# Patient Record
Sex: FEMALE | Race: BLACK OR AFRICAN AMERICAN | Employment: FULL TIME | URBAN - METROPOLITAN AREA
[De-identification: names, ages, dates, MRNs, and addresses within clinical notes are randomized per-mention and may not be internally consistent; named-entity substitution may affect disease eponyms.]

---

## 2017-07-06 ENCOUNTER — HOSPITAL ENCOUNTER (EMERGENCY)
Age: 22
Discharge: HOME | End: 2017-07-06
Attending: EMERGENCY MEDICINE | Admitting: EMERGENCY MEDICINE
Payer: COMMERCIAL

## 2017-07-06 ENCOUNTER — APPOINTMENT (OUTPATIENT)
Dept: CT IMAGING | Age: 22
End: 2017-07-06
Attending: EMERGENCY MEDICINE
Payer: COMMERCIAL

## 2017-07-06 VITALS
WEIGHT: 218 LBS | OXYGEN SATURATION: 98 % | RESPIRATION RATE: 18 BRPM | HEART RATE: 86 BPM | DIASTOLIC BLOOD PRESSURE: 87 MMHG | SYSTOLIC BLOOD PRESSURE: 119 MMHG | TEMPERATURE: 99.2 F | HEIGHT: 61 IN | BODY MASS INDEX: 41.16 KG/M2

## 2017-07-06 DIAGNOSIS — N39.0 URINARY TRACT INFECTION WITHOUT HEMATURIA, SITE UNSPECIFIED: Primary | ICD-10-CM

## 2017-07-06 LAB
ALBUMIN SERPL BCP-MCNC: 3.7 G/DL (ref 3.5–5)
ALBUMIN/GLOB SERPL: 1 {RATIO} (ref 1.1–2.2)
ALP SERPL-CCNC: 62 U/L (ref 45–117)
ALT SERPL-CCNC: 16 U/L (ref 12–78)
ANION GAP BLD CALC-SCNC: 8 MMOL/L (ref 5–15)
APPEARANCE UR: ABNORMAL
AST SERPL W P-5'-P-CCNC: 10 U/L (ref 15–37)
BACTERIA URNS QL MICRO: ABNORMAL /HPF
BASOPHILS # BLD AUTO: 0 K/UL (ref 0–0.1)
BASOPHILS # BLD: 0 % (ref 0–1)
BILIRUB SERPL-MCNC: 0.6 MG/DL (ref 0.2–1)
BILIRUB UR QL: NEGATIVE
BUN SERPL-MCNC: 8 MG/DL (ref 6–20)
BUN/CREAT SERPL: 10 (ref 12–20)
CALCIUM SERPL-MCNC: 8.6 MG/DL (ref 8.5–10.1)
CHLORIDE SERPL-SCNC: 103 MMOL/L (ref 97–108)
CO2 SERPL-SCNC: 27 MMOL/L (ref 21–32)
COLOR UR: ABNORMAL
CREAT SERPL-MCNC: 0.77 MG/DL (ref 0.55–1.02)
EOSINOPHIL # BLD: 0.1 K/UL (ref 0–0.4)
EOSINOPHIL NFR BLD: 2 % (ref 0–7)
EPITH CASTS URNS QL MICRO: ABNORMAL /LPF
ERYTHROCYTE [DISTWIDTH] IN BLOOD BY AUTOMATED COUNT: 15.2 % (ref 11.5–14.5)
GLOBULIN SER CALC-MCNC: 3.7 G/DL (ref 2–4)
GLUCOSE SERPL-MCNC: 79 MG/DL (ref 65–100)
GLUCOSE UR STRIP.AUTO-MCNC: NEGATIVE MG/DL
HCG UR QL: NEGATIVE
HCT VFR BLD AUTO: 37.6 % (ref 35–47)
HGB BLD-MCNC: 12.4 G/DL (ref 11.5–16)
HGB UR QL STRIP: ABNORMAL
HYALINE CASTS URNS QL MICRO: ABNORMAL /LPF (ref 0–5)
KETONES UR QL STRIP.AUTO: NEGATIVE MG/DL
LEUKOCYTE ESTERASE UR QL STRIP.AUTO: ABNORMAL
LYMPHOCYTES # BLD AUTO: 50 % (ref 12–49)
LYMPHOCYTES # BLD: 2 K/UL (ref 0.8–3.5)
MCH RBC QN AUTO: 26.6 PG (ref 26–34)
MCHC RBC AUTO-ENTMCNC: 33 G/DL (ref 30–36.5)
MCV RBC AUTO: 80.7 FL (ref 80–99)
MONOCYTES # BLD: 0.3 K/UL (ref 0–1)
MONOCYTES NFR BLD AUTO: 8 % (ref 5–13)
NEUTS SEG # BLD: 1.6 K/UL (ref 1.8–8)
NEUTS SEG NFR BLD AUTO: 40 % (ref 32–75)
NITRITE UR QL STRIP.AUTO: NEGATIVE
PH UR STRIP: 6 [PH] (ref 5–8)
PLATELET # BLD AUTO: 396 K/UL (ref 150–400)
POTASSIUM SERPL-SCNC: 3.6 MMOL/L (ref 3.5–5.1)
PROT SERPL-MCNC: 7.4 G/DL (ref 6.4–8.2)
PROT UR STRIP-MCNC: NEGATIVE MG/DL
RBC # BLD AUTO: 4.66 M/UL (ref 3.8–5.2)
RBC #/AREA URNS HPF: ABNORMAL /HPF (ref 0–5)
SODIUM SERPL-SCNC: 138 MMOL/L (ref 136–145)
SP GR UR REFRACTOMETRY: 1.02 (ref 1–1.03)
UA: UC IF INDICATED,UAUC: ABNORMAL
UROBILINOGEN UR QL STRIP.AUTO: 0.2 EU/DL (ref 0.2–1)
WBC # BLD AUTO: 4 K/UL (ref 3.6–11)
WBC URNS QL MICRO: ABNORMAL /HPF (ref 0–4)

## 2017-07-06 PROCEDURE — 80053 COMPREHEN METABOLIC PANEL: CPT | Performed by: EMERGENCY MEDICINE

## 2017-07-06 PROCEDURE — 96375 TX/PRO/DX INJ NEW DRUG ADDON: CPT

## 2017-07-06 PROCEDURE — 74176 CT ABD & PELVIS W/O CONTRAST: CPT

## 2017-07-06 PROCEDURE — 74011250636 HC RX REV CODE- 250/636: Performed by: EMERGENCY MEDICINE

## 2017-07-06 PROCEDURE — 99284 EMERGENCY DEPT VISIT MOD MDM: CPT

## 2017-07-06 PROCEDURE — 87086 URINE CULTURE/COLONY COUNT: CPT | Performed by: EMERGENCY MEDICINE

## 2017-07-06 PROCEDURE — 74011000258 HC RX REV CODE- 258: Performed by: EMERGENCY MEDICINE

## 2017-07-06 PROCEDURE — 36415 COLL VENOUS BLD VENIPUNCTURE: CPT | Performed by: EMERGENCY MEDICINE

## 2017-07-06 PROCEDURE — 85025 COMPLETE CBC W/AUTO DIFF WBC: CPT | Performed by: EMERGENCY MEDICINE

## 2017-07-06 PROCEDURE — 96376 TX/PRO/DX INJ SAME DRUG ADON: CPT

## 2017-07-06 PROCEDURE — 96365 THER/PROPH/DIAG IV INF INIT: CPT

## 2017-07-06 PROCEDURE — 81025 URINE PREGNANCY TEST: CPT

## 2017-07-06 PROCEDURE — 81001 URINALYSIS AUTO W/SCOPE: CPT | Performed by: EMERGENCY MEDICINE

## 2017-07-06 RX ORDER — FLUOXETINE HYDROCHLORIDE 20 MG/1
CAPSULE ORAL DAILY
COMMUNITY
End: 2021-06-13

## 2017-07-06 RX ORDER — MORPHINE SULFATE 4 MG/ML
4 INJECTION, SOLUTION INTRAMUSCULAR; INTRAVENOUS ONCE
Status: COMPLETED | OUTPATIENT
Start: 2017-07-06 | End: 2017-07-06

## 2017-07-06 RX ORDER — CLONAZEPAM 1 MG/1
1 TABLET ORAL 2 TIMES DAILY
COMMUNITY
End: 2021-06-13

## 2017-07-06 RX ORDER — MORPHINE SULFATE 4 MG/ML
2 INJECTION, SOLUTION INTRAMUSCULAR; INTRAVENOUS ONCE
Status: COMPLETED | OUTPATIENT
Start: 2017-07-06 | End: 2017-07-06

## 2017-07-06 RX ORDER — ALPRAZOLAM 0.5 MG/1
0.5 TABLET ORAL
COMMUNITY
End: 2021-06-13

## 2017-07-06 RX ORDER — CEPHALEXIN 500 MG/1
500 CAPSULE ORAL 4 TIMES DAILY
Qty: 28 CAP | Refills: 0 | Status: SHIPPED | OUTPATIENT
Start: 2017-07-06 | End: 2017-07-13

## 2017-07-06 RX ADMIN — Medication 2 MG: at 17:31

## 2017-07-06 RX ADMIN — Medication 4 MG: at 15:50

## 2017-07-06 RX ADMIN — CEFTRIAXONE SODIUM 1 G: 1 INJECTION, POWDER, FOR SOLUTION INTRAMUSCULAR; INTRAVENOUS at 17:09

## 2017-07-06 NOTE — ED NOTES
Patient discharged by provider. Patient has no new complaints at this time. Patient leaves ambulatory with steady gait with  who is driving her home.

## 2017-07-06 NOTE — DISCHARGE INSTRUCTIONS
Urinary Tract Infection in Women: Care Instructions  Your Care Instructions    A urinary tract infection, or UTI, is a general term for an infection anywhere between the kidneys and the urethra (where urine comes out). Most UTIs are bladder infections. They often cause pain or burning when you urinate. UTIs are caused by bacteria and can be cured with antibiotics. Be sure to complete your treatment so that the infection goes away. Follow-up care is a key part of your treatment and safety. Be sure to make and go to all appointments, and call your doctor if you are having problems. It's also a good idea to know your test results and keep a list of the medicines you take. How can you care for yourself at home? · Take your antibiotics as directed. Do not stop taking them just because you feel better. You need to take the full course of antibiotics. · Drink extra water and other fluids for the next day or two. This may help wash out the bacteria that are causing the infection. (If you have kidney, heart, or liver disease and have to limit fluids, talk with your doctor before you increase your fluid intake.)  · Avoid drinks that are carbonated or have caffeine. They can irritate the bladder. · Urinate often. Try to empty your bladder each time. · To relieve pain, take a hot bath or lay a heating pad set on low over your lower belly or genital area. Never go to sleep with a heating pad in place. To prevent UTIs  · Drink plenty of water each day. This helps you urinate often, which clears bacteria from your system. (If you have kidney, heart, or liver disease and have to limit fluids, talk with your doctor before you increase your fluid intake.)  · Urinate when you need to. · Urinate right after you have sex. · Change sanitary pads often. · Avoid douches, bubble baths, feminine hygiene sprays, and other feminine hygiene products that have deodorants.   · After going to the bathroom, wipe from front to back.  When should you call for help? Call your doctor now or seek immediate medical care if:  · Symptoms such as fever, chills, nausea, or vomiting get worse or appear for the first time. · You have new pain in your back just below your rib cage. This is called flank pain. · There is new blood or pus in your urine. · You have any problems with your antibiotic medicine. Watch closely for changes in your health, and be sure to contact your doctor if:  · You are not getting better after taking an antibiotic for 2 days. · Your symptoms go away but then come back. Where can you learn more? Go to http://victoria-cristiano.info/. Enter X316 in the search box to learn more about \"Urinary Tract Infection in Women: Care Instructions. \"  Current as of: November 28, 2016  Content Version: 11.3  © 6499-0170 [employer]. Care instructions adapted under license by [employer] (which disclaims liability or warranty for this information). If you have questions about a medical condition or this instruction, always ask your healthcare professional. [address] any warranty or liability for your use of this information. We hope that we have addressed all of your medical concerns. The examination and treatment you received in the Emergency Department were for an emergent problem and were not intended as complete care. It is important that you follow up with your healthcare provider(s) for ongoing care. If your symptoms worsen or do not improve as expected, and you are unable to reach your usual health care provider(s), you should return to the Emergency Department. Today's healthcare is undergoing tremendous change, and patient satisfaction surveys are one of the many tools to assess the quality of medical care. You may receive a survey from the [employer] organization regarding your experience in the Emergency Department.   I hope that your experience has been completely positive, particularly the medical care that I provided. As such, please participate in the survey; anything less than excellent does not meet my expectations or intentions. [address] [facility] and [address] [place] [address] participate in nationally recognized quality of care measures. If your blood pressure is greater than 120/80, as reported below, we urge that you seek medical care to address the potential of high blood pressure, commonly known as hypertension. Hypertension can be hereditary or can be caused by certain medical conditions, pain, stress, or \"white coat syndrome. \"       Please make an appointment with your health care provider(s) for follow up of your Emergency Department visit. VITALS:   Patient Vitals for the past 8 hrs:   Temp Pulse Resp BP SpO2   07/06/17 1630 - - - - 98 %   07/06/17 1503 99.2 °F (37.3 °C) 86 18 126/88 98 %          Thank you for allowing us to provide you with medical care today. We realize that you have many choices for your emergency care needs. Please choose us in the future for any continued health care needs. Jessica Dow MD    [facility].   Office: [phone]            Recent Results (from the past 24 hour(s))   URINALYSIS W/ REFLEX CULTURE    Collection Time: 07/06/17  3:47 PM   Result Value Ref Range    Color YELLOW/STRAW      Appearance CLOUDY (A) CLEAR      Specific gravity 1.022 1.003 - 1.030      pH (UA) 6.0 5.0 - 8.0      Protein NEGATIVE  NEG mg/dL    Glucose NEGATIVE  NEG mg/dL    Ketone NEGATIVE  NEG mg/dL    Bilirubin NEGATIVE  NEG      Blood TRACE (A) NEG      Urobilinogen 0.2 0.2 - 1.0 EU/dL    Nitrites NEGATIVE  NEG      Leukocyte Esterase TRACE (A) NEG      WBC 10-20 0 - 4 /hpf    RBC 5-10 0 - 5 /hpf    Epithelial cells MODERATE (A) FEW /lpf    Bacteria 1+ (A) NEG /hpf    UA:UC IF INDICATED URINE CULTURE ORDERED (A) CNI      Hyaline cast 0-2 0 - 5 /lpf   CBC WITH AUTOMATED DIFF    Collection Time: 07/06/17  3:47 PM   Result Value Ref Range    WBC 4.0 3.6 - 11.0 K/uL    RBC 4.66 3.80 - 5.20 M/uL    HGB 12.4 11.5 - 16.0 g/dL    HCT 37.6 35.0 - 47.0 %    MCV 80.7 80.0 - 99.0 FL    MCH 26.6 26.0 - 34.0 PG    MCHC 33.0 30.0 - 36.5 g/dL    RDW 15.2 (H) 11.5 - 14.5 %    PLATELET 550 564 - 479 K/uL    NEUTROPHILS 40 32 - 75 %    LYMPHOCYTES 50 (H) 12 - 49 %    MONOCYTES 8 5 - 13 %    EOSINOPHILS 2 0 - 7 %    BASOPHILS 0 0 - 1 %    ABS. NEUTROPHILS 1.6 (L) 1.8 - 8.0 K/UL    ABS. LYMPHOCYTES 2.0 0.8 - 3.5 K/UL    ABS. MONOCYTES 0.3 0.0 - 1.0 K/UL    ABS. EOSINOPHILS 0.1 0.0 - 0.4 K/UL    ABS. BASOPHILS 0.0 0.0 - 0.1 K/UL   HCG URINE, QL. - POC    Collection Time: 07/06/17  3:56 PM   Result Value Ref Range    Pregnancy test,urine (POC) NEGATIVE  NEG     METABOLIC PANEL, COMPREHENSIVE    Collection Time: 07/06/17  4:28 PM   Result Value Ref Range    Sodium 138 136 - 145 mmol/L    Potassium 3.6 3.5 - 5.1 mmol/L    Chloride 103 97 - 108 mmol/L    CO2 27 21 - 32 mmol/L    Anion gap 8 5 - 15 mmol/L    Glucose 79 65 - 100 mg/dL    BUN 8 6 - 20 MG/DL    Creatinine 0.77 0.55 - 1.02 MG/DL    BUN/Creatinine ratio 10 (L) 12 - 20      GFR est AA >60 >60 ml/min/1.73m2    GFR est non-AA >60 >60 ml/min/1.73m2    Calcium 8.6 8.5 - 10.1 MG/DL    Bilirubin, total 0.6 0.2 - 1.0 MG/DL    ALT (SGPT) 16 12 - 78 U/L    AST (SGOT) 10 (L) 15 - 37 U/L    Alk. phosphatase 62 45 - 117 U/L    Protein, total 7.4 6.4 - 8.2 g/dL    Albumin 3.7 3.5 - 5.0 g/dL    Globulin 3.7 2.0 - 4.0 g/dL    A-G Ratio 1.0 (L) 1.1 - 2.2         Ct Abd Pelv Wo Cont    Result Date: 7/6/2017  INDICATION: kid stone COMPARISON: February 6, 2015 TECHNIQUE: Noncontrast thin axial images were obtained through the abdomen and pelvis. Coronal and sagittal reconstructions were generated.  CT dose reduction was achieved through use of a standardized protocol tailored for this examination and automatic exposure control for dose modulation. FINDINGS: LUNG BASES: No abnormality. LIVER: No mass or biliary dilatation. GALLBLADDER: Unremarkable. SPLEEN: No enlargement or lesion. PANCREAS: No mass or ductal dilatation. ADRENALS: No mass. KIDNEYS: Several punctate calculi in both kidneys. No hydronephrosis. No ureteral calculus evident. GI TRACT:  No bowel obstruction. Difficult to assess bowel wall thickening given lack of oral contrast material. PERITONEUM: No free air or free fluid. APPENDIX: Unremarkable. Frutoso Golder RETROPERITONEUM: No lymphadenopathy or aortic aneurysm. ADDITIONAL COMMENTS: N/A. URINARY BLADDER: Unremarkable. REPRODUCTIVE ORGANS: Unremarkable. LYMPH NODES:  None enlarged. FREE FLUID:  None. BONES: No destructive bone lesion. ADDITIONAL COMMENTS: N/A. IMPRESSION: Punctate bilateral nephrolithiasis, with no ureteral calculus/hydronephrosis. No acute abnormality. Urinary Tract Infection in Women: Care Instructions  Your Care Instructions    A urinary tract infection, or UTI, is a general term for an infection anywhere between the kidneys and the urethra (where urine comes out). Most UTIs are bladder infections. They often cause pain or burning when you urinate. UTIs are caused by bacteria and can be cured with antibiotics. Be sure to complete your treatment so that the infection goes away. Follow-up care is a key part of your treatment and safety. Be sure to make and go to all appointments, and call your doctor if you are having problems. It's also a good idea to know your test results and keep a list of the medicines you take. How can you care for yourself at home? · Take your antibiotics as directed. Do not stop taking them just because you feel better. You need to take the full course of antibiotics. · Drink extra water and other fluids for the next day or two. This may help wash out the bacteria that are causing the infection.  (If you have kidney, heart, or liver disease and have to limit fluids, talk with your doctor before you increase your fluid intake.)  · Avoid drinks that are carbonated or have caffeine. They can irritate the bladder. · Urinate often. Try to empty your bladder each time. · To relieve pain, take a hot bath or lay a heating pad set on low over your lower belly or genital area. Never go to sleep with a heating pad in place. To prevent UTIs  · Drink plenty of water each day. This helps you urinate often, which clears bacteria from your system. (If you have kidney, heart, or liver disease and have to limit fluids, talk with your doctor before you increase your fluid intake.)  · Urinate when you need to. · Urinate right after you have sex. · Change sanitary pads often. · Avoid douches, bubble baths, feminine hygiene sprays, and other feminine hygiene products that have deodorants. · After going to the bathroom, wipe from front to back. When should you call for help? Call your doctor now or seek immediate medical care if:  · Symptoms such as fever, chills, nausea, or vomiting get worse or appear for the first time. · You have new pain in your back just below your rib cage. This is called flank pain. · There is new blood or pus in your urine. · You have any problems with your antibiotic medicine. Watch closely for changes in your health, and be sure to contact your doctor if:  · You are not getting better after taking an antibiotic for 2 days. · Your symptoms go away but then come back. Where can you learn more? Go to http://victoria-cristiano.info/. Enter Y290 in the search box to learn more about \"Urinary Tract Infection in Women: Care Instructions. \"  Current as of: November 28, 2016  Content Version: 11.3  © 5514-2705 [employer]. Care instructions adapted under license by [employer] (which disclaims liability or warranty for this information).  If you have questions about a medical condition or this instruction, always ask your healthcare professional. [address] any warranty or liability for your use of this information. We hope that we have addressed all of your medical concerns. The examination and treatment you received in the Emergency Department were for an emergent problem and were not intended as complete care. It is important that you follow up with your healthcare provider(s) for ongoing care. If your symptoms worsen or do not improve as expected, and you are unable to reach your usual health care provider(s), you should return to the Emergency Department. Today's healthcare is undergoing tremendous change, and patient satisfaction surveys are one of the many tools to assess the quality of medical care. You may receive a survey from the [employer] regarding your experience in the Emergency Department. I hope that your experience has been completely positive, particularly the medical care that I provided. As such, please participate in the survey; anything less than excellent does not meet my expectations or intentions. [facility] and [address] [place] [address] participate in nationally recognized quality of care measures. If your blood pressure is greater than 120/80, as reported below, we urge that you seek medical care to address the potential of high blood pressure, commonly known as hypertension. Hypertension can be hereditary or can be caused by certain medical conditions, pain, stress, or \"white coat syndrome. \"       Please make an appointment with your health care provider(s) for follow up of your Emergency Department visit. VITALS:   Patient Vitals for the past 8 hrs:   Temp Pulse Resp BP SpO2   07/06/17 1630 - - - - 98 %   07/06/17 1503 99.2 °F (37.3 °C) 86 18 126/88 98 %          Thank you for allowing us to provide you with medical care today. We realize that you have many choices for your emergency care needs.   Please choose us in the future for any continued health care needs. Glendell Coffin [employer], [facility]   Office: [phone]            Recent Results (from the past 24 hour(s))   URINALYSIS W/ REFLEX CULTURE    Collection Time: 07/06/17  3:47 PM   Result Value Ref Range    Color YELLOW/STRAW      Appearance CLOUDY (A) CLEAR      Specific gravity 1.022 1.003 - 1.030      pH (UA) 6.0 5.0 - 8.0      Protein NEGATIVE  NEG mg/dL    Glucose NEGATIVE  NEG mg/dL    Ketone NEGATIVE  NEG mg/dL    Bilirubin NEGATIVE  NEG      Blood TRACE (A) NEG      Urobilinogen 0.2 0.2 - 1.0 EU/dL    Nitrites NEGATIVE  NEG      Leukocyte Esterase TRACE (A) NEG      WBC 10-20 0 - 4 /hpf    RBC 5-10 0 - 5 /hpf    Epithelial cells MODERATE (A) FEW /lpf    Bacteria 1+ (A) NEG /hpf    UA:UC IF INDICATED URINE CULTURE ORDERED (A) CNI      Hyaline cast 0-2 0 - 5 /lpf   CBC WITH AUTOMATED DIFF    Collection Time: 07/06/17  3:47 PM   Result Value Ref Range    WBC 4.0 3.6 - 11.0 K/uL    RBC 4.66 3.80 - 5.20 M/uL    HGB 12.4 11.5 - 16.0 g/dL    HCT 37.6 35.0 - 47.0 %    MCV 80.7 80.0 - 99.0 FL    MCH 26.6 26.0 - 34.0 PG    MCHC 33.0 30.0 - 36.5 g/dL    RDW 15.2 (H) 11.5 - 14.5 %    PLATELET 049 462 - 346 K/uL    NEUTROPHILS 40 32 - 75 %    LYMPHOCYTES 50 (H) 12 - 49 %    MONOCYTES 8 5 - 13 %    EOSINOPHILS 2 0 - 7 %    BASOPHILS 0 0 - 1 %    ABS. NEUTROPHILS 1.6 (L) 1.8 - 8.0 K/UL    ABS. LYMPHOCYTES 2.0 0.8 - 3.5 K/UL    ABS. MONOCYTES 0.3 0.0 - 1.0 K/UL    ABS. EOSINOPHILS 0.1 0.0 - 0.4 K/UL    ABS.  BASOPHILS 0.0 0.0 - 0.1 K/UL   HCG URINE, QL. - POC    Collection Time: 07/06/17  3:56 PM   Result Value Ref Range    Pregnancy test,urine (POC) NEGATIVE  NEG     METABOLIC PANEL, COMPREHENSIVE    Collection Time: 07/06/17  4:28 PM   Result Value Ref Range    Sodium 138 136 - 145 mmol/L    Potassium 3.6 3.5 - 5.1 mmol/L    Chloride 103 97 - 108 mmol/L    CO2 27 21 - 32 mmol/L    Anion gap 8 5 - 15 mmol/L    Glucose 79 65 - 100 mg/dL    BUN 8 6 - 20 MG/DL    Creatinine 0.77 0.55 - 1.02 MG/DL    BUN/Creatinine ratio 10 (L) 12 - 20      GFR est AA >60 >60 ml/min/1.73m2    GFR est non-AA >60 >60 ml/min/1.73m2    Calcium 8.6 8.5 - 10.1 MG/DL    Bilirubin, total 0.6 0.2 - 1.0 MG/DL    ALT (SGPT) 16 12 - 78 U/L    AST (SGOT) 10 (L) 15 - 37 U/L    Alk. phosphatase 62 45 - 117 U/L    Protein, total 7.4 6.4 - 8.2 g/dL    Albumin 3.7 3.5 - 5.0 g/dL    Globulin 3.7 2.0 - 4.0 g/dL    A-G Ratio 1.0 (L) 1.1 - 2.2         Ct Abd Pelv Wo Cont    Result Date: 7/6/2017  INDICATION: kid stone COMPARISON: February 6, 2015 TECHNIQUE: Noncontrast thin axial images were obtained through the abdomen and pelvis. Coronal and sagittal reconstructions were generated. CT dose reduction was achieved through use of a standardized protocol tailored for this examination and automatic exposure control for dose modulation. FINDINGS: LUNG BASES: No abnormality. LIVER: No mass or biliary dilatation. GALLBLADDER: Unremarkable. SPLEEN: No enlargement or lesion. PANCREAS: No mass or ductal dilatation. ADRENALS: No mass. KIDNEYS: Several punctate calculi in both kidneys. No hydronephrosis. No ureteral calculus evident. GI TRACT:  No bowel obstruction. Difficult to assess bowel wall thickening given lack of oral contrast material. PERITONEUM: No free air or free fluid. APPENDIX: Unremarkable. Danelle Belch RETROPERITONEUM: No lymphadenopathy or aortic aneurysm. ADDITIONAL COMMENTS: N/A. URINARY BLADDER: Unremarkable. REPRODUCTIVE ORGANS: Unremarkable. LYMPH NODES:  None enlarged. FREE FLUID:  None. BONES: No destructive bone lesion. ADDITIONAL COMMENTS: N/A. IMPRESSION: Punctate bilateral nephrolithiasis, with no ureteral calculus/hydronephrosis. No acute abnormality.

## 2017-07-06 NOTE — ED PROVIDER NOTES
HPI Comments: 19 y.o. female with past medical history significant for s/p lithotripsy x 5, nephrolithiasis, CKD and kidney stones who presents from home with chief complaint of flank pain. Pt states that since 2230 last night she has been experiencing cramping left sided flank pain with associated intermittent sharp pain. Pt also claims to be experiencing nausea, decreased appetite and a subjective low grade fever as well. Pt says that her sxs feel similar to prior kidney stones she has had in the past. Pt denies having had any recent lithotripsy procedures but claims to have had about 5 lithotripsy procedures in the past. There are no other acute medical concerns at this time. Social hx: (+) tobacco use. [address] [facility]. Note written by Rica Johnson. Otilia Pickard, as dictated by Roseanne Herrera MD 3:25 PM      The history is provided by the patient. No  was used. Past Medical History:   Diagnosis Date    ADHD (attention deficit hyperactivity disorder) 2008    Dr. Germania Kelsey Ankle fracture, left     4x    Asthma attack     Chronic kidney disease     kidney stones    Depression, major 8/2013    Dr. Petty Mood allergy 2010    beef (severe), tomoato (severe), others. Dr. Terrazas Began GERD (gastroesophageal reflux disease)     IUD (intrauterine device) in place 7/2014    Dr. Veronica Walls    Nephrolithiasis     last 8/2012 s/p lithotripsy. calcium    Panic anxiety syndrome 8/2013    Dr. Germania Kelsey Pap smear for cervical cancer screening     Dr Veronica Walls       Past Surgical History:   Procedure Laterality Date    HX TONSIL AND ADENOIDECTOMY      LITHOTRIPSY  3 total         No family history on file. Social History     Social History    Marital status: SINGLE     Spouse name: N/A    Number of children: N/A    Years of education: N/A     Occupational History    Not on file.      Social History Main Topics    Smoking status: Current Every Day Smoker     Types: Cigarettes    Smokeless tobacco: Never Used    Alcohol use No    Drug use: No    Sexual activity: Yes     Other Topics Concern    Not on file     Social History Narrative         ALLERGIES: Hydrocodone; Bactrim [sulfamethoprim ds]; and Griseofulvin    Review of Systems   Constitutional: Positive for appetite change. HENT: Negative. Respiratory: Negative. Cardiovascular: Negative. Gastrointestinal: Positive for nausea. Genitourinary: Positive for flank pain. All other systems reviewed and are negative. Vitals:    07/06/17 1503   BP: 126/88   Pulse: 86   Resp: 18   Temp: 99.2 °F (37.3 °C)   SpO2: 98%   Weight: 98.9 kg (218 lb)   Height: 5' 1\" (1.549 m)            Physical Exam   Constitutional: She is oriented to person, place, and time. She appears well-developed and well-nourished. No distress. HENT:   Head: Normocephalic and atraumatic. Eyes: Conjunctivae are normal. No scleral icterus. Neck: Neck supple. No tracheal deviation present. Cardiovascular: Normal rate, regular rhythm, normal heart sounds and intact distal pulses. Exam reveals no gallop and no friction rub. No murmur heard. Pulmonary/Chest: Effort normal and breath sounds normal. She has no wheezes. She has no rales. Abdominal: Soft. She exhibits no distension. There is no tenderness. There is CVA tenderness. There is no rebound and no guarding. Musculoskeletal: She exhibits no edema. Neurological: She is alert and oriented to person, place, and time. Skin: Skin is warm and dry. No rash noted. Psychiatric: She has a normal mood and affect. Nursing note and vitals reviewed. Note written by Julianna Curtis. Teofilo Hughes, as dictated by Indy Gomes MD 3:25 PM       [facility]  ED Course       Procedures    PROGRESS NOTE:  5:58 PM  Pt's CT shows only punctate calculi in the kidneys, no uretal calculi or blockages. Will treat for UTI and discharge home with close f/u.    5:59 PM  Patient's results have been reviewed with them. Patient and/or family have verbally conveyed their understanding and agreement of the patient's signs, symptoms, diagnosis, treatment and prognosis and additionally agree to follow up as recommended or return to the Emergency Room should their condition change prior to follow-up. Discharge instructions have also been provided to the patient with some educational information regarding their diagnosis as well a list of reasons why they would want to return to the ER prior to their follow-up appointment should their condition change.

## 2017-07-06 NOTE — ED TRIAGE NOTES
Pt c/o sharp left flank pain that started last night. +nausea. H/o kidney stones and states this feels the same.

## 2017-07-08 LAB
BACTERIA SPEC CULT: NORMAL
CC UR VC: NORMAL
SERVICE CMNT-IMP: NORMAL

## 2017-07-27 ENCOUNTER — APPOINTMENT (OUTPATIENT)
Dept: ULTRASOUND IMAGING | Age: 22
End: 2017-07-27
Attending: NURSE PRACTITIONER
Payer: COMMERCIAL

## 2017-07-27 ENCOUNTER — HOSPITAL ENCOUNTER (EMERGENCY)
Age: 22
Discharge: HOME | End: 2017-07-27
Attending: EMERGENCY MEDICINE
Payer: COMMERCIAL

## 2017-07-27 VITALS
HEIGHT: 60 IN | DIASTOLIC BLOOD PRESSURE: 90 MMHG | SYSTOLIC BLOOD PRESSURE: 124 MMHG | HEART RATE: 81 BPM | TEMPERATURE: 98.4 F | BODY MASS INDEX: 42.21 KG/M2 | RESPIRATION RATE: 16 BRPM | OXYGEN SATURATION: 100 % | WEIGHT: 215 LBS

## 2017-07-27 DIAGNOSIS — N23 RENAL COLIC: Primary | ICD-10-CM

## 2017-07-27 LAB
ANION GAP BLD CALC-SCNC: 7 MMOL/L (ref 5–15)
APPEARANCE UR: ABNORMAL
BACTERIA URNS QL MICRO: ABNORMAL /HPF
BASOPHILS # BLD AUTO: 0 K/UL (ref 0–0.1)
BASOPHILS # BLD: 0 % (ref 0–1)
BILIRUB UR QL: NEGATIVE
BUN SERPL-MCNC: 8 MG/DL (ref 6–20)
BUN/CREAT SERPL: 8 (ref 12–20)
CALCIUM SERPL-MCNC: 8.6 MG/DL (ref 8.5–10.1)
CHLORIDE SERPL-SCNC: 105 MMOL/L (ref 97–108)
CO2 SERPL-SCNC: 29 MMOL/L (ref 21–32)
COLOR UR: ABNORMAL
CREAT SERPL-MCNC: 0.97 MG/DL (ref 0.55–1.02)
EOSINOPHIL # BLD: 0.1 K/UL (ref 0–0.4)
EOSINOPHIL NFR BLD: 2 % (ref 0–7)
EPITH CASTS URNS QL MICRO: ABNORMAL /LPF
ERYTHROCYTE [DISTWIDTH] IN BLOOD BY AUTOMATED COUNT: 16.3 % (ref 11.5–14.5)
GLUCOSE SERPL-MCNC: 75 MG/DL (ref 65–100)
GLUCOSE UR STRIP.AUTO-MCNC: NEGATIVE MG/DL
HCG UR QL: NEGATIVE
HCT VFR BLD AUTO: 39.7 % (ref 35–47)
HGB BLD-MCNC: 12.6 G/DL (ref 11.5–16)
HGB UR QL STRIP: ABNORMAL
KETONES UR QL STRIP.AUTO: ABNORMAL MG/DL
LEUKOCYTE ESTERASE UR QL STRIP.AUTO: ABNORMAL
LYMPHOCYTES # BLD AUTO: 39 % (ref 12–49)
LYMPHOCYTES # BLD: 2.5 K/UL (ref 0.8–3.5)
MAGNESIUM SERPL-MCNC: 2 MG/DL (ref 1.6–2.4)
MCH RBC QN AUTO: 26.8 PG (ref 26–34)
MCHC RBC AUTO-ENTMCNC: 31.7 G/DL (ref 30–36.5)
MCV RBC AUTO: 84.5 FL (ref 80–99)
MONOCYTES # BLD: 0.6 K/UL (ref 0–1)
MONOCYTES NFR BLD AUTO: 9 % (ref 5–13)
MUCOUS THREADS URNS QL MICRO: ABNORMAL /LPF
NEUTS SEG # BLD: 3.3 K/UL (ref 1.8–8)
NEUTS SEG NFR BLD AUTO: 50 % (ref 32–75)
NITRITE UR QL STRIP.AUTO: NEGATIVE
PH UR STRIP: 6 [PH] (ref 5–8)
PLATELET # BLD AUTO: 390 K/UL (ref 150–400)
POTASSIUM SERPL-SCNC: 4.1 MMOL/L (ref 3.5–5.1)
PROT UR STRIP-MCNC: 30 MG/DL
RBC # BLD AUTO: 4.7 M/UL (ref 3.8–5.2)
RBC #/AREA URNS HPF: >100 /HPF (ref 0–5)
SODIUM SERPL-SCNC: 141 MMOL/L (ref 136–145)
SP GR UR REFRACTOMETRY: 1.02 (ref 1–1.03)
UROBILINOGEN UR QL STRIP.AUTO: 0.2 EU/DL (ref 0.2–1)
WBC # BLD AUTO: 6.5 K/UL (ref 3.6–11)
WBC URNS QL MICRO: ABNORMAL /HPF (ref 0–4)

## 2017-07-27 PROCEDURE — 74011250636 HC RX REV CODE- 250/636: Performed by: EMERGENCY MEDICINE

## 2017-07-27 PROCEDURE — 99283 EMERGENCY DEPT VISIT LOW MDM: CPT

## 2017-07-27 PROCEDURE — 96361 HYDRATE IV INFUSION ADD-ON: CPT

## 2017-07-27 PROCEDURE — 87086 URINE CULTURE/COLONY COUNT: CPT | Performed by: EMERGENCY MEDICINE

## 2017-07-27 PROCEDURE — 85025 COMPLETE CBC W/AUTO DIFF WBC: CPT | Performed by: NURSE PRACTITIONER

## 2017-07-27 PROCEDURE — 81001 URINALYSIS AUTO W/SCOPE: CPT | Performed by: NURSE PRACTITIONER

## 2017-07-27 PROCEDURE — 83735 ASSAY OF MAGNESIUM: CPT | Performed by: NURSE PRACTITIONER

## 2017-07-27 PROCEDURE — 76770 US EXAM ABDO BACK WALL COMP: CPT

## 2017-07-27 PROCEDURE — 96374 THER/PROPH/DIAG INJ IV PUSH: CPT

## 2017-07-27 PROCEDURE — 96375 TX/PRO/DX INJ NEW DRUG ADDON: CPT

## 2017-07-27 PROCEDURE — 80048 BASIC METABOLIC PNL TOTAL CA: CPT | Performed by: NURSE PRACTITIONER

## 2017-07-27 PROCEDURE — 81025 URINE PREGNANCY TEST: CPT

## 2017-07-27 PROCEDURE — 36415 COLL VENOUS BLD VENIPUNCTURE: CPT | Performed by: NURSE PRACTITIONER

## 2017-07-27 PROCEDURE — 74011250636 HC RX REV CODE- 250/636: Performed by: NURSE PRACTITIONER

## 2017-07-27 RX ORDER — MORPHINE SULFATE 2 MG/ML
4 INJECTION, SOLUTION INTRAMUSCULAR; INTRAVENOUS ONCE
Status: COMPLETED | OUTPATIENT
Start: 2017-07-27 | End: 2017-07-27

## 2017-07-27 RX ORDER — OXYCODONE AND ACETAMINOPHEN 5; 325 MG/1; MG/1
1 TABLET ORAL
Qty: 15 TAB | Refills: 0 | OUTPATIENT
Start: 2017-07-27 | End: 2020-08-16

## 2017-07-27 RX ORDER — ONDANSETRON 4 MG/1
4 TABLET, ORALLY DISINTEGRATING ORAL
Qty: 10 TAB | Refills: 0 | Status: SHIPPED | OUTPATIENT
Start: 2017-07-27 | End: 2018-08-12

## 2017-07-27 RX ORDER — BUTALBITAL, ACETAMINOPHEN AND CAFFEINE 50; 325; 40 MG/1; MG/1; MG/1
1 TABLET ORAL
Status: DISCONTINUED | OUTPATIENT
Start: 2017-07-27 | End: 2017-07-28 | Stop reason: HOSPADM

## 2017-07-27 RX ORDER — KETOROLAC TROMETHAMINE 30 MG/ML
15 INJECTION, SOLUTION INTRAMUSCULAR; INTRAVENOUS
Status: COMPLETED | OUTPATIENT
Start: 2017-07-27 | End: 2017-07-27

## 2017-07-27 RX ORDER — ONDANSETRON 2 MG/ML
4 INJECTION INTRAMUSCULAR; INTRAVENOUS
Status: COMPLETED | OUTPATIENT
Start: 2017-07-27 | End: 2017-07-27

## 2017-07-27 RX ADMIN — KETOROLAC TROMETHAMINE 15 MG: 30 INJECTION, SOLUTION INTRAMUSCULAR at 20:17

## 2017-07-27 RX ADMIN — Medication 4 MG: at 18:32

## 2017-07-27 RX ADMIN — SODIUM CHLORIDE 1000 ML: 900 INJECTION, SOLUTION INTRAVENOUS at 18:32

## 2017-07-27 RX ADMIN — ONDANSETRON 4 MG: 2 INJECTION INTRAMUSCULAR; INTRAVENOUS at 19:25

## 2017-07-27 NOTE — ED PROVIDER NOTES
HPI Comments: 19 y.o. female with past medical history significant for kidney stones, lithotripsy, GERD, CKD, asthma, and depression  who presents from home with chief complaint of left flank pain. The pt c/o left flank pain with nausea, difficulty urinating, urgency, and a feeling of urinary retention that started 2 days ago. The pt reports that she was able to urinate today but it was painful and reduced output. The pt has had multiple kidney stones and lithotripsy attributed to elevated natural calcium levels. The pt denies vomiting, fever and chills. States pain is 10/10 in the left flank with associated nausea. There are no other acute medical concerns at this time. Social hx: current smoker, no EtOH use  PCP: None  Past Medical History:  2008: ADHD (attention deficit hyperactivity disorder)      Comment: Dr. Tammie Patton  No date: Ankle fracture, left      Comment: 4x  No date: Asthma attack  No date: Chronic kidney disease      Comment: kidney stones  8/2013: Depression, major      Comment: Dr. Tammie Patton  2010: Food allergy      Comment: beef (severe), tomoato (severe), others. Dr. Macie Bravo  No date: GERD (gastroesophageal reflux disease)  7/2014: IUD (intrauterine device) in place      Comment: Dr. John Paul Pagan  No date: Nephrolithiasis      Comment: last 8/2012 s/p lithotripsy. calcium  8/2013: Panic anxiety syndrome      Comment: Dr. Tammie Patton  No date: Pap smear for cervical cancer screening      Comment: Dr John Paul Pagan  Past Surgical History:  No date: HX TONSIL AND ADENOIDECTOMY  3 total: LITHOTRIPSY    Note written by Giovanni Plascencia, as dictated by Tristen Taylor NP  6:12 PM        The history is provided by the patient and the spouse. No  was used.         Past Medical History:   Diagnosis Date    ADHD (attention deficit hyperactivity disorder) 2008    Dr. Law Rivera Ankle fracture, left     4x    Asthma attack     Chronic kidney disease     kidney stones    Depression, major 8/2013    Dr. [place] allergy 2010    beef (severe), tomoato (severe), others. Dr. Kamilla Melo GERD (gastroesophageal reflux disease)     IUD (intrauterine device) in place 7/2014    Dr. Lupe Mayer    Nephrolithiasis     last 8/2012 s/p lithotripsy. calcium    Panic anxiety syndrome 8/2013    Dr. Chyna Reyes Pap smear for cervical cancer screening     Dr Lupe Mayer       Past Surgical History:   Procedure Laterality Date    HX TONSIL AND ADENOIDECTOMY      LITHOTRIPSY  3 total         No family history on file. Social History     Social History    Marital status: SINGLE     Spouse name: N/A    Number of children: N/A    Years of education: N/A     Occupational History    Not on file. Social History Main Topics    Smoking status: Current Every Day Smoker     Types: Cigarettes    Smokeless tobacco: Never Used    Alcohol use No    Drug use: No    Sexual activity: Yes     Other Topics Concern    Not on file     Social History Narrative         ALLERGIES: Hydrocodone; Bactrim [sulfamethoprim ds]; and Griseofulvin    Review of Systems   Constitutional: Positive for appetite change (positive nausea noted). Negative for activity change, chills, fatigue and fever. HENT: Negative for congestion, ear discharge, ear pain, sinus pressure, sore throat, trouble swallowing and voice change. Eyes: Negative for photophobia, pain, redness and visual disturbance. Respiratory: Negative for chest tightness, shortness of breath and wheezing. Cardiovascular: Negative for chest pain and palpitations. Gastrointestinal: Positive for nausea. Negative for abdominal distention, abdominal pain and vomiting. Endocrine: Negative. Genitourinary: Positive for decreased urine volume, difficulty urinating, dysuria, frequency and urgency. Negative for menstrual problem and vaginal pain. Musculoskeletal: Negative for back pain, gait problem, joint swelling, neck pain and neck stiffness.    Skin: Negative for color change. Allergic/Immunologic: Negative. Neurological: Negative for dizziness, speech difficulty, weakness and headaches. Hematological: Does not bruise/bleed easily. Psychiatric/Behavioral: Negative for behavioral problems. The patient is not nervous/anxious. All other systems reviewed and are negative. Vitals:    07/27/17 1715   BP: 124/90   Pulse: 81   Resp: 16   Temp: 98.4 °F (36.9 °C)   SpO2: 100%   Weight: 97.5 kg (215 lb)   Height: 5' (1.524 m)            Physical Exam   Constitutional: She is oriented to person, place, and time. She appears well-developed and well-nourished. No distress. HENT:   Head: Normocephalic and atraumatic. Right Ear: External ear normal.   Left Ear: External ear normal.   Nose: Nose normal.   Mouth/Throat: Oropharynx is clear and moist.   Eyes: Conjunctivae and EOM are normal. Pupils are equal, round, and reactive to light. Right eye exhibits no discharge. Left eye exhibits no discharge. Neck: Normal range of motion. Neck supple. No JVD present. No tracheal deviation present. Cardiovascular: Normal rate, regular rhythm, normal heart sounds and intact distal pulses. Exam reveals no gallop. No murmur heard. Pulmonary/Chest: Effort normal and breath sounds normal. No respiratory distress. She has no wheezes. She has no rales. She exhibits no tenderness. Abdominal: Soft. Bowel sounds are normal. She exhibits no distension. There is no tenderness. There is no rebound and no guarding. Genitourinary:   Genitourinary Comments: Turbid urine noted, pain on palpation to left flank   Musculoskeletal: Normal range of motion. She exhibits no edema or tenderness. Neurological: She is alert and oriented to person, place, and time. Skin: Skin is warm and dry. No rash noted. No erythema. No pallor. Psychiatric: She has a normal mood and affect. Her behavior is normal. Judgment and thought content normal.   Nursing note and vitals reviewed.        MDM  Number of Diagnoses or Management Options    ED Course   1725: assessment of patient reveals left flank pain; orders placed. 1830: Morphine given for pain with positive effects, patient states pain decreased. 1913: patient at renal ultrasound currently. 1915: signout given to Dr. Ashley Cabrera who assumed care of this patient.   Procedures

## 2017-07-27 NOTE — Clinical Note
Thank you for allowing us to provide you with medical care today. We realize that you have many choices for your emergency care needs. We thank you for choosing [facility]. Please choose us in the future for any continued health care needs. We hope we addressed all of your medical concerns. We strive to provide excellent quality care in the Emergency Department. Anything less than excellent does not meet our expectations. The exam and treatment you received in the Emergency Department  were for an emergent problem and are not intended as complete care. It is important that you follow up with a doctor, nurse practitioner, or [address] [place] [address] assistant for ongoing care. If your symptoms worsen or you do not improve as expected and you are un able to reach your usual health care provider, you should return to the Emergency Department. We are available 24 hours a day. Take this sheet with you when you go to your follow-up visit. If you have any problem arranging the follow-up visit, co ntact the Emergency Department immediately. Make an appointment your family doctor for follow up of this visit. Return to the ER if you are unable to be seen in a timely manner.

## 2017-07-27 NOTE — ED TRIAGE NOTES
Pt reports left flank pain starting yesterday. Reports nausea. Denies vomiting and diarrhea. Reports trouble urinating.

## 2017-07-28 NOTE — DISCHARGE INSTRUCTIONS
Kidney Stone: Care Instructions  Your Care Instructions    Kidney stones are formed when salts, minerals, and other substances normally found in the urine clump together. They can be as small as grains of sand or, rarely, as large as golf balls. While the stone is traveling through the ureter, which is the tube that carries urine from the kidney to the bladder, you will probably feel pain. The pain may be mild or very severe. You may also have some blood in your urine. As soon as the stone reaches the bladder, any intense pain should go away. If a stone is too large to pass on its own, you may need a medical procedure to help you pass the stone. The doctor has checked you carefully, but problems can develop later. If you notice any problems or new symptoms, get medical treatment right away. Follow-up care is a key part of your treatment and safety. Be sure to make and go to all appointments, and call your doctor if you are having problems. It's also a good idea to know your test results and keep a list of the medicines you take. How can you care for yourself at home? · Drink plenty of fluids, enough so that your urine is light yellow or clear like water. If you have kidney, heart, or liver disease and have to limit fluids, talk with your doctor before you increase the amount of fluids you drink. · Take pain medicines exactly as directed. Call your doctor if you think you are having a problem with your medicine. ¨ If the doctor gave you a prescription medicine for pain, take it as prescribed. ¨ If you are not taking a prescription pain medicine, ask your doctor if you can take an over-the-counter medicine. Read and follow all instructions on the label. · Your doctor may ask you to strain your urine so that you can collect your kidney stone when it passes. You can use a kitchen strainer or a tea strainer to catch the stone. Store it in a plastic bag until you see your doctor again.   Preventing future kidney stones  Some changes in your diet may help prevent kidney stones. Depending on the cause of your stones, your doctor may recommend that you:  · Drink plenty of fluids, enough so that your urine is light yellow or clear like water. If you have kidney, heart, or liver disease and have to limit fluids, talk with your doctor before you increase the amount of fluids you drink. · Limit coffee, tea, and alcohol. Also avoid grapefruit juice. · Do not take more than the recommended daily dose of vitamins C and D.  · Avoid antacids such as Gaviscon, Maalox, Mylanta, or Tums. · Limit the amount of salt (sodium) in your diet. · Eat a balanced diet that is not too high in protein. · Limit foods that are high in a substance called oxalate, which can cause kidney stones. These foods include dark green vegetables, rhubarb, chocolate, wheat bran, nuts, cranberries, and beans. When should you call for help? Call your doctor now or seek immediate medical care if:  · You cannot keep down fluids. · Your pain gets worse. · You have a fever or chills. · You have new or worse pain in your back just below your rib cage (the flank area). · You have new or more blood in your urine. Watch closely for changes in your health, and be sure to contact your doctor if:  · You do not get better as expected. Where can you learn more? Go to http://victoria-cristiano.info/. Enter V009 in the search box to learn more about \"Kidney Stone: Care Instructions. \"  Current as of: April 3, 2017  Content Version: 11.3  © 3230-3839 [employer]. Care instructions adapted under license by [employer] (which disclaims liability or warranty for this information). If you have questions about a medical condition or this instruction, always ask your healthcare professional. [address] any warranty or liability for your use of this information.            We hope that we have addressed all of your medical concerns. The examination and treatment you received in the Emergency Department were for an emergent problem and were not intended as complete care. It is important that you follow up with your healthcare provider(s) for ongoing care. If your symptoms worsen or do not improve as expected, and you are unable to reach your usual health care provider(s), you should return to the Emergency Department. Today's healthcare is undergoing tremendous change, and patient satisfaction surveys are one of the many tools to assess the quality of medical care. You may receive a survey from the [employer] organization regarding your experience in the Emergency Department. I hope that your experience has been completely positive, particularly the medical care that I provided. As such, please participate in the survey; anything less than excellent does not meet my expectations or intentions. [facility] and [address] [place] [address] participate in nationally recognized quality of care measures. If your blood pressure is greater than 120/80, as reported below, we urge that you seek medical care to address the potential of high blood pressure, commonly known as hypertension. Hypertension can be hereditary or can be caused by certain medical conditions, pain, stress, or \"white coat syndrome. \"       Please make an appointment with your health care provider(s) for follow up of your Emergency Department visit. VITALS:   Patient Vitals for the past 8 hrs:   Temp Pulse Resp BP SpO2   07/27/17 1715 98.4 °F (36.9 °C) 81 16 124/90 100 %          Thank you for allowing us to provide you with medical care today. We realize that you have many choices for your emergency care needs. Please choose us in the future for any continued health care needs. Monserrat Herr, [address].   Office: [phone]            Recent Results (from the past 24 hour(s))   URINALYSIS W/MICROSCOPIC    Collection Time: 07/27/17  6:36 PM   Result Value Ref Range    Color MARKUS      Appearance TURBID (A) CLEAR      Specific gravity 1.023 1.003 - 1.030      pH (UA) 6.0 5.0 - 8.0      Protein 30 (A) NEG mg/dL    Glucose NEGATIVE  NEG mg/dL    Ketone TRACE (A) NEG mg/dL    Bilirubin NEGATIVE  NEG      Blood LARGE (A) NEG      Urobilinogen 0.2 0.2 - 1.0 EU/dL    Nitrites NEGATIVE  NEG      Leukocyte Esterase LARGE (A) NEG      WBC 5-10 0 - 4 /hpf    RBC >100 (H) 0 - 5 /hpf    Epithelial cells MANY (A) FEW /lpf    Bacteria 3+ (A) NEG /hpf    Mucus 2+ (A) NEG /lpf   CBC WITH AUTOMATED DIFF    Collection Time: 07/27/17  6:36 PM   Result Value Ref Range    WBC 6.5 3.6 - 11.0 K/uL    RBC 4.70 3.80 - 5.20 M/uL    HGB 12.6 11.5 - 16.0 g/dL    HCT 39.7 35.0 - 47.0 %    MCV 84.5 80.0 - 99.0 FL    MCH 26.8 26.0 - 34.0 PG    MCHC 31.7 30.0 - 36.5 g/dL    RDW 16.3 (H) 11.5 - 14.5 %    PLATELET 188 448 - 772 K/uL    NEUTROPHILS 50 32 - 75 %    LYMPHOCYTES 39 12 - 49 %    MONOCYTES 9 5 - 13 %    EOSINOPHILS 2 0 - 7 %    BASOPHILS 0 0 - 1 %    ABS. NEUTROPHILS 3.3 1.8 - 8.0 K/UL    ABS. LYMPHOCYTES 2.5 0.8 - 3.5 K/UL    ABS. MONOCYTES 0.6 0.0 - 1.0 K/UL    ABS. EOSINOPHILS 0.1 0.0 - 0.4 K/UL    ABS.  BASOPHILS 0.0 0.0 - 0.1 K/UL   METABOLIC PANEL, BASIC    Collection Time: 07/27/17  6:36 PM   Result Value Ref Range    Sodium 141 136 - 145 mmol/L    Potassium 4.1 3.5 - 5.1 mmol/L    Chloride 105 97 - 108 mmol/L    CO2 29 21 - 32 mmol/L    Anion gap 7 5 - 15 mmol/L    Glucose 75 65 - 100 mg/dL    BUN 8 6 - 20 MG/DL    Creatinine 0.97 0.55 - 1.02 MG/DL    BUN/Creatinine ratio 8 (L) 12 - 20      GFR est AA >60 >60 ml/min/1.73m2    GFR est non-AA >60 >60 ml/min/1.73m2    Calcium 8.6 8.5 - 10.1 MG/DL   MAGNESIUM    Collection Time: 07/27/17  6:36 PM   Result Value Ref Range    Magnesium 2.0 1.6 - 2.4 mg/dL   HCG URINE, QL. - POC    Collection Time: 07/27/17  6:39 PM   Result Value Ref Range Pregnancy test,urine (POC) NEGATIVE  NEG         Us Retroperitoneum Comp    Result Date: 7/27/2017  History: Possible stone, history of left flank pain for 2 days. Ultrasonography of the retroperitoneum demonstrates that the inferior vena cava appears normal. The aorta is normal in caliber. The bifurcation appears normal. The bladder is partially filled. The right kidney measures 10.4 cm. The left kidney measures 10.7 cm. The kidneys are normal in echotexture. There is no hydronephrosis or renal mass. There is bilateral nephrolithiasis. There is no hydronephrosis. IMPRESSION: Bilateral nephrolithiasis. No hydronephrosis.

## 2017-07-29 LAB
BACTERIA SPEC CULT: NORMAL
CC UR VC: NORMAL
SERVICE CMNT-IMP: NORMAL

## 2018-01-10 ENCOUNTER — HOSPITAL ENCOUNTER (EMERGENCY)
Age: 23
Discharge: HOME | End: 2018-01-10
Attending: EMERGENCY MEDICINE | Admitting: EMERGENCY MEDICINE
Payer: COMMERCIAL

## 2018-01-10 ENCOUNTER — APPOINTMENT (OUTPATIENT)
Dept: GENERAL RADIOLOGY | Age: 23
End: 2018-01-10
Attending: EMERGENCY MEDICINE
Payer: COMMERCIAL

## 2018-01-10 VITALS
HEIGHT: 60 IN | RESPIRATION RATE: 14 BRPM | WEIGHT: 214 LBS | TEMPERATURE: 97.9 F | HEART RATE: 102 BPM | SYSTOLIC BLOOD PRESSURE: 118 MMHG | DIASTOLIC BLOOD PRESSURE: 91 MMHG | BODY MASS INDEX: 42.01 KG/M2 | OXYGEN SATURATION: 97 %

## 2018-01-10 DIAGNOSIS — J45.21 MILD INTERMITTENT ASTHMA WITH ACUTE EXACERBATION: Primary | ICD-10-CM

## 2018-01-10 LAB
ALBUMIN SERPL-MCNC: 3.9 G/DL (ref 3.5–5)
ALBUMIN/GLOB SERPL: 1 {RATIO} (ref 1.1–2.2)
ALP SERPL-CCNC: 61 U/L (ref 45–117)
ALT SERPL-CCNC: 14 U/L (ref 12–78)
ANION GAP SERPL CALC-SCNC: 8 MMOL/L (ref 5–15)
AST SERPL-CCNC: 14 U/L (ref 15–37)
ATRIAL RATE: 96 BPM
BASOPHILS # BLD: 0 K/UL (ref 0–0.1)
BASOPHILS NFR BLD: 0 % (ref 0–1)
BILIRUB SERPL-MCNC: 0.3 MG/DL (ref 0.2–1)
BUN SERPL-MCNC: 9 MG/DL (ref 6–20)
BUN/CREAT SERPL: 10 (ref 12–20)
CALCIUM SERPL-MCNC: 9.2 MG/DL (ref 8.5–10.1)
CALCULATED P AXIS, ECG09: 51 DEGREES
CALCULATED R AXIS, ECG10: 27 DEGREES
CALCULATED T AXIS, ECG11: 15 DEGREES
CHLORIDE SERPL-SCNC: 102 MMOL/L (ref 97–108)
CO2 SERPL-SCNC: 28 MMOL/L (ref 21–32)
CREAT SERPL-MCNC: 0.91 MG/DL (ref 0.55–1.02)
D DIMER PPP FEU-MCNC: 0.23 MG/L FEU (ref 0–0.65)
DIAGNOSIS, 93000: NORMAL
EOSINOPHIL # BLD: 0.1 K/UL (ref 0–0.4)
EOSINOPHIL NFR BLD: 2 % (ref 0–7)
ERYTHROCYTE [DISTWIDTH] IN BLOOD BY AUTOMATED COUNT: 14.9 % (ref 11.5–14.5)
GLOBULIN SER CALC-MCNC: 4 G/DL (ref 2–4)
GLUCOSE SERPL-MCNC: 124 MG/DL (ref 65–100)
HCG UR QL: NEGATIVE
HCT VFR BLD AUTO: 37.1 % (ref 35–47)
HGB BLD-MCNC: 12.4 G/DL (ref 11.5–16)
LYMPHOCYTES # BLD: 2.8 K/UL (ref 0.8–3.5)
LYMPHOCYTES NFR BLD: 48 % (ref 12–49)
MCH RBC QN AUTO: 27.3 PG (ref 26–34)
MCHC RBC AUTO-ENTMCNC: 33.4 G/DL (ref 30–36.5)
MCV RBC AUTO: 81.7 FL (ref 80–99)
MONOCYTES # BLD: 0.4 K/UL (ref 0–1)
MONOCYTES NFR BLD: 7 % (ref 5–13)
NEUTS SEG # BLD: 2.5 K/UL (ref 1.8–8)
NEUTS SEG NFR BLD: 43 % (ref 32–75)
P-R INTERVAL, ECG05: 146 MS
PLATELET # BLD AUTO: 371 K/UL (ref 150–400)
POTASSIUM SERPL-SCNC: 3.8 MMOL/L (ref 3.5–5.1)
PROT SERPL-MCNC: 7.9 G/DL (ref 6.4–8.2)
Q-T INTERVAL, ECG07: 346 MS
QRS DURATION, ECG06: 76 MS
QTC CALCULATION (BEZET), ECG08: 437 MS
RBC # BLD AUTO: 4.54 M/UL (ref 3.8–5.2)
SODIUM SERPL-SCNC: 138 MMOL/L (ref 136–145)
TROPONIN I SERPL-MCNC: <0.04 NG/ML
VENTRICULAR RATE, ECG03: 96 BPM
WBC # BLD AUTO: 5.8 K/UL (ref 3.6–11)

## 2018-01-10 PROCEDURE — 74011250636 HC RX REV CODE- 250/636: Performed by: EMERGENCY MEDICINE

## 2018-01-10 PROCEDURE — 77030029684 HC NEB SM VOL KT MONA -A

## 2018-01-10 PROCEDURE — 74011250637 HC RX REV CODE- 250/637: Performed by: EMERGENCY MEDICINE

## 2018-01-10 PROCEDURE — 36415 COLL VENOUS BLD VENIPUNCTURE: CPT | Performed by: EMERGENCY MEDICINE

## 2018-01-10 PROCEDURE — 85025 COMPLETE CBC W/AUTO DIFF WBC: CPT | Performed by: EMERGENCY MEDICINE

## 2018-01-10 PROCEDURE — 93005 ELECTROCARDIOGRAM TRACING: CPT

## 2018-01-10 PROCEDURE — 84484 ASSAY OF TROPONIN QUANT: CPT | Performed by: EMERGENCY MEDICINE

## 2018-01-10 PROCEDURE — 81025 URINE PREGNANCY TEST: CPT

## 2018-01-10 PROCEDURE — 74011000250 HC RX REV CODE- 250: Performed by: EMERGENCY MEDICINE

## 2018-01-10 PROCEDURE — 85379 FIBRIN DEGRADATION QUANT: CPT | Performed by: EMERGENCY MEDICINE

## 2018-01-10 PROCEDURE — 80053 COMPREHEN METABOLIC PANEL: CPT | Performed by: EMERGENCY MEDICINE

## 2018-01-10 PROCEDURE — C9113 INJ PANTOPRAZOLE SODIUM, VIA: HCPCS | Performed by: EMERGENCY MEDICINE

## 2018-01-10 PROCEDURE — 94640 AIRWAY INHALATION TREATMENT: CPT

## 2018-01-10 PROCEDURE — 74011636637 HC RX REV CODE- 636/637: Performed by: EMERGENCY MEDICINE

## 2018-01-10 PROCEDURE — 71046 X-RAY EXAM CHEST 2 VIEWS: CPT

## 2018-01-10 PROCEDURE — 99284 EMERGENCY DEPT VISIT MOD MDM: CPT

## 2018-01-10 RX ORDER — PANTOPRAZOLE SODIUM 40 MG/10ML
40 INJECTION, POWDER, LYOPHILIZED, FOR SOLUTION INTRAVENOUS
Status: COMPLETED | OUTPATIENT
Start: 2018-01-10 | End: 2018-01-10

## 2018-01-10 RX ORDER — PREDNISONE 20 MG/1
60 TABLET ORAL ONCE
Status: COMPLETED | OUTPATIENT
Start: 2018-01-10 | End: 2018-01-10

## 2018-01-10 RX ORDER — ALBUTEROL SULFATE 90 UG/1
2 AEROSOL, METERED RESPIRATORY (INHALATION)
Qty: 1 INHALER | Refills: 0 | Status: SHIPPED | OUTPATIENT
Start: 2018-01-10

## 2018-01-10 RX ORDER — PREDNISONE 20 MG/1
60 TABLET ORAL DAILY
Qty: 12 TAB | Refills: 0 | Status: SHIPPED | OUTPATIENT
Start: 2018-01-10 | End: 2018-01-14

## 2018-01-10 RX ADMIN — PANTOPRAZOLE SODIUM 40 MG: 40 INJECTION, POWDER, FOR SOLUTION INTRAVENOUS at 03:35

## 2018-01-10 RX ADMIN — PREDNISONE 60 MG: 20 TABLET ORAL at 04:24

## 2018-01-10 RX ADMIN — LIDOCAINE HYDROCHLORIDE 40 ML: 20 SOLUTION ORAL; TOPICAL at 03:35

## 2018-01-10 RX ADMIN — ALBUTEROL SULFATE 1 DOSE: 2.5 SOLUTION RESPIRATORY (INHALATION) at 02:11

## 2018-01-10 NOTE — DISCHARGE INSTRUCTIONS
Learning About Asthma  What is asthma? Asthma is a long-term condition that affects your breathing. It causes the airways that lead to the lungs to swell. People with asthma may have asthma attacks. During an asthma attack, the airways tighten and become narrower. This makes it hard to breathe, and you may wheeze or cough. If you have a bad asthma attack, you may need emergency care. Asthma affects people in different ways. Some people only have asthma attacks during allergy season, or when they breathe in cold air, or when they exercise. Others have many bad attacks that send them to the doctor often. What are the symptoms? Symptoms of asthma can be mild or severe. You may have mild attacks now and then, you may have severe symptoms every day, or you may have something in between. How often you have symptoms can also change. When you have asthma, you may:  · Wheeze, making a loud or soft whistling noise when you breathe in and out. · Cough a lot. · Feel tightness in your chest.  · Feel short of breath. · Have trouble sleeping because of coughing or having a hard time breathing. · Get tired quickly during exercise. Your symptoms may be worse at night. How can you prevent asthma attacks? Certain things can make asthma symptoms worse. These are called triggers. When you are around a trigger, an asthma attack is more likely. Common triggers include:  · Cigarette smoke or air pollution. · Things you are allergic to, such as:  ¨ Pollen, mold, or dust mites. ¨ Pet hair, skin, or saliva. · Illnesses, like colds, flu, or pneumonia. · Exercise. · Dry, cold air. Here are some ways to avoid a few common triggers:  · Do not smoke or allow others to smoke around you. If you need help quitting, talk to your doctor about stop-smoking programs and medicines. These can increase your chances of quitting for good.   · If there is a lot of pollution, pollen, or dust outside, stay at home and keep your windows closed. Use an air conditioner or air filter in your home. Check your local weather report or newspaper for air quality and pollen reports. · Get the flu vaccine every year. Talk to your doctor about getting a pneumococcal shot. Wash your hands often to prevent infections. · Avoid exercising outdoors in cold weather. If you are outdoors in cold weather, wear a scarf around your face and breathe through your nose. How is asthma treated? There are two parts to treating asthma, which are outlined in your asthma action plan. The goals are to:  · Control asthma over the long term. The asthma action plan tells you which medicine you may need to take every day. This is called a controller medicine. It helps to reduce the swelling of the airways and prevent asthma attacks. · Treat asthma attacks when they occur. The asthma action plan tells you what to do when you have an asthma attack. It helps you identify triggers that can cause your attacks. You use quick-relief medicine during an attack. The asthma plan also helps you track your symptoms and know how well the treatment is working. Follow-up care is a key part of your treatment and safety. Be sure to make and go to all appointments, and call your doctor if you are having problems. It's also a good idea to know your test results and keep a list of the medicines you take. Where can you learn more? Go to http://victoria-cristiano.info/. Enter [id number] in the search box to learn more about \"Learning About Asthma. \"  Current as of: May 12, 2017  Content Version: 11.4  © 1538-4739 [employer]. Care instructions adapted under license by [employer] (which disclaims liability or warranty for this information). If you have questions about a medical condition or this instruction, always ask your healthcare professional. [address] any warranty or liability for your use of this information.          We hope that we have addressed all of your medical concerns. The examination and treatment you received in the Emergency Department were for an emergent problem and were not intended as complete care. It is important that you follow up with your healthcare provider(s) for ongoing care. If your symptoms worsen or do not improve as expected, and you are unable to reach your usual health care provider(s), you should return to the Emergency Department. Today's healthcare is undergoing tremendous change, and patient satisfaction surveys are one of the many tools to assess the quality of medical care. You may receive a survey from the [employer] regarding your experience in the Emergency Department. I hope that your experience has been completely positive, particularly the medical care that I provided. As such, please participate in the survey; anything less than excellent does not meet my expectations or intentions. [facility] and [facility] participate in nationally recognized quality of care measures. If your blood pressure is greater than 120/80, as reported below, we urge that you seek medical care to address the potential of high blood pressure, commonly known as hypertension. Hypertension can be hereditary or can be caused by certain medical conditions, pain, stress, or \"white coat syndrome. \"       Please make an appointment with your health care provider(s) for follow up of your Emergency Department visit. VITALS:   Patient Vitals for the past 8 hrs:   Temp Pulse Resp BP SpO2   01/10/18 0402 - - - - 98 %   01/10/18 0400 - - - 122/87 98 %   01/10/18 0336 - (!) 102 14 (!) 133/93 100 %   01/10/18 0050 97.9 °F (36.6 °C) 97 16 (!) 139/100 100 %          Thank you for allowing us to provide you with medical care today. We realize that you have many choices for your emergency care needs. Please choose us in the future for any continued health care needs. Ellis Myers MD    [address] [facility].   Office: [phone]            Recent Results (from the past 24 hour(s))   CBC WITH AUTOMATED DIFF    Collection Time: 01/10/18  1:13 AM   Result Value Ref Range    WBC 5.8 3.6 - 11.0 K/uL    RBC 4.54 3.80 - 5.20 M/uL    HGB 12.4 11.5 - 16.0 g/dL    HCT 37.1 35.0 - 47.0 %    MCV 81.7 80.0 - 99.0 FL    MCH 27.3 26.0 - 34.0 PG    MCHC 33.4 30.0 - 36.5 g/dL    RDW 14.9 (H) 11.5 - 14.5 %    PLATELET 322 703 - 275 K/uL    NEUTROPHILS 43 32 - 75 %    LYMPHOCYTES 48 12 - 49 %    MONOCYTES 7 5 - 13 %    EOSINOPHILS 2 0 - 7 %    BASOPHILS 0 0 - 1 %    ABS. NEUTROPHILS 2.5 1.8 - 8.0 K/UL    ABS. LYMPHOCYTES 2.8 0.8 - 3.5 K/UL    ABS. MONOCYTES 0.4 0.0 - 1.0 K/UL    ABS. EOSINOPHILS 0.1 0.0 - 0.4 K/UL    ABS. BASOPHILS 0.0 0.0 - 0.1 K/UL   METABOLIC PANEL, COMPREHENSIVE    Collection Time: 01/10/18  1:13 AM   Result Value Ref Range    Sodium 138 136 - 145 mmol/L    Potassium 3.8 3.5 - 5.1 mmol/L    Chloride 102 97 - 108 mmol/L    CO2 28 21 - 32 mmol/L    Anion gap 8 5 - 15 mmol/L    Glucose 124 (H) 65 - 100 mg/dL    BUN 9 6 - 20 MG/DL    Creatinine 0.91 0.55 - 1.02 MG/DL    BUN/Creatinine ratio 10 (L) 12 - 20      GFR est AA >60 >60 ml/min/1.73m2    GFR est non-AA >60 >60 ml/min/1.73m2    Calcium 9.2 8.5 - 10.1 MG/DL    Bilirubin, total 0.3 0.2 - 1.0 MG/DL    ALT (SGPT) 14 12 - 78 U/L    AST (SGOT) 14 (L) 15 - 37 U/L    Alk.  phosphatase 61 45 - 117 U/L    Protein, total 7.9 6.4 - 8.2 g/dL    Albumin 3.9 3.5 - 5.0 g/dL    Globulin 4.0 2.0 - 4.0 g/dL    A-G Ratio 1.0 (L) 1.1 - 2.2     TROPONIN I    Collection Time: 01/10/18  1:13 AM   Result Value Ref Range    Troponin-I, Qt. <0.04 <0.05 ng/mL   D DIMER    Collection Time: 01/10/18  1:13 AM   Result Value Ref Range    D-dimer 0.23 0.00 - 0.65 mg/L FEU   HCG URINE, QL. - POC    Collection Time: 01/10/18  2:15 AM   Result Value Ref Range    Pregnancy test,urine (POC) NEGATIVE  NEG         Xr Chest Pa Lat    Result Date: 1/10/2018  CLINICAL HISTORY: Chest pain, SOB INDICATION: Chest pain, SOB COMPARISON: 11/21/2016 FINDINGS: PA and lateral views of the chest are obtained. The cardiopericardial silhouette is within normal limits. There is no pleural effusion, pneumothorax or focal consolidation present. IMPRESSION: No acute intrathoracic disease.

## 2018-01-10 NOTE — ED PROVIDER NOTES
HPI Comments: 23 y.o. female with past medical history significant for kidney stone, depression, panic anxiety syndrome, IUD, GERD, and asthma who presents accompanied with chief complaint of SOB. The pt c/o SOB that has been ongoing since she drove to work in the morning approximately 16 hours ago. The pt explains that it feels like \"the wind got knocked out\" of her and she is \"fighting to get a breath. \" The pt notes associated CP that feels like a \"pressure with every breath. \" The pt also notes nausea and dizziness with her SOB and pressure. The pt says that she originally wondered if her symptoms were from not eating breakfast, but they did not improve after eating. The pt explains that she presented to the clinic at her work and they attributed her symptoms to her known acid reflux and anxiety. The pt says that she took 1 mg of her Xanax when she got home and tried to sleep. The pt notes that her SOB and pressure persisted after she woke up from her nap and they have continued to worsen since then. The pt notes that her pressure, SOB, and nausea are severe now. The pt reports taking her Zantac BID today and 2 TUMS without relief. The pt admits that she is getting over a cold and explains that she saw her doctor on Friday to treat her \"asthma-flu\" with a low grade fever, even though she was flu negative. Denies leg swelling, leg pain, taking birth control, hormone therapy, family hx of heart issues, family hx of blood clots, hx of HTN, hx of DM, hx of hypercholesterolemia, and recent travel. There are no other acute medical concerns at this time. Social hx: current smoker - smoked today    Note written by Giovanni Villa, as dictated by Marco Pearce MD 1:45 AM     The history is provided by the patient.         Past Medical History:   Diagnosis Date    ADHD (attention deficit hyperactivity disorder) 2008    Dr. Lissy Wilcox Ankle fracture, left     4x    Asthma attack     Chronic kidney disease     kidney stones    Depression, major 8/2013    Dr. Loera Eis allergy 2010    beef (severe), tomoato (severe), others. Dr. Shirin Ndiaye GERD (gastroesophageal reflux disease)     IUD (intrauterine device) in place 7/2014    Dr. Mckinnon Adjutant    Nephrolithiasis     last 8/2012 s/p lithotripsy. calcium    Panic anxiety syndrome 8/2013    Dr. Isabel Kay Pap smear for cervical cancer screening     Dr Mckinnon Adjutant       Past Surgical History:   Procedure Laterality Date    HX TONSIL AND ADENOIDECTOMY      LITHOTRIPSY  3 total         No family history on file. Social History     Social History    Marital status:      Spouse name: N/A    Number of children: N/A    Years of education: N/A     Occupational History    Not on file. Social History Main Topics    Smoking status: Current Every Day Smoker     Types: Cigarettes    Smokeless tobacco: Never Used    Alcohol use No    Drug use: No    Sexual activity: Yes     Other Topics Concern    Not on file     Social History Narrative         ALLERGIES: Hydrocodone; Bactrim [sulfamethoprim ds]; and Griseofulvin    Review of Systems   Constitutional: Positive for fever (low grade). Respiratory: Positive for shortness of breath. Cardiovascular: Positive for chest pain (pressure). Negative for leg swelling. Gastrointestinal: Positive for nausea. Musculoskeletal: Negative for myalgias. Neurological: Positive for dizziness. All other systems reviewed and are negative. Vitals:    01/10/18 0050   BP: (!) 139/100   Pulse: 97   Resp: 16   Temp: 97.9 °F (36.6 °C)   SpO2: 100%   Weight: 97.1 kg (214 lb)   Height: 5' (1.524 m)            Physical Exam   Constitutional: She is oriented to person, place, and time. She appears well-developed and well-nourished. No distress. HENT:   Head: Normocephalic and atraumatic. Nose: Nose normal.   Mouth/Throat: Oropharynx is clear and moist. No oropharyngeal exudate.    Eyes: Conjunctivae and EOM are normal. Pupils are equal, round, and reactive to light. Right eye exhibits no discharge. Left eye exhibits no discharge. No scleral icterus. Neck: Normal range of motion. Neck supple. No JVD present. Cardiovascular: Normal rate, regular rhythm, normal heart sounds and intact distal pulses. Exam reveals no gallop and no friction rub. No murmur heard. Pulmonary/Chest: Effort normal and breath sounds normal. No stridor. No respiratory distress. She has no wheezes. She has no rales. She exhibits no tenderness. Slightly prolonged expiratory phase. Abdominal: Soft. Bowel sounds are normal. She exhibits no distension and no mass. There is no tenderness. There is no rebound and no guarding. Musculoskeletal: Normal range of motion. She exhibits no edema or tenderness. Neurological: She is alert and oriented to person, place, and time. She has normal reflexes. No cranial nerve deficit. She exhibits normal muscle tone. Coordination normal.   Skin: Skin is warm and dry. No rash noted. No erythema. Psychiatric: She has a normal mood and affect. Her behavior is normal. Judgment and thought content normal.   Nursing note and vitals reviewed. Note written by Giovanni Son, as dictated by Denise Fragoso MD 1:45 AM     [facility]  ED Course       Procedures      PROGRESS NOTE:  3:36 AM Spoke with the pt. Labs and vital signs are largely normal. She states that she is not feeling any better and she still has difficulty breathing. SpO2 100% RA, RR 16    Labs/ddimer/ekg/cxr/vitals all normal.  [facility] and feels that would help. Given history of asthma, will provide inhaler with spacer and short course of prednisone. Follow up with pcp if no improvement in symptoms, return if symptoms worsen.

## 2018-01-10 NOTE — ED NOTES
ED EKG interpretation:  Rhythm: normal sinus rhythm; and regular . Rate (approx.): 96; Axis: normal; P wave: normal; QRS interval: normal ; ST/T wave: non-specific changes; This EKG was interpreted by Luís Bello MD,ED Provider.

## 2018-01-29 NOTE — ED TRIAGE NOTES
Pt c/o all day trouble breathing, chest pain and dizzy and nausea\". Pt went to a doctor today and took zantac today and xanax and klonopin. Pt able to speak in full clear sentences in triage.
parent received counseling on the following healthy behaviors: Nutrition   Patient and/or parent given educational materials - see patient instructions  Was a self-tracking handout given in paper form or via [employer]? No  Discussed use, benefit, and side effects of prescribed medications. Barriers to medication compliance addressed. Treatment plan discussed at visit. Continue routine health care follow up. All patient and/or parent questions answered and voiced understanding.      Requested Prescriptions     Signed Prescriptions Disp Refills    oseltamivir 6mg/ml (TAMIFLU) 6 MG/ML SUSR suspension 1 Bottle 0     Sig: Take 7.5 mLs by mouth 2 times daily for 5 days

## 2018-08-12 ENCOUNTER — HOSPITAL ENCOUNTER (EMERGENCY)
Age: 23
Discharge: HOME | End: 2018-08-12
Attending: EMERGENCY MEDICINE
Payer: COMMERCIAL

## 2018-08-12 VITALS
HEART RATE: 92 BPM | SYSTOLIC BLOOD PRESSURE: 132 MMHG | OXYGEN SATURATION: 100 % | RESPIRATION RATE: 18 BRPM | BODY MASS INDEX: 42.21 KG/M2 | HEIGHT: 60 IN | WEIGHT: 215 LBS | DIASTOLIC BLOOD PRESSURE: 87 MMHG | TEMPERATURE: 99.3 F

## 2018-08-12 DIAGNOSIS — N39.0 URINARY TRACT INFECTION WITHOUT HEMATURIA, SITE UNSPECIFIED: Primary | ICD-10-CM

## 2018-08-12 DIAGNOSIS — R19.7 DIARRHEA, UNSPECIFIED TYPE: ICD-10-CM

## 2018-08-12 DIAGNOSIS — R11.2 NON-INTRACTABLE VOMITING WITH NAUSEA, UNSPECIFIED VOMITING TYPE: ICD-10-CM

## 2018-08-12 LAB
ALBUMIN SERPL-MCNC: 4.2 G/DL (ref 3.5–5)
ALBUMIN/GLOB SERPL: 0.9 {RATIO} (ref 1.1–2.2)
ALP SERPL-CCNC: 62 U/L (ref 45–117)
ALT SERPL-CCNC: 17 U/L (ref 12–78)
ANION GAP SERPL CALC-SCNC: 9 MMOL/L (ref 5–15)
APPEARANCE UR: ABNORMAL
AST SERPL-CCNC: 23 U/L (ref 15–37)
BACTERIA URNS QL MICRO: ABNORMAL /HPF
BASOPHILS # BLD: 0 K/UL (ref 0–0.1)
BASOPHILS NFR BLD: 0 % (ref 0–1)
BILIRUB SERPL-MCNC: 0.4 MG/DL (ref 0.2–1)
BILIRUB UR QL: NEGATIVE
BUN SERPL-MCNC: 9 MG/DL (ref 6–20)
BUN/CREAT SERPL: 9 (ref 12–20)
CALCIUM SERPL-MCNC: 9.4 MG/DL (ref 8.5–10.1)
CHLORIDE SERPL-SCNC: 102 MMOL/L (ref 97–108)
CO2 SERPL-SCNC: 26 MMOL/L (ref 21–32)
COLOR UR: ABNORMAL
CREAT SERPL-MCNC: 1 MG/DL (ref 0.55–1.02)
DIFFERENTIAL METHOD BLD: ABNORMAL
EOSINOPHIL # BLD: 0.1 K/UL (ref 0–0.4)
EOSINOPHIL NFR BLD: 2 % (ref 0–7)
EPITH CASTS URNS QL MICRO: ABNORMAL /LPF
ERYTHROCYTE [DISTWIDTH] IN BLOOD BY AUTOMATED COUNT: 14.6 % (ref 11.5–14.5)
GLOBULIN SER CALC-MCNC: 4.6 G/DL (ref 2–4)
GLUCOSE SERPL-MCNC: 87 MG/DL (ref 65–100)
GLUCOSE UR STRIP.AUTO-MCNC: NEGATIVE MG/DL
HCG UR QL: NEGATIVE
HCT VFR BLD AUTO: 43.8 % (ref 35–47)
HGB BLD-MCNC: 14.2 G/DL (ref 11.5–16)
HGB UR QL STRIP: ABNORMAL
IMM GRANULOCYTES # BLD: 0 K/UL (ref 0–0.04)
IMM GRANULOCYTES NFR BLD AUTO: 0 % (ref 0–0.5)
KETONES UR QL STRIP.AUTO: NEGATIVE MG/DL
LEUKOCYTE ESTERASE UR QL STRIP.AUTO: ABNORMAL
LIPASE SERPL-CCNC: 107 U/L (ref 73–393)
LYMPHOCYTES # BLD: 1.8 K/UL (ref 0.8–3.5)
LYMPHOCYTES NFR BLD: 28 % (ref 12–49)
MCH RBC QN AUTO: 28.3 PG (ref 26–34)
MCHC RBC AUTO-ENTMCNC: 32.4 G/DL (ref 30–36.5)
MCV RBC AUTO: 87.3 FL (ref 80–99)
MONOCYTES # BLD: 0.5 K/UL (ref 0–1)
MONOCYTES NFR BLD: 8 % (ref 5–13)
NEUTS SEG # BLD: 3.9 K/UL (ref 1.8–8)
NEUTS SEG NFR BLD: 62 % (ref 32–75)
NITRITE UR QL STRIP.AUTO: NEGATIVE
NRBC # BLD: 0 K/UL (ref 0–0.01)
NRBC BLD-RTO: 0 PER 100 WBC
PH UR STRIP: 5.5 [PH] (ref 5–8)
PLATELET # BLD AUTO: 423 K/UL (ref 150–400)
PMV BLD AUTO: 9.2 FL (ref 8.9–12.9)
POTASSIUM SERPL-SCNC: 4.5 MMOL/L (ref 3.5–5.1)
PROT SERPL-MCNC: 8.8 G/DL (ref 6.4–8.2)
PROT UR STRIP-MCNC: 30 MG/DL
RBC # BLD AUTO: 5.02 M/UL (ref 3.8–5.2)
RBC #/AREA URNS HPF: ABNORMAL /HPF (ref 0–5)
SODIUM SERPL-SCNC: 137 MMOL/L (ref 136–145)
SP GR UR REFRACTOMETRY: 1.02 (ref 1–1.03)
UR CULT HOLD, URHOLD: NORMAL
UROBILINOGEN UR QL STRIP.AUTO: 0.2 EU/DL (ref 0.2–1)
WBC # BLD AUTO: 6.3 K/UL (ref 3.6–11)
WBC URNS QL MICRO: ABNORMAL /HPF (ref 0–4)

## 2018-08-12 PROCEDURE — 87086 URINE CULTURE/COLONY COUNT: CPT | Performed by: EMERGENCY MEDICINE

## 2018-08-12 PROCEDURE — 81001 URINALYSIS AUTO W/SCOPE: CPT | Performed by: EMERGENCY MEDICINE

## 2018-08-12 PROCEDURE — 74011250636 HC RX REV CODE- 250/636: Performed by: EMERGENCY MEDICINE

## 2018-08-12 PROCEDURE — 80053 COMPREHEN METABOLIC PANEL: CPT | Performed by: EMERGENCY MEDICINE

## 2018-08-12 PROCEDURE — 96374 THER/PROPH/DIAG INJ IV PUSH: CPT

## 2018-08-12 PROCEDURE — 85025 COMPLETE CBC W/AUTO DIFF WBC: CPT | Performed by: EMERGENCY MEDICINE

## 2018-08-12 PROCEDURE — 96361 HYDRATE IV INFUSION ADD-ON: CPT

## 2018-08-12 PROCEDURE — 36415 COLL VENOUS BLD VENIPUNCTURE: CPT | Performed by: EMERGENCY MEDICINE

## 2018-08-12 PROCEDURE — 83690 ASSAY OF LIPASE: CPT | Performed by: EMERGENCY MEDICINE

## 2018-08-12 PROCEDURE — 81025 URINE PREGNANCY TEST: CPT

## 2018-08-12 PROCEDURE — 99284 EMERGENCY DEPT VISIT MOD MDM: CPT

## 2018-08-12 RX ORDER — ONDANSETRON 8 MG/1
8 TABLET, ORALLY DISINTEGRATING ORAL
Qty: 12 TAB | Refills: 0 | OUTPATIENT
Start: 2018-08-12 | End: 2020-08-16

## 2018-08-12 RX ORDER — CEPHALEXIN 500 MG/1
500 CAPSULE ORAL 2 TIMES DAILY
Qty: 10 CAP | Refills: 0 | Status: SHIPPED | OUTPATIENT
Start: 2018-08-12 | End: 2018-08-17

## 2018-08-12 RX ORDER — ONDANSETRON 2 MG/ML
4 INJECTION INTRAMUSCULAR; INTRAVENOUS
Status: COMPLETED | OUTPATIENT
Start: 2018-08-12 | End: 2018-08-12

## 2018-08-12 RX ORDER — LOPERAMIDE HCL 2 MG
2 TABLET ORAL
Qty: 12 TAB | Refills: 0 | Status: SHIPPED | OUTPATIENT
Start: 2018-08-12 | End: 2018-08-15

## 2018-08-12 RX ORDER — NITROFURANTOIN 25; 75 MG/1; MG/1
100 CAPSULE ORAL 2 TIMES DAILY
Qty: 10 CAP | Refills: 0 | Status: SHIPPED | OUTPATIENT
Start: 2018-08-12 | End: 2018-08-12

## 2018-08-12 RX ADMIN — ONDANSETRON 4 MG: 2 INJECTION INTRAMUSCULAR; INTRAVENOUS at 11:08

## 2018-08-12 RX ADMIN — SODIUM CHLORIDE 1000 ML: 900 INJECTION, SOLUTION INTRAVENOUS at 11:08

## 2018-08-12 NOTE — ROUTINE PROCESS
Discharged by physician. IV removed, pt left ambulatory with spouse. Vital signs stable. Sean Gutierrez, ATA.

## 2018-08-12 NOTE — ED PROVIDER NOTES
HPI Comments: 23 y.o. female with past medical history significant for PCOS, nephrolithiasis, major depression, panic attack syndrome, ADHD and GERD who presents via private vehicle with chief complaint of vomiting. Pt reports 7 episodes of vomiting and diarrhea since 0800, ~3 hours ago, in the setting of nausea x3-4 days. Pt also notes current diffuse abd cramping and recent slight urinary frequency. Pt explains that she is currently undergoing fertility treatment and states she took letrozol ~1 month ago and had a fertility injection , ~2 weeks ago. Pt states her LMP was 2018, ~2 months ago. Pt denies fever and chills. Pt denies previous abd surgeries. Pt denies recent change in diet. There are no other acute medical concerns at this time. Social hx: ; Former tobacco smoker (denies tobacco use today); Denies EtOH use; Denies illic    Note written by Giovanni Coronado, as dictated by Jillian Vasquez. Alan Smith MD 11:04 AM      The history is provided by the patient and the spouse. No  was used. Past Medical History:   Diagnosis Date    ADHD (attention deficit hyperactivity disorder)     Dr. Brett Arzola Ankle fracture, left     4x    Asthma attack     Chronic kidney disease     kidney stones    Depression, major 2013    Dr. Rooney  allergy     beef (severe), tomoato (severe), others. Dr. Ihsan Moss GERD (gastroesophageal reflux disease)     IUD (intrauterine device) in place 2014    Dr. Megan Santiago    Nephrolithiasis     last 2012 s/p lithotripsy. calcium    Panic anxiety syndrome 2013    Dr. Brett Arzola Pap smear for cervical cancer screening     Dr Megan Santiago       Past Surgical History:   Procedure Laterality Date    HX TONSIL AND ADENOIDECTOMY      LITHOTRIPSY  3 total         No family history on file.     Social History     Social History    Marital status:      Spouse name: N/A    Number of children: N/A    Years of education: N/A Occupational History    Not on file. Social History Main Topics    Smoking status: Current Every Day Smoker     Types: Cigarettes    Smokeless tobacco: Never Used    Alcohol use No    Drug use: No    Sexual activity: Yes     Other Topics Concern    Not on file     Social History Narrative         ALLERGIES: Hydrocodone; Bactrim [sulfamethoprim ds]; and Griseofulvin    Review of Systems   Constitutional: Negative for chills and fever. HENT: Negative for ear pain and sore throat. Eyes: Negative for pain. Respiratory: Negative for chest tightness and shortness of breath. Cardiovascular: Negative for chest pain and leg swelling. Gastrointestinal: Positive for abdominal pain, diarrhea, nausea and vomiting. Genitourinary: Positive for frequency. Negative for dysuria and flank pain. Musculoskeletal: Negative for back pain. Skin: Negative for rash. Neurological: Negative for headaches. All other systems reviewed and are negative. Vitals:    08/12/18 1027   BP: (!) 134/94   Pulse: 99   Resp: 18   Temp: 99.3 °F (37.4 °C)   SpO2: 100%   Weight: 97.5 kg (215 lb)   Height: 5' (1.524 m)            Physical Exam   Constitutional: No distress. Obese   HENT:   Head: Normocephalic and atraumatic. Mouth/Throat: Oropharynx is clear and moist.   Eyes: Conjunctivae are normal. Pupils are equal, round, and reactive to light. No scleral icterus. Neck: Neck supple. No tracheal deviation present. Cardiovascular: Normal rate, regular rhythm and intact distal pulses. Pulmonary/Chest: Effort normal. No respiratory distress. She has no wheezes. She has no rales. Abdominal: Soft. She exhibits no distension. There is no tenderness. Genitourinary:   Genitourinary Comments: deferred   Musculoskeletal: She exhibits no edema or deformity. No CVA tenderness   Neurological: She is alert. Skin: Skin is warm and dry. Psychiatric: She has a normal mood and affect.    Nursing note and vitals reviewed. Note written by Giovanni Linares, as dictated by Tr Estrada. Fredi Bhardwaj MD 11:04 AM    MDM  Number of Diagnoses or Management Options  Diarrhea, unspecified type:   Non-intractable vomiting with nausea, unspecified vomiting type:   Urinary tract infection without hematuria, site unspecified:   Diagnosis management comments: Diff dx: UTI, CHAU, dehydration    Patient Progress  Patient progress: improved (Tolerated PO well)        ED Course       Procedures    12:43 PM  The patient has been reevaluated. The patient is ready for discharge. The patient's signs, symptoms, diagnosis, and discharge instructions have been discussed and the patient/ family has conveyed their understanding. The patient is to follow up as recommended or return to the ED should their symptoms worsen. Plan has been discussed and the patient is in agreement. LABORATORY TESTS:  Labs Reviewed   CBC WITH AUTOMATED DIFF - Abnormal; Notable for the following:        Result Value    RDW 14.6 (*)     PLATELET 861 (*)     All other components within normal limits   METABOLIC PANEL, COMPREHENSIVE - Abnormal; Notable for the following:     BUN/Creatinine ratio 9 (*)     Protein, total 8.8 (*)     Globulin 4.6 (*)     A-G Ratio 0.9 (*)     All other components within normal limits   URINALYSIS W/MICROSCOPIC - Abnormal; Notable for the following:     Appearance HAZY (*)     Protein 30 (*)     Blood TRACE (*)     Leukocyte Esterase SMALL (*)     Epithelial cells MODERATE (*)     Bacteria 1+ (*)     All other components within normal limits   URINE CULTURE HOLD SAMPLE   CULTURE, URINE   SAMPLES BEING HELD   LIPASE   HCG URINE, QL. - POC       IMAGING RESULTS:  No results found. MEDICATIONS GIVEN:  Medications   ondansetron (ZOFRAN) injection 4 mg (4 mg IntraVENous Given 8/12/18 1108)   sodium chloride 0.9 % bolus infusion 1,000 mL (1,000 mL IntraVENous New Bag 8/12/18 1108)       IMPRESSION:  1.  Urinary tract infection without hematuria, site unspecified    2. Non-intractable vomiting with nausea, unspecified vomiting type    3. Diarrhea, unspecified type        PLAN:  1. Current Discharge Medication List      START taking these medications    Details   loperamide (IMODIUM A-D) 2 mg tablet Take 1 Tab by mouth four (4) times daily as needed for Diarrhea for up to 3 days. Qty: 12 Tab, Refills: 0      nitrofurantoin, macrocrystal-monohydrate, (MACROBID) 100 mg capsule Take 1 Cap by mouth two (2) times a day for 5 days. Qty: 10 Cap, Refills: 0         CONTINUE these medications which have CHANGED    Details   ondansetron (ZOFRAN ODT) 8 mg disintegrating tablet Take 1 Tab by mouth every eight (8) hours as needed for Nausea. Qty: 12 Tab, Refills: 0           2. Follow-up Information     Follow up With Details Comments [address] [place] [address],[address] Schedule an appointment as soon as possible for a visit  Reece Balta Kitty   [phone]    [facility] EMERGENCY DEPT  If symptoms worsen or new concerns [address] [place] [address]  [phone]        3. Return to ED for new or worsening symptoms       Ran Li.  Ada Apgar, MD

## 2018-08-12 NOTE — ED TRIAGE NOTES
\"I have been vomiting and having diarrhea since 8am this morning and I'm getting really dizzy. \" Patient denies fever, has not checked. Denies other sick household members. Had a sub for dinner last night.

## 2018-08-14 LAB
BACTERIA SPEC CULT: NORMAL
CC UR VC: NORMAL
SERVICE CMNT-IMP: NORMAL

## 2019-01-25 ENCOUNTER — APPOINTMENT (OUTPATIENT)
Dept: CT IMAGING | Age: 24
End: 2019-01-25
Attending: EMERGENCY MEDICINE
Payer: COMMERCIAL

## 2019-01-25 ENCOUNTER — HOSPITAL ENCOUNTER (EMERGENCY)
Age: 24
Discharge: HOME | End: 2019-01-25
Attending: EMERGENCY MEDICINE | Admitting: EMERGENCY MEDICINE
Payer: COMMERCIAL

## 2019-01-25 VITALS
RESPIRATION RATE: 16 BRPM | SYSTOLIC BLOOD PRESSURE: 134 MMHG | BODY MASS INDEX: 42.21 KG/M2 | DIASTOLIC BLOOD PRESSURE: 89 MMHG | OXYGEN SATURATION: 100 % | HEART RATE: 84 BPM | TEMPERATURE: 98.5 F | HEIGHT: 60 IN | WEIGHT: 215 LBS

## 2019-01-25 DIAGNOSIS — N20.0 KIDNEY STONE ON RIGHT SIDE: Primary | ICD-10-CM

## 2019-01-25 DIAGNOSIS — R82.71 BACTERIURIA: ICD-10-CM

## 2019-01-25 LAB
ALBUMIN SERPL-MCNC: 4.3 G/DL (ref 3.5–5)
ALBUMIN/GLOB SERPL: 1 {RATIO} (ref 1.1–2.2)
ALP SERPL-CCNC: 57 U/L (ref 45–117)
ALT SERPL-CCNC: 13 U/L (ref 12–78)
ANION GAP SERPL CALC-SCNC: 15 MMOL/L (ref 5–15)
APPEARANCE UR: ABNORMAL
AST SERPL-CCNC: 19 U/L (ref 15–37)
BACTERIA URNS QL MICRO: ABNORMAL /HPF
BASOPHILS # BLD: 0 K/UL (ref 0–0.1)
BASOPHILS NFR BLD: 0 % (ref 0–1)
BILIRUB SERPL-MCNC: 0.6 MG/DL (ref 0.2–1)
BILIRUB UR QL: NEGATIVE
BUN SERPL-MCNC: 10 MG/DL (ref 6–20)
BUN/CREAT SERPL: 10 (ref 12–20)
CALCIUM SERPL-MCNC: 9.2 MG/DL (ref 8.5–10.1)
CHLORIDE SERPL-SCNC: 101 MMOL/L (ref 97–108)
CO2 SERPL-SCNC: 22 MMOL/L (ref 21–32)
COLOR UR: ABNORMAL
CREAT SERPL-MCNC: 0.98 MG/DL (ref 0.55–1.02)
DIFFERENTIAL METHOD BLD: ABNORMAL
EOSINOPHIL # BLD: 0.1 K/UL (ref 0–0.4)
EOSINOPHIL NFR BLD: 1 % (ref 0–7)
EPITH CASTS URNS QL MICRO: ABNORMAL /LPF
ERYTHROCYTE [DISTWIDTH] IN BLOOD BY AUTOMATED COUNT: 14.3 % (ref 11.5–14.5)
GLOBULIN SER CALC-MCNC: 4.4 G/DL (ref 2–4)
GLUCOSE SERPL-MCNC: 77 MG/DL (ref 65–100)
GLUCOSE UR STRIP.AUTO-MCNC: NEGATIVE MG/DL
HCG UR QL: NEGATIVE
HCT VFR BLD AUTO: 44.1 % (ref 35–47)
HGB BLD-MCNC: 14.1 G/DL (ref 11.5–16)
HGB UR QL STRIP: ABNORMAL
IMM GRANULOCYTES # BLD AUTO: 0 K/UL (ref 0–0.04)
IMM GRANULOCYTES NFR BLD AUTO: 0 % (ref 0–0.5)
KETONES UR QL STRIP.AUTO: ABNORMAL MG/DL
LEUKOCYTE ESTERASE UR QL STRIP.AUTO: ABNORMAL
LYMPHOCYTES # BLD: 2.2 K/UL (ref 0.8–3.5)
LYMPHOCYTES NFR BLD: 36 % (ref 12–49)
MCH RBC QN AUTO: 27.9 PG (ref 26–34)
MCHC RBC AUTO-ENTMCNC: 32 G/DL (ref 30–36.5)
MCV RBC AUTO: 87.3 FL (ref 80–99)
MONOCYTES # BLD: 0.5 K/UL (ref 0–1)
MONOCYTES NFR BLD: 7 % (ref 5–13)
MUCOUS THREADS URNS QL MICRO: ABNORMAL /LPF
NEUTS SEG # BLD: 3.4 K/UL (ref 1.8–8)
NEUTS SEG NFR BLD: 55 % (ref 32–75)
NITRITE UR QL STRIP.AUTO: NEGATIVE
NRBC # BLD: 0 K/UL (ref 0–0.01)
NRBC BLD-RTO: 0 PER 100 WBC
PH UR STRIP: 6 [PH] (ref 5–8)
PLATELET # BLD AUTO: 427 K/UL (ref 150–400)
PMV BLD AUTO: 9.5 FL (ref 8.9–12.9)
POTASSIUM SERPL-SCNC: 4.3 MMOL/L (ref 3.5–5.1)
PROT SERPL-MCNC: 8.7 G/DL (ref 6.4–8.2)
PROT UR STRIP-MCNC: NEGATIVE MG/DL
RBC # BLD AUTO: 5.05 M/UL (ref 3.8–5.2)
RBC #/AREA URNS HPF: ABNORMAL /HPF (ref 0–5)
SODIUM SERPL-SCNC: 138 MMOL/L (ref 136–145)
SP GR UR REFRACTOMETRY: 1.02 (ref 1–1.03)
UR CULT HOLD, URHOLD: NORMAL
UROBILINOGEN UR QL STRIP.AUTO: 0.2 EU/DL (ref 0.2–1)
WBC # BLD AUTO: 6.2 K/UL (ref 3.6–11)
WBC URNS QL MICRO: ABNORMAL /HPF (ref 0–4)

## 2019-01-25 PROCEDURE — 99284 EMERGENCY DEPT VISIT MOD MDM: CPT

## 2019-01-25 PROCEDURE — 85025 COMPLETE CBC W/AUTO DIFF WBC: CPT

## 2019-01-25 PROCEDURE — 36415 COLL VENOUS BLD VENIPUNCTURE: CPT

## 2019-01-25 PROCEDURE — 87086 URINE CULTURE/COLONY COUNT: CPT

## 2019-01-25 PROCEDURE — 74011250637 HC RX REV CODE- 250/637: Performed by: PHYSICIAN ASSISTANT

## 2019-01-25 PROCEDURE — 96375 TX/PRO/DX INJ NEW DRUG ADDON: CPT

## 2019-01-25 PROCEDURE — 80053 COMPREHEN METABOLIC PANEL: CPT

## 2019-01-25 PROCEDURE — 74176 CT ABD & PELVIS W/O CONTRAST: CPT

## 2019-01-25 PROCEDURE — 96374 THER/PROPH/DIAG INJ IV PUSH: CPT

## 2019-01-25 PROCEDURE — 74011250636 HC RX REV CODE- 250/636: Performed by: EMERGENCY MEDICINE

## 2019-01-25 PROCEDURE — 74011250636 HC RX REV CODE- 250/636: Performed by: PHYSICIAN ASSISTANT

## 2019-01-25 PROCEDURE — 81001 URINALYSIS AUTO W/SCOPE: CPT

## 2019-01-25 PROCEDURE — 81025 URINE PREGNANCY TEST: CPT

## 2019-01-25 RX ORDER — OXYCODONE AND ACETAMINOPHEN 5; 325 MG/1; MG/1
1 TABLET ORAL
Qty: 15 TAB | Refills: 0 | Status: SHIPPED | OUTPATIENT
Start: 2019-01-25 | End: 2019-01-25

## 2019-01-25 RX ORDER — HYDROCODONE BITARTRATE AND ACETAMINOPHEN 7.5; 325 MG/1; MG/1
1 TABLET ORAL
Qty: 15 TAB | Refills: 0 | OUTPATIENT
Start: 2019-01-25 | End: 2020-08-16

## 2019-01-25 RX ORDER — HYDROCODONE BITARTRATE AND ACETAMINOPHEN 7.5; 325 MG/1; MG/1
1 TABLET ORAL
Status: COMPLETED | OUTPATIENT
Start: 2019-01-25 | End: 2019-01-25

## 2019-01-25 RX ORDER — ONDANSETRON 4 MG/1
4 TABLET, ORALLY DISINTEGRATING ORAL
Qty: 10 TAB | Refills: 0 | OUTPATIENT
Start: 2019-01-25 | End: 2020-08-16

## 2019-01-25 RX ORDER — MORPHINE SULFATE 4 MG/ML
6 INJECTION INTRAVENOUS
Status: COMPLETED | OUTPATIENT
Start: 2019-01-25 | End: 2019-01-25

## 2019-01-25 RX ORDER — KETOROLAC TROMETHAMINE 30 MG/ML
30 INJECTION, SOLUTION INTRAMUSCULAR; INTRAVENOUS
Status: COMPLETED | OUTPATIENT
Start: 2019-01-25 | End: 2019-01-25

## 2019-01-25 RX ORDER — ONDANSETRON 2 MG/ML
4 INJECTION INTRAMUSCULAR; INTRAVENOUS
Status: COMPLETED | OUTPATIENT
Start: 2019-01-25 | End: 2019-01-25

## 2019-01-25 RX ORDER — CEPHALEXIN 500 MG/1
500 CAPSULE ORAL 3 TIMES DAILY
Qty: 21 CAP | Refills: 0 | Status: SHIPPED | OUTPATIENT
Start: 2019-01-25 | End: 2019-02-01

## 2019-01-25 RX ADMIN — HYDROCODONE BITARTRATE AND ACETAMINOPHEN 1 TABLET: 7.5; 325 TABLET ORAL at 17:05

## 2019-01-25 RX ADMIN — KETOROLAC TROMETHAMINE 30 MG: 30 INJECTION, SOLUTION INTRAMUSCULAR at 17:05

## 2019-01-25 RX ADMIN — ONDANSETRON 4 MG: 2 INJECTION INTRAMUSCULAR; INTRAVENOUS at 15:38

## 2019-01-25 RX ADMIN — SODIUM CHLORIDE 1000 ML: 900 INJECTION, SOLUTION INTRAVENOUS at 15:39

## 2019-01-25 RX ADMIN — MORPHINE SULFATE 6 MG: 4 INJECTION, SOLUTION INTRAMUSCULAR; INTRAVENOUS at 15:40

## 2019-01-25 NOTE — ED PROVIDER NOTES
20 y.o. female with past medical history significant for GERD, nephrolithiasis, depression, ADHD and anxiety who presents from home via personal vehicle with chief complaint of R flank pain. Pt states 1 week ago she began with \"dark colored\" urine but not really hematuria which lasted a few days but no pain. Then yesterday she woke up in the middle of the night with right flank pain. Pt states she has a 15 year history of kidney stones. Pt states she has had similiar symptoms before with other kidney stones. Pt states she is very nauseated. Today, pt rates pain at 7/10. Pt states the first day of her last period was 2 months ago. Pt states she is going through fertility treatments and just took Provera. Pt denies vomiting, pain with urination, and blood in urine. No concerns for STI. No vaginal discharge or bleeding. Due for her period in the next week. Social hx: Current tobacco smoker, No EtOH use There are no other acute medical concerns at this time. Note written by Dalton Inman. Kenisha Andino, as dictated by Emperatriz Land MD 3:10 PM 
 
 
 
 
 
 
 
The history is provided by the patient and the spouse. No  was used. Past Medical History:  
Diagnosis Date  ADHD (attention deficit hyperactivity disorder) 2008 Dr. Ivor Duverney  Ankle fracture, left 4x  Asthma attack  Chronic kidney disease   
 kidney stones  Depression, major 8/2013 Dr. Ivor Duverney  Food allergy 2010  
 beef (severe), tomoato (severe), others. Dr. Era Durbin  GERD (gastroesophageal reflux disease)  IUD (intrauterine device) in place 7/2014 Dr. Felicita Francisco  Nephrolithiasis   
 last 8/2012 s/p lithotripsy. calcium  Panic anxiety syndrome 8/2013 Dr. Ivor Duverney  Pap smear for cervical cancer screening Dr Felicita Francisco Past Surgical History:  
Procedure Laterality Date  HX TONSIL AND ADENOIDECTOMY  LITHOTRIPSY  3 total  
 
   
No family history on file. Social History Socioeconomic History  Marital status:  Spouse name: Not on file  Number of children: Not on file  Years of education: Not on file  Highest education level: Not on file Social Needs  Financial resource strain: Not on file  Food insecurity - worry: Not on file  Food insecurity - inability: Not on file  Transportation needs - medical: Not on file  Transportation needs - non-medical: Not on file Occupational History  Not on file Tobacco Use  Smoking status: Current Every Day Smoker Types: Cigarettes  Smokeless tobacco: Never Used Substance and Sexual Activity  Alcohol use: No  
 Drug use: No  
 Sexual activity: Yes Other Topics Concern  Not on file Social History Narrative  Not on file ALLERGIES: Hydrocodone; Bactrim [sulfamethoprim ds]; and Griseofulvin Review of Systems Constitutional: Negative. Negative for activity change, chills, fatigue and unexpected weight change. HENT: Negative for trouble swallowing. Respiratory: Negative for cough, chest tightness, shortness of breath and wheezing. Cardiovascular: Negative. Negative for chest pain and palpitations. Gastrointestinal: Negative. Negative for abdominal pain, diarrhea, nausea and vomiting. Genitourinary: Positive for dysuria, flank pain (right), frequency, hematuria and urgency. Negative for pelvic pain. Musculoskeletal: Negative for arthralgias, back pain, neck pain and neck stiffness. Skin: Negative. Negative for color change and rash. Neurological: Negative. Negative for dizziness, numbness and headaches. All other systems reviewed and are negative. Vitals:  
 01/25/19 1513 Pulse: 85 Resp: 16 Weight: 97.5 kg (215 lb) Height: 5' (1.524 m) Physical Exam  
Constitutional: She is oriented to person, place, and time. She appears well-developed and well-nourished. She is active. Non-toxic appearance. No distress. AA female, elevated BMI HENT:  
Head: Normocephalic and atraumatic. Eyes: Conjunctivae are normal. Pupils are equal, round, and reactive to light. Right eye exhibits no discharge. Left eye exhibits no discharge. Neck: Normal range of motion and full passive range of motion without pain. No tracheal tenderness present. Cardiovascular: Normal rate, regular rhythm, normal heart sounds, intact distal pulses and normal pulses. Exam reveals no gallop and no friction rub. No murmur heard. Pulmonary/Chest: Effort normal and breath sounds normal. No respiratory distress. She has no wheezes. She has no rales. She exhibits no tenderness. Abdominal: Soft. Bowel sounds are normal. She exhibits no distension. There is no tenderness. There is no rebound and no guarding. R CVAT; no L CVAT Musculoskeletal: Normal range of motion. She exhibits no edema or tenderness. Neurological: She is alert and oriented to person, place, and time. She has normal strength. No cranial nerve deficit or sensory deficit. Coordination normal.  
Skin: Skin is warm, dry and intact. Capillary refill takes less than 2 seconds. No abrasion and no rash noted. She is not diaphoretic. No erythema. Psychiatric: She has a normal mood and affect. Her speech is normal and behavior is normal. Cognition and memory are normal.  
Nursing note and vitals reviewed. MDM Number of Diagnoses or Management Options Kidney stone on right side:  
Diagnosis management comments:  
Ddx: pregnancy, UTI, kidney stone, pyelo, appendicitis Amount and/or Complexity of Data Reviewed Clinical lab tests: ordered and reviewed Tests in the radiology section of CPT®: ordered and reviewed Review and summarize past medical records: yes Discuss the patient with other providers: yes Patient Progress Patient progress: stable Procedures The patient was seen by the supervising physician who was the provider in triage. I discussed patient's PMH, exam findings as well as careplan with the attending who agrees with care plan. Alessio Liriano PA-C 
 
 
5mm mid-ureteral stone on the right, hx of stones. Afebrile here, pain controlled, no vomiting. Plan to discharge with urology f/u. Stone hotline information given with f/u strongly encouraged. D/W Dr. Feng Schwartz who agrees with plan. Alessio Liriano PA-C 
 
 
LABORATORY TESTS: 
Recent Results (from the past 12 hour(s)) CBC WITH AUTOMATED DIFF Collection Time: 01/25/19  3:29 PM  
Result Value Ref Range WBC 6.2 3.6 - 11.0 K/uL  
 RBC 5.05 3.80 - 5.20 M/uL  
 HGB 14.1 11.5 - 16.0 g/dL HCT 44.1 35.0 - 47.0 % MCV 87.3 80.0 - 99.0 FL  
 MCH 27.9 26.0 - 34.0 PG  
 MCHC 32.0 30.0 - 36.5 g/dL  
 RDW 14.3 11.5 - 14.5 % PLATELET 655 (H) 995 - 400 K/uL MPV 9.5 8.9 - 12.9 FL  
 NRBC 0.0 0  WBC ABSOLUTE NRBC 0.00 0.00 - 0.01 K/uL NEUTROPHILS 55 32 - 75 % LYMPHOCYTES 36 12 - 49 % MONOCYTES 7 5 - 13 % EOSINOPHILS 1 0 - 7 % BASOPHILS 0 0 - 1 % IMMATURE GRANULOCYTES 0 0.0 - 0.5 % ABS. NEUTROPHILS 3.4 1.8 - 8.0 K/UL  
 ABS. LYMPHOCYTES 2.2 0.8 - 3.5 K/UL  
 ABS. MONOCYTES 0.5 0.0 - 1.0 K/UL  
 ABS. EOSINOPHILS 0.1 0.0 - 0.4 K/UL  
 ABS. BASOPHILS 0.0 0.0 - 0.1 K/UL  
 ABS. IMM. GRANS. 0.0 0.00 - 0.04 K/UL  
 DF AUTOMATED URINALYSIS W/MICROSCOPIC Collection Time: 01/25/19  3:29 PM  
Result Value Ref Range Color YELLOW/STRAW Appearance CLOUDY (A) CLEAR Specific gravity 1.024 1.003 - 1.030    
 pH (UA) 6.0 5.0 - 8.0 Protein NEGATIVE  NEG mg/dL Glucose NEGATIVE  NEG mg/dL Ketone TRACE (A) NEG mg/dL Bilirubin NEGATIVE  NEG Blood LARGE (A) NEG Urobilinogen 0.2 0.2 - 1.0 EU/dL Nitrites NEGATIVE  NEG Leukocyte Esterase SMALL (A) NEG    
 WBC PENDING /hpf  
 RBC PENDING /hpf Epithelial cells PENDING /lpf Bacteria PENDING /hpf URINE CULTURE HOLD SAMPLE  Collection Time: 01/25/19  3:29 PM  
 Result Value Ref Range Urine culture hold URINE ON HOLD IN MICROBIOLOGY DEPT FOR 3 DAYS. IF UNPRESERVED URINE IS SUBMITTED, IT CANNOT BE USED FOR ADDITIONAL TESTING AFTER 24 HRS, RECOLLECTION WILL BE REQUIRED. HCG URINE, QL. - POC Collection Time: 01/25/19  3:37 PM  
Result Value Ref Range Pregnancy test,urine (POC) NEGATIVE  NEG    
 
 
IMAGING RESULTS: 
Result Information Status: Final result (Exam End: 1/25/2019 16:09) Provider Status: Open Study Result EXAM: CT ABD PELV WO CONT 
  
INDICATION: Right flank pain and dysuria, nausea, history of renal stones 
  
COMPARISON: 7/6/2017 
  
CONTRAST:  None. 
  
TECHNIQUE:  
Thin axial images were obtained through the abdomen and pelvis. Coronal and 
sagittal reconstructions were generated. Oral contrast was not administered. CT 
dose reduction was achieved through use of a standardized protocol tailored for 
this examination and automatic exposure control for dose modulation.  
  
The absence of intravenous contrast material reduces the sensitivity for 
evaluation of the solid parenchymal organs of the abdomen.  
  
FINDINGS:  
LUNG BASES: Clear. INCIDENTALLY IMAGED HEART AND MEDIASTINUM: Unremarkable. LIVER: No mass or biliary dilatation. GALLBLADDER: Unremarkable. SPLEEN: No mass. PANCREAS: No mass or ductal dilatation. ADRENALS: Unremarkable. KIDNEYS/URETERS: Small nonobstructing renal stones are noted bilaterally, the 
largest of which measures 4 mm. There is a 5 mm stone in the right mid ureter 
without hydronephrosis. STOMACH: Unremarkable. SMALL BOWEL: No dilatation or wall thickening. COLON: No dilatation or wall thickening. APPENDIX: Not visualized. PERITONEUM: No ascites or pneumoperitoneum. RETROPERITONEUM: No lymphadenopathy or aortic aneurysm. REPRODUCTIVE ORGANS: There is no pelvic mass or lymphadenopathy. URINARY BLADDER: No mass or calculus. BONES: No destructive bone lesion.  
ADDITIONAL COMMENTS: N/A 
  
 IMPRESSION IMPRESSION: 
5 mm stone right mid ureter without hydronephrosis. Bilateral nonobstructing 
renal stones. MEDICATIONS GIVEN: 
Medications  
sodium chloride 0.9 % bolus infusion 1,000 mL (1,000 mL IntraVENous New Bag 1/25/19 1539) ondansetron (ZOFRAN) injection 4 mg (4 mg IntraVENous Given 1/25/19 1538) morphine injection 6 mg (6 mg IntraVENous Given 1/25/19 1540) DISCHARGE NOTE: 
4:30 PM 
The patient's results have been reviewed with them and/or available family. Patient and/or family verbally conveyed their understanding and agreement of the patient's signs, symptoms, diagnosis, treatment and prognosis and additionally agree to follow up as recommended in the discharge instructions or to return to the Emergency Room should their condition change prior to their follow-up appointment. The patient/family verbally agrees with the care-plan and verbally conveys that all of their questions have been answered. The discharge instructions have also been provided to the patient and/or family with some educational information regarding the patient's diagnosis as well a list of reasons why the patient would want to return to the ER prior to their follow-up appointment, should their condition change. Plan: 
1. F/U with urology; ER if sx's worsen 2. Rx norco, zofran, keflex- urine cx pending 3. Urine strainer given, encouraged PO intake Return precautions discussed and advised to return to ER if worse

## 2019-01-25 NOTE — DISCHARGE INSTRUCTIONS
Patient Education   Call [phone]) to schedule follow-up appointment for your kidney stone. Kidney Stone: Care Instructions  Your Care Instructions    Kidney stones are formed when salts, minerals, and other substances normally found in the urine clump together. They can be as small as grains of sand or, rarely, as large as golf balls. While the stone is traveling through the ureter, which is the tube that carries urine from the kidney to the bladder, you will probably feel pain. The pain may be mild or very severe. You may also have some blood in your urine. As soon as the stone reaches the bladder, any intense pain should go away. If a stone is too large to pass on its own, you may need a medical procedure to help you pass the stone. The doctor has checked you carefully, but problems can develop later. If you notice any problems or new symptoms, get medical treatment right away. Follow-up care is a key part of your treatment and safety. Be sure to make and go to all appointments, and call your doctor if you are having problems. It's also a good idea to know your test results and keep a list of the medicines you take. How can you care for yourself at home? · Drink plenty of fluids, enough so that your urine is light yellow or clear like water. If you have kidney, heart, or liver disease and have to limit fluids, talk with your doctor before you increase the amount of fluids you drink. · Take pain medicines exactly as directed. Call your doctor if you think you are having a problem with your medicine. ? If the doctor gave you a prescription medicine for pain, take it as prescribed. ? If you are not taking a prescription pain medicine, ask your doctor if you can take an over-the-counter medicine. Read and follow all instructions on the label. · Your doctor may ask you to strain your urine so that you can collect your kidney stone when it passes.  You can use a kitchen strainer or a tea strainer to catch the stone. Store it in a plastic bag until you see your doctor again. Preventing future kidney stones  Some changes in your diet may help prevent kidney stones. Depending on the cause of your stones, your doctor may recommend that you:  · Drink plenty of fluids, enough so that your urine is light yellow or clear like water. If you have kidney, heart, or liver disease and have to limit fluids, talk with your doctor before you increase the amount of fluids you drink. · Limit coffee, tea, and alcohol. Also avoid grapefruit juice. · Do not take more than the recommended daily dose of vitamins C and D.  · Avoid antacids such as Gaviscon, Maalox, Mylanta, or Tums. · Limit the amount of salt (sodium) in your diet. · Eat a balanced diet that is not too high in protein. · Limit foods that are high in a substance called oxalate, which can cause kidney stones. These foods include dark green vegetables, rhubarb, chocolate, wheat bran, nuts, cranberries, and beans. When should you call for help? Call your doctor now or seek immediate medical care if:    · You cannot keep down fluids.     · Your pain gets worse.     · You have a fever or chills.     · You have new or worse pain in your back just below your rib cage (the flank area).     · You have new or more blood in your urine.    Watch closely for changes in your health, and be sure to contact your doctor if:    · You do not get better as expected. Where can you learn more? Go to http://victoria-cristiano.info/. Enter U252 in the search box to learn more about \"Kidney Stone: Care Instructions. \"  Current as of: March 14, 2018  Content Version: 11.9  © 0736-7028 [employer]. Care instructions adapted under license by [employer] (which disclaims liability or warranty for this information).  If you have questions about a medical condition or this instruction, always ask your healthcare professional. [employer] disclaims any warranty or liability for your use of this information. Patient Education        Learning About Diet for Kidney Stone Prevention  What are kidney stones? Kidney stones are made of salts and minerals in the urine that form small \"sara. \" Stones can form in the kidneys and the ureters (the tubes that lead from the kidneys to the bladder). They can also form in the bladder. Stones may not cause a problem as long as they stay in the kidneys. But they can cause sudden, severe pain. Pain is most likely when the stones travel from the kidneys to the bladder. Kidney stones can cause bloody urine. Kidney stones often run in families. You are more likely to get them if you don't drink enough fluids, mainly water. Certain foods and drinks and some dietary supplements may also increase your risk for kidney stones if you consume too much of them. What can you do to prevent kidney stones? Changing what you eat may not prevent all types of kidney stones. But for people who have a history of certain kinds of kidney stones, some changes in diet may help. A dietitian can help you set up a meal plan that includes healthy, low-oxalate choices. Here are some general guidelines to get you started. Plan your meals and snacks around foods that are low in oxalate. These foods include:  · Corn, kale, parsnips, and squash,. · Beef, chicken, pork, turkey, and fish. · Milk, butter, cheese, and yogurt. You can eat certain foods that are medium-high in oxalate, but eat them only once in a while. These foods include:  · Bread. · Brown rice. · English muffins. · Figs. · Popcorn. · String beans. · Tomatoes. Limit very high-oxalate foods, including:  · Black tea. · Coffee. · Chocolate. · Dark green vegetables. · Nuts. Here are some other things you can do to help prevent kidney stones. · Drink plenty of fluids.  If you have kidney, heart, or liver disease and have to limit fluids, talk with your doctor before you increase the amount of fluids you drink. · Do not take more than the recommended daily dose of vitamins C and D.  · Limit the salt in your diet. · Eat a balanced diet that is not too high in protein. Follow-up care is a key part of your treatment and safety. Be sure to make and go to all appointments, and call your doctor if you are having problems. It's also a good idea to know your test results and keep a list of the medicines you take. Where can you learn more? Go to http://victoria-cristiano.info/. Enter C138 in the search box to learn more about \"Learning About Diet for Kidney Stone Prevention. \"  Current as of: March 28, 2018  Content Version: 11.9  © 7355-4107 [employer]. Care instructions adapted under license by [employer] (which disclaims liability or warranty for this information). If you have questions about a medical condition or this instruction, always ask your healthcare professional. [address] any warranty or liability for your use of this information.

## 2019-01-27 LAB
BACTERIA SPEC CULT: NORMAL
CC UR VC: NORMAL
SERVICE CMNT-IMP: NORMAL

## 2019-01-28 ENCOUNTER — HOSPITAL ENCOUNTER (EMERGENCY)
Age: 24
Discharge: HOME | End: 2019-01-28
Attending: EMERGENCY MEDICINE
Payer: COMMERCIAL

## 2019-01-28 VITALS
RESPIRATION RATE: 19 BRPM | HEART RATE: 86 BPM | HEIGHT: 60 IN | SYSTOLIC BLOOD PRESSURE: 119 MMHG | TEMPERATURE: 99.2 F | OXYGEN SATURATION: 98 % | BODY MASS INDEX: 42.21 KG/M2 | DIASTOLIC BLOOD PRESSURE: 84 MMHG | WEIGHT: 215 LBS

## 2019-01-28 DIAGNOSIS — N20.1 URETEROLITHIASIS: Primary | ICD-10-CM

## 2019-01-28 DIAGNOSIS — R10.9 RIGHT FLANK PAIN: ICD-10-CM

## 2019-01-28 LAB
ALBUMIN SERPL-MCNC: 4.1 G/DL (ref 3.5–5)
ALBUMIN/GLOB SERPL: 1 {RATIO} (ref 1.1–2.2)
ALP SERPL-CCNC: 59 U/L (ref 45–117)
ALT SERPL-CCNC: 12 U/L (ref 12–78)
ANION GAP SERPL CALC-SCNC: 12 MMOL/L (ref 5–15)
APPEARANCE UR: CLEAR
AST SERPL-CCNC: 12 U/L (ref 15–37)
BACTERIA URNS QL MICRO: ABNORMAL /HPF
BASOPHILS # BLD: 0 K/UL (ref 0–0.1)
BASOPHILS NFR BLD: 1 % (ref 0–1)
BILIRUB SERPL-MCNC: 0.3 MG/DL (ref 0.2–1)
BILIRUB UR QL: NEGATIVE
BUN SERPL-MCNC: 12 MG/DL (ref 6–20)
BUN/CREAT SERPL: 10 (ref 12–20)
CALCIUM SERPL-MCNC: 9 MG/DL (ref 8.5–10.1)
CHLORIDE SERPL-SCNC: 103 MMOL/L (ref 97–108)
CO2 SERPL-SCNC: 25 MMOL/L (ref 21–32)
COLOR UR: ABNORMAL
CREAT SERPL-MCNC: 1.15 MG/DL (ref 0.55–1.02)
DIFFERENTIAL METHOD BLD: ABNORMAL
EOSINOPHIL # BLD: 0.1 K/UL (ref 0–0.4)
EOSINOPHIL NFR BLD: 2 % (ref 0–7)
EPITH CASTS URNS QL MICRO: ABNORMAL /LPF
ERYTHROCYTE [DISTWIDTH] IN BLOOD BY AUTOMATED COUNT: 15.6 % (ref 11.5–14.5)
GLOBULIN SER CALC-MCNC: 4.2 G/DL (ref 2–4)
GLUCOSE SERPL-MCNC: 105 MG/DL (ref 65–100)
GLUCOSE UR STRIP.AUTO-MCNC: NEGATIVE MG/DL
HCT VFR BLD AUTO: 42.9 % (ref 35–47)
HGB BLD-MCNC: 13.9 G/DL (ref 11.5–16)
HGB UR QL STRIP: ABNORMAL
HYALINE CASTS URNS QL MICRO: ABNORMAL /LPF (ref 0–5)
IMM GRANULOCYTES # BLD AUTO: 0 K/UL (ref 0–0.04)
IMM GRANULOCYTES NFR BLD AUTO: 0 % (ref 0–0.5)
KETONES UR QL STRIP.AUTO: NEGATIVE MG/DL
LEUKOCYTE ESTERASE UR QL STRIP.AUTO: ABNORMAL
LYMPHOCYTES # BLD: 2.5 K/UL (ref 0.8–3.5)
LYMPHOCYTES NFR BLD: 40 % (ref 12–49)
MCH RBC QN AUTO: 28.1 PG (ref 26–34)
MCHC RBC AUTO-ENTMCNC: 32.4 G/DL (ref 30–36.5)
MCV RBC AUTO: 86.8 FL (ref 80–99)
MONOCYTES # BLD: 0.5 K/UL (ref 0–1)
MONOCYTES NFR BLD: 9 % (ref 5–13)
NEUTS SEG # BLD: 3 K/UL (ref 1.8–8)
NEUTS SEG NFR BLD: 48 % (ref 32–75)
NITRITE UR QL STRIP.AUTO: NEGATIVE
NRBC # BLD: 0 K/UL (ref 0–0.01)
NRBC BLD-RTO: 0 PER 100 WBC
PH UR STRIP: 6 [PH] (ref 5–8)
PLATELET # BLD AUTO: 360 K/UL (ref 150–400)
PMV BLD AUTO: 8.8 FL (ref 8.9–12.9)
POTASSIUM SERPL-SCNC: 3.9 MMOL/L (ref 3.5–5.1)
PROT SERPL-MCNC: 8.3 G/DL (ref 6.4–8.2)
PROT UR STRIP-MCNC: NEGATIVE MG/DL
RBC # BLD AUTO: 4.94 M/UL (ref 3.8–5.2)
RBC #/AREA URNS HPF: ABNORMAL /HPF (ref 0–5)
SODIUM SERPL-SCNC: 140 MMOL/L (ref 136–145)
SP GR UR REFRACTOMETRY: 1.01 (ref 1–1.03)
UA: UC IF INDICATED,UAUC: ABNORMAL
UROBILINOGEN UR QL STRIP.AUTO: 0.2 EU/DL (ref 0.2–1)
WBC # BLD AUTO: 6.2 K/UL (ref 3.6–11)
WBC URNS QL MICRO: ABNORMAL /HPF (ref 0–4)

## 2019-01-28 PROCEDURE — 80053 COMPREHEN METABOLIC PANEL: CPT

## 2019-01-28 PROCEDURE — 36415 COLL VENOUS BLD VENIPUNCTURE: CPT

## 2019-01-28 PROCEDURE — 96361 HYDRATE IV INFUSION ADD-ON: CPT

## 2019-01-28 PROCEDURE — 96375 TX/PRO/DX INJ NEW DRUG ADDON: CPT

## 2019-01-28 PROCEDURE — 74011250636 HC RX REV CODE- 250/636

## 2019-01-28 PROCEDURE — 74011250636 HC RX REV CODE- 250/636: Performed by: EMERGENCY MEDICINE

## 2019-01-28 PROCEDURE — 99283 EMERGENCY DEPT VISIT LOW MDM: CPT

## 2019-01-28 PROCEDURE — 96374 THER/PROPH/DIAG INJ IV PUSH: CPT

## 2019-01-28 PROCEDURE — 87086 URINE CULTURE/COLONY COUNT: CPT

## 2019-01-28 PROCEDURE — 85025 COMPLETE CBC W/AUTO DIFF WBC: CPT

## 2019-01-28 PROCEDURE — 81001 URINALYSIS AUTO W/SCOPE: CPT

## 2019-01-28 RX ORDER — ONDANSETRON 2 MG/ML
4 INJECTION INTRAMUSCULAR; INTRAVENOUS
Status: COMPLETED | OUTPATIENT
Start: 2019-01-28 | End: 2019-01-28

## 2019-01-28 RX ORDER — KETOROLAC TROMETHAMINE 30 MG/ML
INJECTION, SOLUTION INTRAMUSCULAR; INTRAVENOUS
Status: COMPLETED
Start: 2019-01-28 | End: 2019-01-28

## 2019-01-28 RX ORDER — KETOROLAC TROMETHAMINE 30 MG/ML
30 INJECTION, SOLUTION INTRAMUSCULAR; INTRAVENOUS
Status: COMPLETED | OUTPATIENT
Start: 2019-01-28 | End: 2019-01-28

## 2019-01-28 RX ORDER — KETOROLAC TROMETHAMINE 10 MG/1
10 TABLET, FILM COATED ORAL
Qty: 20 TAB | Refills: 0 | OUTPATIENT
Start: 2019-01-28 | End: 2020-08-16

## 2019-01-28 RX ADMIN — ONDANSETRON 4 MG: 2 INJECTION INTRAMUSCULAR; INTRAVENOUS at 02:26

## 2019-01-28 RX ADMIN — KETOROLAC TROMETHAMINE 30 MG: 30 INJECTION, SOLUTION INTRAMUSCULAR; INTRAVENOUS at 02:26

## 2019-01-28 RX ADMIN — SODIUM CHLORIDE 1000 ML: 900 INJECTION, SOLUTION INTRAVENOUS at 02:20

## 2019-01-28 NOTE — ED PROVIDER NOTES
20 y.o. female with past medical history significant for kidney stones, GERD, asthma, presents ambulatory to the ED accompanied by  with chief complaint of right flank pain. Patient states that she as recently diagnosed with a 5 mm right ureteral stone on Friday of last week, 1/25/19. She has continued to have flank pain since that diagnosis, and the pain became especially severe late last night/early this morning. Patient has not yet scheduled an appointment for follow-up with urology because the office was closed over the weekend. She attempted to call the [facility] prior to arrival for recommendations about her pain, but she states that no one answered the phone. Patient rates her current level of discomfort in the right flank as a 9/10 in severity. She reports radiation of the flank into the right side of the abdomen and right groin. Patient additionally complains of nausea associated with the pain. She denies vomiting or fevers. There are no other acute medical concerns at this time. Social hx: Positive Tobacco use (current every day smoker); Denies EtOH use; Denies Illicit Drug Abuse PCP: None Note written by Manjit Keith, as dictated by Emilia Kahn, DO 2:08 AM  
 
 
The history is provided by the patient. No  was used. Past Medical History:  
Diagnosis Date  ADHD (attention deficit hyperactivity disorder) 2008 Dr. Benny Mullen  Ankle fracture, left 4x  Asthma attack  Chronic kidney disease   
 kidney stones  Depression, major 8/2013 Dr. Benny Mullen  Food allergy 2010  
 beef (severe), tomoato (severe), others. Dr. Gerri Allen  GERD (gastroesophageal reflux disease)  IUD (intrauterine device) in place 7/2014 Dr. Denisse Ulloa  Nephrolithiasis   
 last 8/2012 s/p lithotripsy. calcium  Panic anxiety syndrome 8/2013 Dr. Benny Mullen  Pap smear for cervical cancer screening Dr Denisse Ulloa Past Surgical History:  
Procedure Laterality Date  HX TONSIL AND ADENOIDECTOMY  LITHOTRIPSY  3 total  
 
   
No family history on file. Social History Socioeconomic History  Marital status:  Spouse name: Not on file  Number of children: Not on file  Years of education: Not on file  Highest education level: Not on file Social Needs  Financial resource strain: Not on file  Food insecurity - worry: Not on file  Food insecurity - inability: Not on file  Transportation needs - medical: Not on file  Transportation needs - non-medical: Not on file Occupational History  Not on file Tobacco Use  Smoking status: Current Every Day Smoker Types: Cigarettes  Smokeless tobacco: Never Used Substance and Sexual Activity  Alcohol use: No  
 Drug use: No  
 Sexual activity: Yes Other Topics Concern  Not on file Social History Narrative  Not on file ALLERGIES: Bactrim [sulfamethoprim ds] and Griseofulvin Review of Systems Constitutional: Negative for appetite change, chills, fever and unexpected weight change. HENT: Negative for ear pain, hearing loss, rhinorrhea and trouble swallowing. Eyes: Negative for pain and visual disturbance. Respiratory: Negative for cough, chest tightness and shortness of breath. Cardiovascular: Negative for chest pain and palpitations. Gastrointestinal: Positive for abdominal pain and nausea. Negative for abdominal distention, blood in stool and vomiting. Genitourinary: Positive for flank pain. Negative for dysuria and urgency. Musculoskeletal: Negative for myalgias. Skin: Negative for rash. Neurological: Negative for dizziness, syncope, weakness and numbness. Psychiatric/Behavioral: Negative for confusion and suicidal ideas. All other systems reviewed and are negative. Vitals:  
 01/28/19 0158 BP: 148/90 Pulse: 84 Resp: 19 Temp: 99.2 °F (37.3 °C) SpO2: 99% Weight: 97.5 kg (215 lb) Height: 5' (1.524 m) Physical Exam  
Constitutional: She is oriented to person, place, and time. She appears well-developed and well-nourished. No distress. HENT:  
Head: Normocephalic and atraumatic. Right Ear: External ear normal.  
Left Ear: External ear normal.  
Nose: Nose normal.  
Mouth/Throat: Oropharynx is clear and moist. No oropharyngeal exudate. Eyes: Conjunctivae and EOM are normal. Pupils are equal, round, and reactive to light. Right eye exhibits no discharge. Left eye exhibits no discharge. No scleral icterus. Neck: Normal range of motion. Neck supple. No JVD present. No tracheal deviation present. Cardiovascular: Normal rate, regular rhythm, normal heart sounds and intact distal pulses. Exam reveals no gallop and no friction rub. No murmur heard. Pulmonary/Chest: Effort normal and breath sounds normal. No stridor. No respiratory distress. She has no decreased breath sounds. She has no wheezes. She has no rhonchi. She has no rales. She exhibits no tenderness. Abdominal: Soft. Bowel sounds are normal. She exhibits no distension. There is tenderness (minimal) in the right lower quadrant. There is no rebound and no guarding. Musculoskeletal: Normal range of motion. She exhibits no edema or tenderness. Neurological: She is alert and oriented to person, place, and time. She has normal strength and normal reflexes. She displays normal reflexes. No cranial nerve deficit or sensory deficit. She exhibits normal muscle tone. Coordination normal. GCS eye subscore is 4. GCS verbal subscore is 5. GCS motor subscore is 6. Skin: Skin is warm and dry. No rash noted. She is not diaphoretic. No erythema. No pallor. Psychiatric: She has a normal mood and affect. Her behavior is normal. Judgment and thought content normal.  
Nursing note and vitals reviewed. Note written by Luz Durbin.  Li Treadwell, as dictated by Radha Meza, DO 2:08 AM   
 
 MDM 
Number of Diagnoses or Management Options Right flank pain:  
Ureterolithiasis:  
  
Amount and/or Complexity of Data Reviewed Clinical lab tests: ordered and reviewed Review and summarize past medical records: yes Risk of Complications, Morbidity, and/or Mortality Presenting problems: moderate Diagnostic procedures: low Management options: moderate Patient Progress Patient progress: improved Procedures Chief Complaint Patient presents with  Flank Pain The patient's presenting problems have been discussed, and they are in agreement with the care plan formulated and outlined with them. I have encouraged them to ask questions as they arise throughout their visit. MEDICATIONS GIVEN: 
Medications  
sodium chloride 0.9 % bolus infusion 1,000 mL (1,000 mL IntraVENous New Bag 1/28/19 0220)  
ketorolac (TORADOL) injection 30 mg (30 mg IntraVENous Given 1/28/19 0226) ondansetron [facility]) injection 4 mg (4 mg IntraVENous Given 1/28/19 0226) LABS REVIEWED: 
Recent Results (from the past 24 hour(s)) CBC WITH AUTOMATED DIFF Collection Time: 01/28/19  2:20 AM  
Result Value Ref Range WBC 6.2 3.6 - 11.0 K/uL  
 RBC 4.94 3.80 - 5.20 M/uL  
 HGB 13.9 11.5 - 16.0 g/dL HCT 42.9 35.0 - 47.0 % MCV 86.8 80.0 - 99.0 FL  
 MCH 28.1 26.0 - 34.0 PG  
 MCHC 32.4 30.0 - 36.5 g/dL  
 RDW 15.6 (H) 11.5 - 14.5 % PLATELET 313 741 - 103 K/uL MPV 8.8 (L) 8.9 - 12.9 FL  
 NRBC 0.0 0  WBC ABSOLUTE NRBC 0.00 0.00 - 0.01 K/uL NEUTROPHILS 48 32 - 75 % LYMPHOCYTES 40 12 - 49 % MONOCYTES 9 5 - 13 % EOSINOPHILS 2 0 - 7 % BASOPHILS 1 0 - 1 % IMMATURE GRANULOCYTES 0 0.0 - 0.5 % ABS. NEUTROPHILS 3.0 1.8 - 8.0 K/UL  
 ABS. LYMPHOCYTES 2.5 0.8 - 3.5 K/UL  
 ABS. MONOCYTES 0.5 0.0 - 1.0 K/UL  
 ABS. EOSINOPHILS 0.1 0.0 - 0.4 K/UL  
 ABS. BASOPHILS 0.0 0.0 - 0.1 K/UL  
 ABS. IMM. GRANS. 0.0 0.00 - 0.04 K/UL  
 DF AUTOMATED METABOLIC PANEL, COMPREHENSIVE  
 Collection Time: 01/28/19  2:54 AM  
Result Value Ref Range Sodium 140 136 - 145 mmol/L Potassium 3.9 3.5 - 5.1 mmol/L Chloride 103 97 - 108 mmol/L  
 CO2 25 21 - 32 mmol/L Anion gap 12 5 - 15 mmol/L Glucose 105 (H) 65 - 100 mg/dL BUN 12 6 - 20 MG/DL Creatinine 1.15 (H) 0.55 - 1.02 MG/DL  
 BUN/Creatinine ratio 10 (L) 12 - 20 GFR est AA >60 >60 ml/min/1.73m2 GFR est non-AA 57 (L) >60 ml/min/1.73m2 Calcium 9.0 8.5 - 10.1 MG/DL Bilirubin, total 0.3 0.2 - 1.0 MG/DL  
 ALT (SGPT) 12 12 - 78 U/L  
 AST (SGOT) 12 (L) 15 - 37 U/L Alk. phosphatase 59 45 - 117 U/L Protein, total 8.3 (H) 6.4 - 8.2 g/dL Albumin 4.1 3.5 - 5.0 g/dL Globulin 4.2 (H) 2.0 - 4.0 g/dL A-G Ratio 1.0 (L) 1.1 - 2.2 URINALYSIS W/ REFLEX CULTURE Collection Time: 01/28/19  3:07 AM  
Result Value Ref Range Color YELLOW/STRAW Appearance CLEAR CLEAR Specific gravity 1.013 1.003 - 1.030    
 pH (UA) 6.0 5.0 - 8.0 Protein NEGATIVE  NEG mg/dL Glucose NEGATIVE  NEG mg/dL Ketone NEGATIVE  NEG mg/dL Bilirubin NEGATIVE  NEG Blood LARGE (A) NEG Urobilinogen 0.2 0.2 - 1.0 EU/dL Nitrites NEGATIVE  NEG Leukocyte Esterase TRACE (A) NEG    
 WBC 5-10 0 - 4 /hpf  
 RBC  0 - 5 /hpf Epithelial cells MODERATE (A) FEW /lpf Bacteria 1+ (A) NEG /hpf  
 UA:UC IF INDICATED URINE CULTURE ORDERED (A) CNI Hyaline cast 2-5 0 - 5 /lpf VITAL SIGNS: 
Patient Vitals for the past 12 hrs: 
 Temp Pulse Resp BP SpO2  
01/28/19 0158 99.2 °F (37.3 °C) 84 19 148/90 99 % RADIOLOGY RESULTS: 
The following have been ordered and reviewed: 
No results found. PROGRESS NOTES: 
Discussed results and plan with patient. Pt feeling better. Patient will be discharged home with urology follow up. Patient instructed to return to the emergency room for any worsening symptoms or any other concerns. DIAGNOSIS: 
 
1. Ureterolithiasis 2. Right flank pain PLAN: 
 Follow-up Information Follow up With Specialties Details Why Contact Info [facility]   [address] 
[place] [id number] [facility] EMERGENCY DEPT Emergency Medicine  If symptoms worsen [address] 
[phone] Current Discharge Medication List  
  
START taking these medications Details  
ketorolac (TORADOL) 10 mg tablet Take 1 Tab by mouth every six (6) hours as needed for Pain. Qty: 20 Tab, Refills: 0 CONTINUE these medications which have NOT CHANGED Details  
!! ondansetron (ZOFRAN ODT) 4 mg disintegrating tablet Take 1 Tab by mouth every eight (8) hours as needed for Nausea. Qty: 10 Tab, Refills: 0 HYDROcodone-acetaminophen (NORCO) 7.5-325 mg per tablet Take 1 Tab by mouth every six (6) hours as needed for Pain (take with food). Max Daily Amount: 4 Tabs. Qty: 15 Tab, Refills: 0 Associated Diagnoses: Kidney stone on right side  
  
cephALEXin (KEFLEX) 500 mg capsule Take 1 Cap by mouth three (3) times daily for 7 days. Qty: 21 Cap, Refills: 0  
  
!! ondansetron (ZOFRAN ODT) 8 mg disintegrating tablet Take 1 Tab by mouth every eight (8) hours as needed for Nausea. Qty: 12 Tab, Refills: 0  
  
albuterol (PROVENTIL HFA, VENTOLIN HFA, PROAIR HFA) 90 mcg/actuation inhaler Take 2 Puffs by inhalation every four (4) hours as needed for Wheezing. Qty: 1 Inhaler, Refills: 0  
  
inhalational spacing device (POCKET CHAMBER) 1 Each by Does Not Apply route as needed. Qty: 1 Device, Refills: 0  
  
oxyCODONE-acetaminophen (PERCOCET) 5-325 mg per tablet Take 1 Tab by mouth every six (6) hours as needed for Pain for up to 15 doses. Max Daily Amount: 4 Tabs. Qty: 15 Tab, Refills: 0 FLUoxetine (PROZAC) 20 mg capsule Take  by mouth daily. ALPRAZolam (XANAX) 0.5 mg tablet Take 0.5 mg by mouth.  
  
clonazePAM (KLONOPIN) 1 mg tablet Take 1 mg by mouth two (2) times a day. !! - Potential duplicate medications found. Please discuss with provider. ED COURSE: The patient's hospital course has been uncomplicated.

## 2019-01-28 NOTE — DISCHARGE INSTRUCTIONS
We hope that we have addressed all of your medical concerns. The examination and treatment you received in the Emergency Department were for an emergent problem and were not intended as complete care. It is important that you follow up with your healthcare provider(s) for ongoing care. If your symptoms worsen or do not improve as expected, and you are unable to reach your usual health care provider(s), you should return to the Emergency Department. Today's healthcare is undergoing tremendous change, and patient satisfaction surveys are one of the many tools to assess the quality of medical care. You may receive a survey from the [email or web address] regarding your experience in the Emergency Department. I hope that your experience has been completely positive, particularly the medical care that I provided. As such, please participate in the survey; anything less than excellent does not meet my expectations or intentions. [facility] and [facility] participate in nationally recognized quality of care measures. If your blood pressure is greater than 120/80, as reported below, we urge that you seek medical care to address the potential of high blood pressure, commonly known as hypertension. Hypertension can be hereditary or can be caused by certain medical conditions, pain, stress, or \"white coat syndrome. \"       Please make an appointment with your health care provider(s) for follow up of your Emergency Department visit. VITALS:   Patient Vitals for the past 8 hrs:   Temp Pulse Resp BP SpO2   01/28/19 0158 99.2 °F (37.3 °C) 84 19 148/90 99 %          Thank you for allowing us to provide you with medical care today. We realize that you have many choices for your emergency care needs. Please choose us in the future for any continued health care needs. Sully Zhao, [address] Office: [phone]            Recent Results (from the past 24 hour(s))   CBC WITH AUTOMATED DIFF    Collection Time: 01/28/19  2:20 AM   Result Value Ref Range    WBC 6.2 3.6 - 11.0 K/uL    RBC 4.94 3.80 - 5.20 M/uL    HGB 13.9 11.5 - 16.0 g/dL    HCT 42.9 35.0 - 47.0 %    MCV 86.8 80.0 - 99.0 FL    MCH 28.1 26.0 - 34.0 PG    MCHC 32.4 30.0 - 36.5 g/dL    RDW 15.6 (H) 11.5 - 14.5 %    PLATELET 342 941 - 532 K/uL    MPV 8.8 (L) 8.9 - 12.9 FL    NRBC 0.0 0  WBC    ABSOLUTE NRBC 0.00 0.00 - 0.01 K/uL    NEUTROPHILS 48 32 - 75 %    LYMPHOCYTES 40 12 - 49 %    MONOCYTES 9 5 - 13 %    EOSINOPHILS 2 0 - 7 %    BASOPHILS 1 0 - 1 %    IMMATURE GRANULOCYTES 0 0.0 - 0.5 %    ABS. NEUTROPHILS 3.0 1.8 - 8.0 K/UL    ABS. LYMPHOCYTES 2.5 0.8 - 3.5 K/UL    ABS. MONOCYTES 0.5 0.0 - 1.0 K/UL    ABS. EOSINOPHILS 0.1 0.0 - 0.4 K/UL    ABS. BASOPHILS 0.0 0.0 - 0.1 K/UL    ABS. IMM. GRANS. 0.0 0.00 - 0.04 K/UL    DF AUTOMATED     METABOLIC PANEL, COMPREHENSIVE    Collection Time: 01/28/19  2:54 AM   Result Value Ref Range    Sodium 140 136 - 145 mmol/L    Potassium 3.9 3.5 - 5.1 mmol/L    Chloride 103 97 - 108 mmol/L    CO2 25 21 - 32 mmol/L    Anion gap 12 5 - 15 mmol/L    Glucose 105 (H) 65 - 100 mg/dL    BUN 12 6 - 20 MG/DL    Creatinine 1.15 (H) 0.55 - 1.02 MG/DL    BUN/Creatinine ratio 10 (L) 12 - 20      GFR est AA >60 >60 ml/min/1.73m2    GFR est non-AA 57 (L) >60 ml/min/1.73m2    Calcium 9.0 8.5 - 10.1 MG/DL    Bilirubin, total 0.3 0.2 - 1.0 MG/DL    ALT (SGPT) 12 12 - 78 U/L    AST (SGOT) 12 (L) 15 - 37 U/L    Alk.  phosphatase 59 45 - 117 U/L    Protein, total 8.3 (H) 6.4 - 8.2 g/dL    Albumin 4.1 3.5 - 5.0 g/dL    Globulin 4.2 (H) 2.0 - 4.0 g/dL    A-G Ratio 1.0 (L) 1.1 - 2.2     URINALYSIS W/ REFLEX CULTURE    Collection Time: 01/28/19  3:07 AM   Result Value Ref Range    Color YELLOW/STRAW      Appearance CLEAR CLEAR      Specific gravity 1.013 1.003 - 1.030      pH (UA) 6.0 5.0 - 8.0      Protein NEGATIVE  NEG mg/dL    Glucose NEGATIVE  NEG mg/dL    Ketone NEGATIVE  NEG mg/dL    Bilirubin NEGATIVE  NEG      Blood LARGE (A) NEG      Urobilinogen 0.2 0.2 - 1.0 EU/dL    Nitrites NEGATIVE  NEG      Leukocyte Esterase TRACE (A) NEG      WBC 5-10 0 - 4 /hpf    RBC  0 - 5 /hpf    Epithelial cells MODERATE (A) FEW /lpf    Bacteria 1+ (A) NEG /hpf    UA:UC IF INDICATED URINE CULTURE ORDERED (A) CNI      Hyaline cast 2-5 0 - 5 /lpf       No results found. Patient Education        Kidney Stone: Care Instructions  Your Care Instructions    Kidney stones are formed when salts, minerals, and other substances normally found in the urine clump together. They can be as small as grains of sand or, rarely, as large as golf balls. While the stone is traveling through the ureter, which is the tube that carries urine from the kidney to the bladder, you will probably feel pain. The pain may be mild or very severe. You may also have some blood in your urine. As soon as the stone reaches the bladder, any intense pain should go away. If a stone is too large to pass on its own, you may need a medical procedure to help you pass the stone. The doctor has checked you carefully, but problems can develop later. If you notice any problems or new symptoms, get medical treatment right away. Follow-up care is a key part of your treatment and safety. Be sure to make and go to all appointments, and call your doctor if you are having problems. It's also a good idea to know your test results and keep a list of the medicines you take. How can you care for yourself at home? · Drink plenty of fluids, enough so that your urine is light yellow or clear like water. If you have kidney, heart, or liver disease and have to limit fluids, talk with your doctor before you increase the amount of fluids you drink. · Take pain medicines exactly as directed. Call your doctor if you think you are having a problem with your medicine.   ? If the doctor gave you a prescription medicine for pain, take it as prescribed. ? If you are not taking a prescription pain medicine, ask your doctor if you can take an over-the-counter medicine. Read and follow all instructions on the label. · Your doctor may ask you to strain your urine so that you can collect your kidney stone when it passes. You can use a kitchen strainer or a tea strainer to catch the stone. Store it in a plastic bag until you see your doctor again. Preventing future kidney stones  Some changes in your diet may help prevent kidney stones. Depending on the cause of your stones, your doctor may recommend that you:  · Drink plenty of fluids, enough so that your urine is light yellow or clear like water. If you have kidney, heart, or liver disease and have to limit fluids, talk with your doctor before you increase the amount of fluids you drink. · Limit coffee, tea, and alcohol. Also avoid grapefruit juice. · Do not take more than the recommended daily dose of vitamins C and D.  · Avoid antacids such as Gaviscon, Maalox, Mylanta, or Tums. · Limit the amount of salt (sodium) in your diet. · Eat a balanced diet that is not too high in protein. · Limit foods that are high in a substance called oxalate, which can cause kidney stones. These foods include dark green vegetables, rhubarb, chocolate, wheat bran, nuts, cranberries, and beans. When should you call for help? Call your doctor now or seek immediate medical care if:    · You cannot keep down fluids.     · Your pain gets worse.     · You have a fever or chills.     · You have new or worse pain in your back just below your rib cage (the flank area).     · You have new or more blood in your urine.    Watch closely for changes in your health, and be sure to contact your doctor if:    · You do not get better as expected. Where can you learn more? Go to http://victoria-cristiano.info/.   Enter N243 in the search box to learn more about \"Kidney Stone: Care Instructions. \"  Current as of: March 14, 2018  Content Version: 11.9  © 2874-2069 [employer]. Care instructions adapted under license by [employer] (which disclaims liability or warranty for this information). If you have questions about a medical condition or this instruction, always ask your healthcare professional. [address] any warranty or liability for your use of this information. Patient Education        Learning About Diet for Kidney Stone Prevention  What are kidney stones? Kidney stones are made of salts and minerals in the urine that form small \"sara. \" Stones can form in the kidneys and the ureters (the tubes that lead from the kidneys to the bladder). They can also form in the bladder. Stones may not cause a problem as long as they stay in the kidneys. But they can cause sudden, severe pain. Pain is most likely when the stones travel from the kidneys to the bladder. Kidney stones can cause bloody urine. Kidney stones often run in families. You are more likely to get them if you don't drink enough fluids, mainly water. Certain foods and drinks and some dietary supplements may also increase your risk for kidney stones if you consume too much of them. What can you do to prevent kidney stones? Changing what you eat may not prevent all types of kidney stones. But for people who have a history of certain kinds of kidney stones, some changes in diet may help. A dietitian can help you set up a meal plan that includes healthy, low-oxalate choices. Here are some general guidelines to get you started. Plan your meals and snacks around foods that are low in oxalate. These foods include:  · Corn, kale, parsnips, and squash,. · Beef, chicken, pork, turkey, and fish. · Milk, butter, cheese, and yogurt. You can eat certain foods that are medium-high in oxalate, but eat them only once in a while. These foods include:  · Bread.   · Jessie Suleman rice.  · English muffins. · Figs. · Popcorn. · String beans. · Tomatoes. Limit very high-oxalate foods, including:  · Black tea. · Coffee. · Chocolate. · Dark green vegetables. · Nuts. Here are some other things you can do to help prevent kidney stones. · Drink plenty of fluids. If you have kidney, heart, or liver disease and have to limit fluids, talk with your doctor before you increase the amount of fluids you drink. · Do not take more than the recommended daily dose of vitamins C and D.  · Limit the salt in your diet. · Eat a balanced diet that is not too high in protein. Follow-up care is a key part of your treatment and safety. Be sure to make and go to all appointments, and call your doctor if you are having problems. It's also a good idea to know your test results and keep a list of the medicines you take. Where can you learn more? Go to http://victoria-cristiano.info/. Enter C138 in the search box to learn more about \"Learning About Diet for Kidney Stone Prevention. \"  Current as of: March 28, 2018  Content Version: 11.9  © 4020-3399 [employer]. Care instructions adapted under license by [employer] (which disclaims liability or warranty for this information). If you have questions about a medical condition or this instruction, always ask your healthcare professional. [address] any warranty or liability for your use of this information.

## 2019-01-28 NOTE — ED TRIAGE NOTES
Pt. States was seen here and dx with kidney stone on Friday, states started with increased pain. Took hydrocodone tonight with no relief.

## 2019-01-29 LAB
BACTERIA SPEC CULT: NORMAL
CC UR VC: NORMAL
SERVICE CMNT-IMP: NORMAL

## 2019-09-14 ENCOUNTER — HOSPITAL ENCOUNTER (EMERGENCY)
Age: 24
Discharge: HOME | End: 2019-09-14
Attending: EMERGENCY MEDICINE
Payer: COMMERCIAL

## 2019-09-14 ENCOUNTER — APPOINTMENT (OUTPATIENT)
Dept: CT IMAGING | Age: 24
End: 2019-09-14
Attending: EMERGENCY MEDICINE
Payer: COMMERCIAL

## 2019-09-14 VITALS
RESPIRATION RATE: 18 BRPM | TEMPERATURE: 98.8 F | HEIGHT: 65 IN | BODY MASS INDEX: 36.65 KG/M2 | SYSTOLIC BLOOD PRESSURE: 135 MMHG | DIASTOLIC BLOOD PRESSURE: 93 MMHG | HEART RATE: 98 BPM | WEIGHT: 220 LBS | OXYGEN SATURATION: 99 %

## 2019-09-14 DIAGNOSIS — R10.9 LEFT FLANK PAIN: Primary | ICD-10-CM

## 2019-09-14 LAB
ALBUMIN SERPL-MCNC: 4.1 G/DL (ref 3.5–5)
ALBUMIN/GLOB SERPL: 1.1 {RATIO} (ref 1.1–2.2)
ALP SERPL-CCNC: 63 U/L (ref 45–117)
ALT SERPL-CCNC: 18 U/L (ref 12–78)
ANION GAP SERPL CALC-SCNC: 7 MMOL/L (ref 5–15)
APPEARANCE UR: CLEAR
AST SERPL-CCNC: 10 U/L (ref 15–37)
BACTERIA URNS QL MICRO: NEGATIVE /HPF
BASOPHILS # BLD: 0 K/UL (ref 0–0.1)
BASOPHILS NFR BLD: 1 % (ref 0–1)
BILIRUB SERPL-MCNC: 0.3 MG/DL (ref 0.2–1)
BILIRUB UR QL: NEGATIVE
BUN SERPL-MCNC: 9 MG/DL (ref 6–20)
BUN/CREAT SERPL: 9 (ref 12–20)
CALCIUM SERPL-MCNC: 8.6 MG/DL (ref 8.5–10.1)
CHLORIDE SERPL-SCNC: 104 MMOL/L (ref 97–108)
CO2 SERPL-SCNC: 29 MMOL/L (ref 21–32)
COLOR UR: NORMAL
COMMENT, HOLDF: NORMAL
CREAT SERPL-MCNC: 1.02 MG/DL (ref 0.55–1.02)
DIFFERENTIAL METHOD BLD: ABNORMAL
EOSINOPHIL # BLD: 0.1 K/UL (ref 0–0.4)
EOSINOPHIL NFR BLD: 1 % (ref 0–7)
EPITH CASTS URNS QL MICRO: NORMAL /LPF
ERYTHROCYTE [DISTWIDTH] IN BLOOD BY AUTOMATED COUNT: 13.6 % (ref 11.5–14.5)
GLOBULIN SER CALC-MCNC: 3.9 G/DL (ref 2–4)
GLUCOSE SERPL-MCNC: 108 MG/DL (ref 65–100)
GLUCOSE UR STRIP.AUTO-MCNC: NEGATIVE MG/DL
HCG UR QL: NEGATIVE
HCT VFR BLD AUTO: 41.7 % (ref 35–47)
HGB BLD-MCNC: 13.8 G/DL (ref 11.5–16)
HGB UR QL STRIP: NEGATIVE
HYALINE CASTS URNS QL MICRO: NORMAL /LPF (ref 0–5)
IMM GRANULOCYTES # BLD AUTO: 0 K/UL (ref 0–0.04)
IMM GRANULOCYTES NFR BLD AUTO: 0 % (ref 0–0.5)
KETONES UR QL STRIP.AUTO: NEGATIVE MG/DL
LEUKOCYTE ESTERASE UR QL STRIP.AUTO: NEGATIVE
LYMPHOCYTES # BLD: 2.8 K/UL (ref 0.8–3.5)
LYMPHOCYTES NFR BLD: 46 % (ref 12–49)
MCH RBC QN AUTO: 28.8 PG (ref 26–34)
MCHC RBC AUTO-ENTMCNC: 33.1 G/DL (ref 30–36.5)
MCV RBC AUTO: 87.1 FL (ref 80–99)
MONOCYTES # BLD: 0.4 K/UL (ref 0–1)
MONOCYTES NFR BLD: 6 % (ref 5–13)
NEUTS SEG # BLD: 2.8 K/UL (ref 1.8–8)
NEUTS SEG NFR BLD: 46 % (ref 32–75)
NITRITE UR QL STRIP.AUTO: NEGATIVE
NRBC # BLD: 0 K/UL (ref 0–0.01)
NRBC BLD-RTO: 0 PER 100 WBC
PH UR STRIP: 5.5 [PH] (ref 5–8)
PLATELET # BLD AUTO: 427 K/UL (ref 150–400)
PMV BLD AUTO: 8.9 FL (ref 8.9–12.9)
POTASSIUM SERPL-SCNC: 3.4 MMOL/L (ref 3.5–5.1)
PROT SERPL-MCNC: 8 G/DL (ref 6.4–8.2)
PROT UR STRIP-MCNC: NEGATIVE MG/DL
RBC # BLD AUTO: 4.79 M/UL (ref 3.8–5.2)
RBC #/AREA URNS HPF: NORMAL /HPF (ref 0–5)
SAMPLES BEING HELD,HOLD: NORMAL
SODIUM SERPL-SCNC: 140 MMOL/L (ref 136–145)
SP GR UR REFRACTOMETRY: 1.02 (ref 1–1.03)
UR CULT HOLD, URHOLD: NORMAL
UROBILINOGEN UR QL STRIP.AUTO: 0.2 EU/DL (ref 0.2–1)
WBC # BLD AUTO: 6 K/UL (ref 3.6–11)
WBC URNS QL MICRO: NORMAL /HPF (ref 0–4)

## 2019-09-14 PROCEDURE — 81025 URINE PREGNANCY TEST: CPT

## 2019-09-14 PROCEDURE — 85025 COMPLETE CBC W/AUTO DIFF WBC: CPT

## 2019-09-14 PROCEDURE — 74011250636 HC RX REV CODE- 250/636: Performed by: EMERGENCY MEDICINE

## 2019-09-14 PROCEDURE — 81001 URINALYSIS AUTO W/SCOPE: CPT

## 2019-09-14 PROCEDURE — 96375 TX/PRO/DX INJ NEW DRUG ADDON: CPT

## 2019-09-14 PROCEDURE — 74176 CT ABD & PELVIS W/O CONTRAST: CPT

## 2019-09-14 PROCEDURE — 80053 COMPREHEN METABOLIC PANEL: CPT

## 2019-09-14 PROCEDURE — 99283 EMERGENCY DEPT VISIT LOW MDM: CPT

## 2019-09-14 PROCEDURE — 96374 THER/PROPH/DIAG INJ IV PUSH: CPT

## 2019-09-14 PROCEDURE — 36415 COLL VENOUS BLD VENIPUNCTURE: CPT

## 2019-09-14 RX ORDER — KETOROLAC TROMETHAMINE 30 MG/ML
15 INJECTION, SOLUTION INTRAMUSCULAR; INTRAVENOUS
Status: COMPLETED | OUTPATIENT
Start: 2019-09-14 | End: 2019-09-14

## 2019-09-14 RX ORDER — ONDANSETRON 2 MG/ML
4 INJECTION INTRAMUSCULAR; INTRAVENOUS
Status: COMPLETED | OUTPATIENT
Start: 2019-09-14 | End: 2019-09-14

## 2019-09-14 RX ADMIN — SODIUM CHLORIDE 1000 ML: 900 INJECTION, SOLUTION INTRAVENOUS at 20:02

## 2019-09-14 RX ADMIN — ONDANSETRON 4 MG: 2 INJECTION INTRAMUSCULAR; INTRAVENOUS at 20:01

## 2019-09-14 RX ADMIN — KETOROLAC TROMETHAMINE 15 MG: 30 INJECTION, SOLUTION INTRAMUSCULAR at 20:02

## 2019-09-14 NOTE — ED PROVIDER NOTES
HPI   21 yo F presents with left flank pain radiating to left lower abdomen onset 2 days ago. +nausea. Denies fever, chills, dysuria, hematuria, vaginal bleeding/discharge. Denies chance of pregnancy, hx of PCOS. Hx of similar symptoms with kidney stones. Pain 5/10. No relieving or modifying factors. C/o urinary frequency. No other complaints. Past Medical History:   Diagnosis Date    ADHD (attention deficit hyperactivity disorder) 2008    Dr. Russell Santoyo Ankle fracture, left     4x    Asthma attack     Chronic kidney disease     kidney stones    Depression, major 8/2013    Dr. Elaine Marrero allergy 2010    beef (severe), tomoato (severe), others. Dr. Wendy Alvarez GERD (gastroesophageal reflux disease)     IUD (intrauterine device) in place 7/2014    Dr. Colletta Kirks    Nephrolithiasis     last 8/2012 s/p lithotripsy. calcium    Panic anxiety syndrome 8/2013    Dr. Russell Santoyo Pap smear for cervical cancer screening     Dr Colletta Kirks       Past Surgical History:   Procedure Laterality Date    HX TONSIL AND ADENOIDECTOMY      LITHOTRIPSY  3 total         No family history on file.     Social History     Socioeconomic History    Marital status:      Spouse name: Not on file    Number of children: Not on file    Years of education: Not on file    Highest education level: Not on file   Occupational History    Not on file   Social Needs    Financial resource strain: Not on file    Food insecurity:     Worry: Not on file     Inability: Not on file    Transportation needs:     Medical: Not on file     Non-medical: Not on file   Tobacco Use    Smoking status: Current Every Day Smoker     Types: Cigarettes    Smokeless tobacco: Never Used   Substance and Sexual Activity    Alcohol use: No    Drug use: No    Sexual activity: Yes   Lifestyle    Physical activity:     Days per week: Not on file     Minutes per session: Not on file    Stress: Not on file   Relationships    Social connections:     Talks on phone: Not on file     Gets together: Not on file     Attends Yazdanism service: Not on file     Active member of club or organization: Not on file     Attends meetings of clubs or organizations: Not on file     Relationship status: Not on file    Intimate partner violence:     Fear of current or ex partner: Not on file     Emotionally abused: Not on file     Physically abused: Not on file     Forced sexual activity: Not on file   Other Topics Concern    Not on file   Social History Narrative    Not on file         ALLERGIES: Bactrim [sulfamethoprim ds] and Griseofulvin    Review of Systems   Constitutional: Negative for chills and fever. Respiratory: Negative for cough and shortness of breath. Cardiovascular: Negative for chest pain. Gastrointestinal: Positive for nausea. Negative for abdominal pain, constipation, diarrhea and vomiting. Genitourinary: Positive for flank pain and frequency. Negative for difficulty urinating, dysuria and hematuria. Musculoskeletal: Positive for back pain. Skin: Negative for rash. All other systems reviewed and are negative.       Vitals:    09/14/19 1853   BP: (!) 135/93   Pulse: 98   Resp: 18   Temp: 98.8 °F (37.1 °C)   SpO2: 99%   Weight: 99.8 kg (220 lb)   Height: 5' 5\" (1.651 m)            Physical Exam   Physical Examination: General appearance - alert, well appearing, and in no distress, oriented to person, place, and time and normal appearing weight  Eyes - pupils equal and reactive, extraocular eye movements intact  Neck - supple, no significant adenopathy  Chest - clear to auscultation, no wheezes, rales or rhonchi, symmetric air entry  Heart - normal rate, regular rhythm, normal S1, S2, no murmurs, rubs, clicks or gallops  Abdomen - soft, nontender, nondistended, no masses or organomegaly  Back exam - full range of motion, no tenderness, palpable spasm or pain on motion, mild left CVAT  Neurological - alert, oriented, normal speech, no focal findings or movement disorder noted  Musculoskeletal - no joint tenderness, deformity or swelling  Extremities - peripheral pulses normal, no pedal edema, no clubbing or cyanosis  Skin - normal coloration and turgor, no rashes, no suspicious skin lesions noted  MDM  Number of Diagnoses or Management Options     Amount and/or Complexity of Data Reviewed  Clinical lab tests: ordered and reviewed  Tests in the radiology section of CPT®: ordered and reviewed  Decide to obtain previous medical records or to obtain history from someone other than the patient: yes  Obtain history from someone other than the patient: yes (spouse)  Review and summarize past medical records: yes  Independent visualization of images, tracings, or specimens: yes    Patient Progress  Patient progress: improved         Procedures

## 2019-09-14 NOTE — DISCHARGE INSTRUCTIONS
Patient Education        Abdominal Pain: Care Instructions  Your Care Instructions    Abdominal pain has many possible causes. Some aren't serious and get better on their own in a few days. Others need more testing and treatment. If your pain continues or gets worse, you need to be rechecked and may need more tests to find out what is wrong. You may need surgery to correct the problem. Don't ignore new symptoms, such as fever, nausea and vomiting, urination problems, pain that gets worse, and dizziness. These may be signs of a more serious problem. Your doctor may have recommended a follow-up visit in the next 8 to 12 hours. If you are not getting better, you may need more tests or treatment. The doctor has checked you carefully, but problems can develop later. If you notice any problems or new symptoms, get medical treatment right away. Follow-up care is a key part of your treatment and safety. Be sure to make and go to all appointments, and call your doctor if you are having problems. It's also a good idea to know your test results and keep a list of the medicines you take. How can you care for yourself at home? · Rest until you feel better. · To prevent dehydration, drink plenty of fluids, enough so that your urine is light yellow or clear like water. Choose water and other caffeine-free clear liquids until you feel better. If you have kidney, heart, or liver disease and have to limit fluids, talk with your doctor before you increase the amount of fluids you drink. · If your stomach is upset, eat mild foods, such as rice, dry toast or crackers, bananas, and applesauce. Try eating several small meals instead of two or three large ones. · Wait until 48 hours after all symptoms have gone away before you have spicy foods, alcohol, and drinks that contain caffeine. · Do not eat foods that are high in fat. · Avoid anti-inflammatory medicines such as aspirin, ibuprofen (Advil, Motrin), and naproxen (Aleve). These can cause stomach upset. Talk to your doctor if you take daily aspirin for another health problem. When should you call for help? Call 911 anytime you think you may need emergency care. For example, call if:    · You passed out (lost consciousness).     · You pass maroon or very bloody stools.     · You vomit blood or what looks like coffee grounds.     · You have new, severe belly pain.    Call your doctor now or seek immediate medical care if:    · Your pain gets worse, especially if it becomes focused in one area of your belly.     · You have a new or higher fever.     · Your stools are black and look like tar, or they have streaks of blood.     · You have unexpected vaginal bleeding.     · You have symptoms of a urinary tract infection. These may include:  ? Pain when you urinate. ? Urinating more often than usual.  ? Blood in your urine.     · You are dizzy or lightheaded, or you feel like you may faint.    Watch closely for changes in your health, and be sure to contact your doctor if:    · You are not getting better after 1 day (24 hours). Where can you learn more? Go to http://victoria-cristiano.info/. Enter L087 in the search box to learn more about \"Abdominal Pain: Care Instructions. \"  Current as of: September 23, 2018  Content Version: 12.1  © 3805-4158 [employer]. Care instructions adapted under license by [employer] (which disclaims liability or warranty for this information). If you have questions about a medical condition or this instruction, always ask your healthcare professional. [place] [address] any warranty or liability for your use of this information. We hope that we have addressed all of your medical concerns. The examination and treatment you received in the Emergency Department were for an emergent problem and were not intended as complete care.  It is important that you follow up with your healthcare provider(s) for ongoing care. If your symptoms worsen or do not improve as expected, and you are unable to reach your usual health care provider(s), you should return to the Emergency Department. Today's healthcare is undergoing tremendous change, and patient satisfaction surveys are one of the many tools to assess the quality of medical care. You may receive a survey from the [employer] regarding your experience in the Emergency Department. I hope that your experience has been completely positive, particularly the medical care that I provided. As such, please participate in the survey; anything less than excellent does not meet my expectations or intentions. [facility] and [address] [place] [address] participate in nationally recognized quality of care measures. If your blood pressure is greater than 120/80, as reported below, we urge that you seek medical care to address the potential of high blood pressure, commonly known as hypertension. Hypertension can be hereditary or can be caused by certain medical conditions, pain, stress, or \"white coat syndrome. \"       Please make an appointment with your health care provider(s) for follow up of your Emergency Department visit. VITALS:   Patient Vitals for the past 8 hrs:   Temp Pulse Resp BP SpO2   09/14/19 1853 98.8 °F (37.1 °C) 98 18 (!) 135/93 99 %          Thank you for allowing us to provide you with medical care today. We realize that you have many choices for your emergency care needs. Please choose us in the future for any continued health care needs. Brenda Meraz, [address] [place] [address].   Office: [phone]            Recent Results (from the past 24 hour(s))   URINALYSIS W/MICROSCOPIC    Collection Time: 09/14/19  7:26 PM   Result Value Ref Range    Color YELLOW/STRAW      Appearance CLEAR CLEAR      Specific gravity 1.020 1.003 - 1.030      pH (UA) 5.5 5.0 - 8.0      Protein NEGATIVE  NEG mg/dL    Glucose NEGATIVE  NEG mg/dL    Ketone NEGATIVE  NEG mg/dL    Bilirubin NEGATIVE  NEG      Blood NEGATIVE  NEG      Urobilinogen 0.2 0.2 - 1.0 EU/dL    Nitrites NEGATIVE  NEG      Leukocyte Esterase NEGATIVE  NEG      WBC 0-4 0 - 4 /hpf    RBC 0-5 0 - 5 /hpf    Epithelial cells FEW FEW /lpf    Bacteria NEGATIVE  NEG /hpf    Hyaline cast 0-2 0 - 5 /lpf   URINE CULTURE HOLD SAMPLE    Collection Time: 09/14/19  7:26 PM   Result Value Ref Range    Urine culture hold        URINE ON HOLD IN MICROBIOLOGY DEPT FOR 3 DAYS. IF UNPRESERVED URINE IS SUBMITTED, IT CANNOT BE USED FOR ADDITIONAL TESTING AFTER 24 HRS, RECOLLECTION WILL BE REQUIRED. SAMPLES BEING HELD    Collection Time: 09/14/19  7:26 PM   Result Value Ref Range    SAMPLES BEING HELD SST.RED.RADHA     COMMENT        Add-on orders for these samples will be processed based on acceptable specimen integrity and analyte stability, which may vary by analyte. CBC WITH AUTOMATED DIFF    Collection Time: 09/14/19  7:26 PM   Result Value Ref Range    WBC 6.0 3.6 - 11.0 K/uL    RBC 4.79 3.80 - 5.20 M/uL    HGB 13.8 11.5 - 16.0 g/dL    HCT 41.7 35.0 - 47.0 %    MCV 87.1 80.0 - 99.0 FL    MCH 28.8 26.0 - 34.0 PG    MCHC 33.1 30.0 - 36.5 g/dL    RDW 13.6 11.5 - 14.5 %    PLATELET 753 (H) 208 - 400 K/uL    MPV 8.9 8.9 - 12.9 FL    NRBC 0.0 0  WBC    ABSOLUTE NRBC 0.00 0.00 - 0.01 K/uL    NEUTROPHILS 46 32 - 75 %    LYMPHOCYTES 46 12 - 49 %    MONOCYTES 6 5 - 13 %    EOSINOPHILS 1 0 - 7 %    BASOPHILS 1 0 - 1 %    IMMATURE GRANULOCYTES 0 0.0 - 0.5 %    ABS. NEUTROPHILS 2.8 1.8 - 8.0 K/UL    ABS. LYMPHOCYTES 2.8 0.8 - 3.5 K/UL    ABS. MONOCYTES 0.4 0.0 - 1.0 K/UL    ABS. EOSINOPHILS 0.1 0.0 - 0.4 K/UL    ABS. BASOPHILS 0.0 0.0 - 0.1 K/UL    ABS. IMM.  GRANS. 0.0 0.00 - 0.04 K/UL    DF AUTOMATED     HCG URINE, QL. - POC    Collection Time: 09/14/19  7:32 PM   Result Value Ref Range    Pregnancy test,urine (POC) NEGATIVE  NEG Ct Abd Pelv Wo Cont    Result Date: 9/14/2019  EXAM: CT ABD PELV WO CONT INDICATION: left flank pain COMPARISON: 1/25/2019 CONTRAST:  None. TECHNIQUE: Thin axial images were obtained through the abdomen and pelvis. Coronal and sagittal reconstructions were generated. Oral contrast was not administered. CT dose reduction was achieved through use of a standardized protocol tailored for this examination and automatic exposure control for dose modulation. The absence of intravenous contrast material reduces the sensitivity for evaluation of the solid parenchymal organs of the abdomen. FINDINGS: LUNG BASES: Clear. INCIDENTALLY IMAGED HEART AND MEDIASTINUM: Unremarkable. LIVER: No mass or biliary dilatation. GALLBLADDER: Unremarkable. SPLEEN: No mass. PANCREAS: No mass or ductal dilatation. ADRENALS: Unremarkable. KIDNEYS/URETERS: Bilateral nonobstructing renal stones are noted, the largest of which measures 4 mm. No ureteral stone or hydronephrosis. STOMACH: Unremarkable. SMALL BOWEL: No dilatation or wall thickening. COLON: No dilatation or wall thickening. APPENDIX: Unremarkable. PERITONEUM: No ascites or pneumoperitoneum. RETROPERITONEUM: No lymphadenopathy or aortic aneurysm. REPRODUCTIVE ORGANS: There is no pelvic mass or lymphadenopathy. URINARY BLADDER: No mass or calculus. BONES: No destructive bone lesion. ADDITIONAL COMMENTS: N/A     IMPRESSION: Bilateral nonobstructing renal stones. No ureteral stone or hydronephrosis. No acute abnormality.

## 2020-08-16 ENCOUNTER — HOSPITAL ENCOUNTER (EMERGENCY)
Age: 25
Discharge: HOME | End: 2020-08-16
Attending: EMERGENCY MEDICINE | Admitting: EMERGENCY MEDICINE
Payer: COMMERCIAL

## 2020-08-16 ENCOUNTER — APPOINTMENT (OUTPATIENT)
Dept: ULTRASOUND IMAGING | Age: 25
End: 2020-08-16
Attending: NURSE PRACTITIONER
Payer: COMMERCIAL

## 2020-08-16 VITALS
DIASTOLIC BLOOD PRESSURE: 86 MMHG | SYSTOLIC BLOOD PRESSURE: 137 MMHG | HEART RATE: 98 BPM | RESPIRATION RATE: 16 BRPM | TEMPERATURE: 100.1 F | BODY MASS INDEX: 41.23 KG/M2 | WEIGHT: 210 LBS | OXYGEN SATURATION: 96 % | HEIGHT: 60 IN

## 2020-08-16 DIAGNOSIS — N83.202 CYSTS OF BOTH OVARIES: Primary | ICD-10-CM

## 2020-08-16 DIAGNOSIS — N83.201 CYSTS OF BOTH OVARIES: Primary | ICD-10-CM

## 2020-08-16 LAB
ALBUMIN SERPL-MCNC: 4.1 G/DL (ref 3.5–5)
ALBUMIN/GLOB SERPL: 0.9 {RATIO} (ref 1.1–2.2)
ALP SERPL-CCNC: 70 U/L (ref 45–117)
ALT SERPL-CCNC: 15 U/L (ref 12–78)
ANION GAP SERPL CALC-SCNC: 10 MMOL/L (ref 5–15)
APPEARANCE UR: ABNORMAL
AST SERPL-CCNC: 12 U/L (ref 15–37)
BACTERIA URNS QL MICRO: NEGATIVE /HPF
BASOPHILS # BLD: 0 K/UL (ref 0–0.1)
BASOPHILS NFR BLD: 0 % (ref 0–1)
BILIRUB SERPL-MCNC: 0.5 MG/DL (ref 0.2–1)
BILIRUB UR QL CFM: NEGATIVE
BUN SERPL-MCNC: 6 MG/DL (ref 6–20)
BUN/CREAT SERPL: 7 (ref 12–20)
CALCIUM SERPL-MCNC: 8.9 MG/DL (ref 8.5–10.1)
CHLORIDE SERPL-SCNC: 106 MMOL/L (ref 97–108)
CO2 SERPL-SCNC: 20 MMOL/L (ref 21–32)
COLOR UR: ABNORMAL
COMMENT, HOLDF: NORMAL
CREAT SERPL-MCNC: 0.86 MG/DL (ref 0.55–1.02)
DIFFERENTIAL METHOD BLD: ABNORMAL
EOSINOPHIL # BLD: 0 K/UL (ref 0–0.4)
EOSINOPHIL NFR BLD: 0 % (ref 0–7)
EPITH CASTS URNS QL MICRO: ABNORMAL /LPF
ERYTHROCYTE [DISTWIDTH] IN BLOOD BY AUTOMATED COUNT: 15.9 % (ref 11.5–14.5)
GLOBULIN SER CALC-MCNC: 4.6 G/DL (ref 2–4)
GLUCOSE SERPL-MCNC: 84 MG/DL (ref 65–100)
GLUCOSE UR STRIP.AUTO-MCNC: NEGATIVE MG/DL
HCG SERPL-ACNC: 353 MIU/ML (ref 0–6)
HCG UR QL: POSITIVE
HCT VFR BLD AUTO: 43.5 % (ref 35–47)
HGB BLD-MCNC: 14.1 G/DL (ref 11.5–16)
HGB UR QL STRIP: ABNORMAL
IMM GRANULOCYTES # BLD AUTO: 0 K/UL (ref 0–0.04)
IMM GRANULOCYTES NFR BLD AUTO: 0 % (ref 0–0.5)
KETONES UR QL STRIP.AUTO: 80 MG/DL
LEUKOCYTE ESTERASE UR QL STRIP.AUTO: ABNORMAL
LIPASE SERPL-CCNC: 86 U/L (ref 73–393)
LYMPHOCYTES # BLD: 2.1 K/UL (ref 0.8–3.5)
LYMPHOCYTES NFR BLD: 29 % (ref 12–49)
MCH RBC QN AUTO: 27.3 PG (ref 26–34)
MCHC RBC AUTO-ENTMCNC: 32.4 G/DL (ref 30–36.5)
MCV RBC AUTO: 84.1 FL (ref 80–99)
MONOCYTES # BLD: 0.5 K/UL (ref 0–1)
MONOCYTES NFR BLD: 7 % (ref 5–13)
MUCOUS THREADS URNS QL MICRO: ABNORMAL /LPF
NEUTS SEG # BLD: 4.8 K/UL (ref 1.8–8)
NEUTS SEG NFR BLD: 64 % (ref 32–75)
NITRITE UR QL STRIP.AUTO: NEGATIVE
NRBC # BLD: 0 K/UL (ref 0–0.01)
NRBC BLD-RTO: 0 PER 100 WBC
PH UR STRIP: 6 [PH] (ref 5–8)
PLATELET # BLD AUTO: 461 K/UL (ref 150–400)
PMV BLD AUTO: 8.9 FL (ref 8.9–12.9)
POTASSIUM SERPL-SCNC: 3.3 MMOL/L (ref 3.5–5.1)
PROT SERPL-MCNC: 8.7 G/DL (ref 6.4–8.2)
PROT UR STRIP-MCNC: 100 MG/DL
RBC # BLD AUTO: 5.17 M/UL (ref 3.8–5.2)
RBC #/AREA URNS HPF: >100 /HPF (ref 0–5)
SAMPLES BEING HELD,HOLD: NORMAL
SODIUM SERPL-SCNC: 136 MMOL/L (ref 136–145)
SP GR UR REFRACTOMETRY: 1.03 (ref 1–1.03)
UR CULT HOLD, URHOLD: NORMAL
UROBILINOGEN UR QL STRIP.AUTO: 0.2 EU/DL (ref 0.2–1)
WBC # BLD AUTO: 7.5 K/UL (ref 3.6–11)
WBC URNS QL MICRO: ABNORMAL /HPF (ref 0–4)

## 2020-08-16 PROCEDURE — 74011250636 HC RX REV CODE- 250/636: Performed by: NURSE PRACTITIONER

## 2020-08-16 PROCEDURE — 74011250637 HC RX REV CODE- 250/637: Performed by: EMERGENCY MEDICINE

## 2020-08-16 PROCEDURE — 99284 EMERGENCY DEPT VISIT MOD MDM: CPT

## 2020-08-16 PROCEDURE — 76801 OB US < 14 WKS SINGLE FETUS: CPT

## 2020-08-16 PROCEDURE — 96376 TX/PRO/DX INJ SAME DRUG ADON: CPT

## 2020-08-16 PROCEDURE — 76817 TRANSVAGINAL US OBSTETRIC: CPT

## 2020-08-16 PROCEDURE — 80053 COMPREHEN METABOLIC PANEL: CPT

## 2020-08-16 PROCEDURE — 83690 ASSAY OF LIPASE: CPT

## 2020-08-16 PROCEDURE — 81025 URINE PREGNANCY TEST: CPT

## 2020-08-16 PROCEDURE — 84702 CHORIONIC GONADOTROPIN TEST: CPT

## 2020-08-16 PROCEDURE — 81001 URINALYSIS AUTO W/SCOPE: CPT

## 2020-08-16 PROCEDURE — 74011250636 HC RX REV CODE- 250/636: Performed by: EMERGENCY MEDICINE

## 2020-08-16 PROCEDURE — 96375 TX/PRO/DX INJ NEW DRUG ADDON: CPT

## 2020-08-16 PROCEDURE — 85025 COMPLETE CBC W/AUTO DIFF WBC: CPT

## 2020-08-16 PROCEDURE — 96374 THER/PROPH/DIAG INJ IV PUSH: CPT

## 2020-08-16 PROCEDURE — 96361 HYDRATE IV INFUSION ADD-ON: CPT

## 2020-08-16 PROCEDURE — 36415 COLL VENOUS BLD VENIPUNCTURE: CPT

## 2020-08-16 RX ORDER — OXYCODONE AND ACETAMINOPHEN 5; 325 MG/1; MG/1
1 TABLET ORAL
Qty: 12 TAB | Refills: 0 | Status: SHIPPED | OUTPATIENT
Start: 2020-08-16 | End: 2020-08-19

## 2020-08-16 RX ORDER — MORPHINE SULFATE 2 MG/ML
4 INJECTION, SOLUTION INTRAMUSCULAR; INTRAVENOUS
Status: COMPLETED | OUTPATIENT
Start: 2020-08-16 | End: 2020-08-16

## 2020-08-16 RX ORDER — MORPHINE SULFATE 4 MG/ML
4 INJECTION INTRAVENOUS
Status: COMPLETED | OUTPATIENT
Start: 2020-08-16 | End: 2020-08-16

## 2020-08-16 RX ORDER — NALOXONE HYDROCHLORIDE 4 MG/.1ML
SPRAY NASAL
Qty: 1 EACH | Refills: 0 | Status: SHIPPED | OUTPATIENT
Start: 2020-08-16 | End: 2021-06-13

## 2020-08-16 RX ORDER — OXYCODONE AND ACETAMINOPHEN 5; 325 MG/1; MG/1
1 TABLET ORAL
Status: COMPLETED | OUTPATIENT
Start: 2020-08-16 | End: 2020-08-16

## 2020-08-16 RX ORDER — ONDANSETRON 2 MG/ML
8 INJECTION INTRAMUSCULAR; INTRAVENOUS
Status: COMPLETED | OUTPATIENT
Start: 2020-08-16 | End: 2020-08-16

## 2020-08-16 RX ORDER — ONDANSETRON 4 MG/1
4 TABLET, ORALLY DISINTEGRATING ORAL
Status: COMPLETED | OUTPATIENT
Start: 2020-08-16 | End: 2020-08-16

## 2020-08-16 RX ADMIN — ONDANSETRON 8 MG: 2 INJECTION INTRAMUSCULAR; INTRAVENOUS at 19:00

## 2020-08-16 RX ADMIN — MORPHINE SULFATE 4 MG: 4 INJECTION INTRAVENOUS at 21:05

## 2020-08-16 RX ADMIN — SODIUM CHLORIDE 1000 ML: 900 INJECTION, SOLUTION INTRAVENOUS at 19:00

## 2020-08-16 RX ADMIN — OXYCODONE HYDROCHLORIDE AND ACETAMINOPHEN 1 TABLET: 5; 325 TABLET ORAL at 21:34

## 2020-08-16 RX ADMIN — MORPHINE SULFATE 4 MG: 2 INJECTION, SOLUTION INTRAMUSCULAR; INTRAVENOUS at 19:00

## 2020-08-16 RX ADMIN — ONDANSETRON 4 MG: 4 TABLET, ORALLY DISINTEGRATING ORAL at 21:34

## 2020-08-16 NOTE — ED PROVIDER NOTES
Patient is a 29-year-old who is coming in with lower abdominal pain. Pain started 2 days ago and is located in the right lower quadrant. [facility], and discharged home. Patient had an ultrasound done earlier today at her primary care doctor's office that showed bilateral ovarian cysts and no documented IUP. Patient denies vaginal bleeding and discharge. Patient has not had nausea or vomiting and reports low-grade fevers, none greater than 100.5. She is taken Tylenol for the pain but still reports that it is severe and uncontrolled. The history is provided by the patient. Pregnancy Problem    Associated symptoms include vomiting. Pertinent negatives include no fever, no diarrhea, no constipation, no dysuria and no chest pain. Vomiting    Pertinent negatives include no chills, no fever, no abdominal pain and no diarrhea. Past Medical History:   Diagnosis Date    ADHD (attention deficit hyperactivity disorder) 2008    Dr. Loli Keene Ankle fracture, left     4x    Asthma attack     Chronic kidney disease     kidney stones    Depression, major 8/2013    Dr. Scotty Pederson allergy 2010    beef (severe), tomoato (severe), others. Dr. Jason Rock GERD (gastroesophageal reflux disease)     IUD (intrauterine device) in place 7/2014    Dr. Pasco Claude    Nephrolithiasis     last 8/2012 s/p lithotripsy. calcium    Panic anxiety syndrome 8/2013    Dr. Loli Keene Pap smear for cervical cancer screening     Dr Pasco Claude       Past Surgical History:   Procedure Laterality Date    HX TONSIL AND ADENOIDECTOMY      LITHOTRIPSY  3 total         No family history on file.     Social History     Socioeconomic History    Marital status:      Spouse name: Not on file    Number of children: Not on file    Years of education: Not on file    Highest education level: Not on file   Occupational History    Not on file   Social Needs    Financial resource strain: Not on file  Food insecurity     Worry: Not on file     Inability: Not on file    Transportation needs     Medical: Not on file     Non-medical: Not on file   Tobacco Use    Smoking status: Current Every Day Smoker     Types: Cigarettes    Smokeless tobacco: Never Used   Substance and Sexual Activity    Alcohol use: No    Drug use: No    Sexual activity: Yes   Lifestyle    Physical activity     Days per week: Not on file     Minutes per session: Not on file    Stress: Not on file   Relationships    Social connections     Talks on phone: Not on file     Gets together: Not on file     Attends Uatsdin service: Not on file     Active member of club or organization: Not on file     Attends meetings of clubs or organizations: Not on file     Relationship status: Not on file    Intimate partner violence     Fear of current or ex partner: Not on file     Emotionally abused: Not on file     Physically abused: Not on file     Forced sexual activity: Not on file   Other Topics Concern    Not on file   Social History Narrative    Not on file         ALLERGIES: Bactrim [sulfamethoprim ds] and Griseofulvin    Review of Systems   Constitutional: Negative for chills and fever. Respiratory: Negative for shortness of breath. Cardiovascular: Negative for chest pain. Gastrointestinal: Positive for vomiting. Negative for abdominal pain, constipation and diarrhea. Genitourinary: Positive for pelvic pain. Negative for dysuria, vaginal bleeding and vaginal discharge. Neurological: Negative for dizziness and light-headedness. All other systems reviewed and are negative. Vitals:    08/16/20 1657 08/16/20 1903   BP: (!) 138/97    Pulse: (!) 104    Resp: 16    Temp: 100.1 °F (37.8 °C)    SpO2: 98% 98%   Weight: 95.3 kg (210 lb)    Height: 5' (1.524 m)             Physical Exam  Vitals signs and nursing note reviewed. Constitutional:       Appearance: She is well-developed.    HENT:      Head: Normocephalic and atraumatic. Eyes:      Pupils: Pupils are equal, round, and reactive to light. Neck:      Musculoskeletal: Normal range of motion and neck supple. Cardiovascular:      Rate and Rhythm: Normal rate and regular rhythm. Pulmonary:      Effort: Pulmonary effort is normal.      Breath sounds: Normal breath sounds. Abdominal:      General: There is no distension. Palpations: Abdomen is soft. Tenderness: There is abdominal tenderness in the right lower quadrant. Skin:     General: Skin is warm and dry. Capillary Refill: Capillary refill takes less than 2 seconds. Neurological:      Mental Status: She is alert and oriented to person, place, and time. Psychiatric:         Behavior: Behavior normal.          MDM  Number of Diagnoses or Management Options  Diagnosis management comments: DDX ectopic pregnancy, ovarian cyst, ovarian torsion, complex ovarian cyst, appendicitis    Results were discussed with the patient and she has had no fevers, has normal white count, and has no other associated symptoms with the exception of the pain it is less likely that her symptoms are related to an infected appendix and more likely that they are related to the large ovarian cysts seen on her pelvic ultrasound. Patient has been hemodynamically stable in the emergency department and is otherwise well-appearing. We will attempt to get the patient's pain under control for discharge and she was given strict return precautions for an MRI to evaluate for appendicitis if she developed fevers, chills, diarrhea, worsening abdominal pain, or other new or unusual symptoms. Patient expressed understanding and agreed with the plan. hCG is approximately 300, I would not expect to see an IUP at this time as the cysts are simple they are unlikely to be secondary to an ectopic pregnancy. Patient will have her hCG levels followed as an outpatient by her OB/GYN.          Procedures

## 2020-08-16 NOTE — ED TRIAGE NOTES
Pt reports + pregnancy test with nausea and vomiting that started Friday. Pt had transvaginal US today at [facility] and found a cyst on each ovary. Pt reports pelvic pain that is getting worse. Pt denies vaginal discharge or bleeding. Pt took tylenol at 1200.

## 2020-08-18 RX ORDER — ONDANSETRON 8 MG/1
8 TABLET, ORALLY DISINTEGRATING ORAL
Qty: 15 TAB | Refills: 0 | Status: SHIPPED | OUTPATIENT
Start: 2020-08-18 | End: 2021-06-11

## 2020-10-04 ENCOUNTER — HOSPITAL ENCOUNTER (OUTPATIENT)
Dept: PREADMISSION TESTING | Age: 25
Discharge: HOME | End: 2020-10-04
Payer: COMMERCIAL

## 2020-10-04 PROCEDURE — 87635 SARS-COV-2 COVID-19 AMP PRB: CPT

## 2020-10-05 LAB — SARS-COV-2, COV2NT: NOT DETECTED

## 2020-10-05 RX ORDER — DOXYCYCLINE 100 MG/1
100 CAPSULE ORAL 2 TIMES DAILY
COMMUNITY
End: 2021-06-13

## 2020-10-05 RX ORDER — SERTRALINE HYDROCHLORIDE 50 MG/1
75 TABLET, FILM COATED ORAL
COMMUNITY

## 2020-10-05 RX ORDER — OMEPRAZOLE 20 MG/1
20 CAPSULE, DELAYED RELEASE ORAL DAILY
COMMUNITY

## 2020-10-05 NOTE — PERIOP NOTES
[address] [address] [facility]   MAIN OR                                  [phone]   MAIN PRE OP                          [phone]                                                                                AMBULATORY PRE OP          [phone]  PRE-ADMISSION TESTING    [phone]   Surgery Date:  10/8/20       Is surgery arrival time given by surgeon? YES  NO  If NO, [facility] staff will call you between 3 and 7pm the day before your surgery with your arrival time. (If your surgery is on a Monday, we will call you the Friday before.)    Call [phone] after 7pm Monday-Friday if you did not receive this call. INSTRUCTIONS BEFORE YOUR SURGERY   When You  Arrive Arrive at the 2nd [address] [facility] on the day of your surgery  Have your insurance card, photo ID, and any copayment (if needed)   Food   and   Drink NO food or drink after midnight the night before surgery    This means NO water, gum, mints, coffee, juice, etc.  No alcohol (beer, wine, liquor) 24 hours before and after surgery   Medications to   TAKE   Morning of Surgery MEDICATIONS TO TAKE THE MORNING OF SURGERY WITH A SIP OF WATER:       Medications  To  STOP      7 days before surgery  Non-Steroidal anti-inflammatory Drugs (NSAID's): for example, Ibuprofen (Advil, Motrin), Naproxen (Aleve)   Aspirin, if taking for pain    Herbal supplements, vitamins, and fish oil   Other:  (Pain medications not listed above, including Tylenol may be taken)   Blood  Thinners  If you take  Aspirin, Plavix, Coumadin, or any blood-thinning or anti-blood clot medicine, talk to the doctor who prescribed the medications for pre-operative instructions.    Bathing Clothing  Jewelry  Valuables      If you shower the morning of surgery, please do not apply anything to your skin (lotions, powders, deodorant, or makeup, especially mascara)   Follow Chlorhexidine [employer] body wash instructions provided to you during PAT appointment. Begin 3 days prior to surgery.  Do not shave or trim anywhere 24 hours before surgery   Wear your hair loose or down; no pony-tails, buns, or metal hair clips   Wear loose, comfortable, clean clothes   Wear glasses instead of contacts   Leave money, valuables, and jewelry, including body piercings, at home   Going Home - or Spending the Night  SAME-DAY SURGERY: You must have a responsible adult drive you home and stay with you 24 hours after surgery   ADMITS: If your doctor is keeping you in the hospital after surgery, leave personal belongings/luggage in your car until you have a hospital room number. Hospital discharge time is 12 noon  Drivers must be here before 12 noon unless you are told differently   Special Instructions      Follow all instructions so your surgery wont be cancelled. Please, be on time. If a situation occurs and you are delayed the day of surgery, call [phone] or [phone]. If your physical condition changes (like a fever, cold, flu, etc.) call your surgeon. Home medication(s) reviewed and verified via   PHONE   LIST   VERBAL   during PAT appointment. The patient was contacted by  PHONE    IN-PERSON  The patient verbalizes understanding of all instructions and   DOES   DOES NOT   need reinforcement.

## 2020-10-07 ENCOUNTER — ANESTHESIA EVENT (OUTPATIENT)
Dept: SURGERY | Age: 25
End: 2020-10-07
Payer: COMMERCIAL

## 2020-10-08 ENCOUNTER — ANESTHESIA (OUTPATIENT)
Dept: SURGERY | Age: 25
End: 2020-10-08
Payer: COMMERCIAL

## 2020-10-08 ENCOUNTER — HOSPITAL ENCOUNTER (OUTPATIENT)
Age: 25
Discharge: HOME | End: 2020-10-08
Attending: STUDENT IN AN ORGANIZED HEALTH CARE EDUCATION/TRAINING PROGRAM | Admitting: STUDENT IN AN ORGANIZED HEALTH CARE EDUCATION/TRAINING PROGRAM
Payer: COMMERCIAL

## 2020-10-08 VITALS
OXYGEN SATURATION: 98 % | SYSTOLIC BLOOD PRESSURE: 132 MMHG | HEIGHT: 60 IN | BODY MASS INDEX: 42.89 KG/M2 | DIASTOLIC BLOOD PRESSURE: 93 MMHG | RESPIRATION RATE: 16 BRPM | HEART RATE: 108 BPM | WEIGHT: 218.48 LBS | TEMPERATURE: 97.7 F

## 2020-10-08 DIAGNOSIS — G89.18 POSTOPERATIVE PAIN: Primary | ICD-10-CM

## 2020-10-08 PROBLEM — O02.1 MISSED ABORTION: Status: ACTIVE | Noted: 2020-10-08

## 2020-10-08 LAB
ABO + RH BLD: NORMAL
BLOOD GROUP ANTIBODIES SERPL: NORMAL
ERYTHROCYTE [DISTWIDTH] IN BLOOD BY AUTOMATED COUNT: 15.4 % (ref 11.5–14.5)
HCT VFR BLD AUTO: 39.3 % (ref 35–47)
HGB BLD-MCNC: 12.8 G/DL (ref 11.5–16)
MCH RBC QN AUTO: 27.7 PG (ref 26–34)
MCHC RBC AUTO-ENTMCNC: 32.6 G/DL (ref 30–36.5)
MCV RBC AUTO: 85.1 FL (ref 80–99)
NRBC # BLD: 0 K/UL (ref 0–0.01)
NRBC BLD-RTO: 0 PER 100 WBC
PLATELET # BLD AUTO: 377 K/UL (ref 150–400)
PMV BLD AUTO: 8.7 FL (ref 8.9–12.9)
RBC # BLD AUTO: 4.62 M/UL (ref 3.8–5.2)
SPECIMEN EXP DATE BLD: NORMAL
WBC # BLD AUTO: 6.3 K/UL (ref 3.6–11)

## 2020-10-08 PROCEDURE — 76210000040 HC AMBSU PH I REC FIRST 0.5 HR: Performed by: STUDENT IN AN ORGANIZED HEALTH CARE EDUCATION/TRAINING PROGRAM

## 2020-10-08 PROCEDURE — 86900 BLOOD TYPING SEROLOGIC ABO: CPT

## 2020-10-08 PROCEDURE — 77030003666 HC NDL SPINAL BD -A: Performed by: STUDENT IN AN ORGANIZED HEALTH CARE EDUCATION/TRAINING PROGRAM

## 2020-10-08 PROCEDURE — 88305 TISSUE EXAM BY PATHOLOGIST: CPT

## 2020-10-08 PROCEDURE — 77030008578 HC TBNG UTER SUC BUSS -A: Performed by: STUDENT IN AN ORGANIZED HEALTH CARE EDUCATION/TRAINING PROGRAM

## 2020-10-08 PROCEDURE — 2709999900 HC NON-CHARGEABLE SUPPLY: Performed by: STUDENT IN AN ORGANIZED HEALTH CARE EDUCATION/TRAINING PROGRAM

## 2020-10-08 PROCEDURE — 74011000250 HC RX REV CODE- 250: Performed by: ANESTHESIOLOGY

## 2020-10-08 PROCEDURE — 74011250636 HC RX REV CODE- 250/636: Performed by: ANESTHESIOLOGY

## 2020-10-08 PROCEDURE — 77030040361 HC SLV COMPR DVT MDII -B

## 2020-10-08 PROCEDURE — 77030040922 HC BLNKT HYPOTHRM STRY -A

## 2020-10-08 PROCEDURE — 77030019905 HC CATH URETH INTMIT MDII -A: Performed by: STUDENT IN AN ORGANIZED HEALTH CARE EDUCATION/TRAINING PROGRAM

## 2020-10-08 PROCEDURE — 76210000046 HC AMBSU PH II REC FIRST 0.5 HR: Performed by: STUDENT IN AN ORGANIZED HEALTH CARE EDUCATION/TRAINING PROGRAM

## 2020-10-08 PROCEDURE — 76030000000 HC AMB SURG OR TIME 0.5 TO 1: Performed by: STUDENT IN AN ORGANIZED HEALTH CARE EDUCATION/TRAINING PROGRAM

## 2020-10-08 PROCEDURE — 76060000061 HC AMB SURG ANES 0.5 TO 1 HR: Performed by: STUDENT IN AN ORGANIZED HEALTH CARE EDUCATION/TRAINING PROGRAM

## 2020-10-08 PROCEDURE — 36415 COLL VENOUS BLD VENIPUNCTURE: CPT

## 2020-10-08 PROCEDURE — 74011250636 HC RX REV CODE- 250/636: Performed by: STUDENT IN AN ORGANIZED HEALTH CARE EDUCATION/TRAINING PROGRAM

## 2020-10-08 PROCEDURE — 74011000250 HC RX REV CODE- 250: Performed by: STUDENT IN AN ORGANIZED HEALTH CARE EDUCATION/TRAINING PROGRAM

## 2020-10-08 PROCEDURE — 77030008684 HC TU ET CUF COVD -B: Performed by: ANESTHESIOLOGY

## 2020-10-08 PROCEDURE — 77030018836 HC SOL IRR NACL ICUM -A: Performed by: STUDENT IN AN ORGANIZED HEALTH CARE EDUCATION/TRAINING PROGRAM

## 2020-10-08 PROCEDURE — 77030011210: Performed by: STUDENT IN AN ORGANIZED HEALTH CARE EDUCATION/TRAINING PROGRAM

## 2020-10-08 PROCEDURE — 85027 COMPLETE CBC AUTOMATED: CPT

## 2020-10-08 RX ORDER — ONDANSETRON 2 MG/ML
4 INJECTION INTRAMUSCULAR; INTRAVENOUS AS NEEDED
Status: DISCONTINUED | OUTPATIENT
Start: 2020-10-08 | End: 2020-10-08 | Stop reason: HOSPADM

## 2020-10-08 RX ORDER — BUPIVACAINE HYDROCHLORIDE 2.5 MG/ML
INJECTION, SOLUTION EPIDURAL; INFILTRATION; INTRACAUDAL AS NEEDED
Status: DISCONTINUED | OUTPATIENT
Start: 2020-10-08 | End: 2020-10-08 | Stop reason: HOSPADM

## 2020-10-08 RX ORDER — FENTANYL CITRATE 50 UG/ML
INJECTION, SOLUTION INTRAMUSCULAR; INTRAVENOUS AS NEEDED
Status: DISCONTINUED | OUTPATIENT
Start: 2020-10-08 | End: 2020-10-08 | Stop reason: HOSPADM

## 2020-10-08 RX ORDER — ONDANSETRON 2 MG/ML
INJECTION INTRAMUSCULAR; INTRAVENOUS AS NEEDED
Status: DISCONTINUED | OUTPATIENT
Start: 2020-10-08 | End: 2020-10-08 | Stop reason: HOSPADM

## 2020-10-08 RX ORDER — SODIUM CHLORIDE, SODIUM LACTATE, POTASSIUM CHLORIDE, CALCIUM CHLORIDE 600; 310; 30; 20 MG/100ML; MG/100ML; MG/100ML; MG/100ML
125 INJECTION, SOLUTION INTRAVENOUS CONTINUOUS
Status: DISCONTINUED | OUTPATIENT
Start: 2020-10-08 | End: 2020-10-08 | Stop reason: HOSPADM

## 2020-10-08 RX ORDER — KETOROLAC TROMETHAMINE 30 MG/ML
30 INJECTION, SOLUTION INTRAMUSCULAR; INTRAVENOUS
Status: COMPLETED | OUTPATIENT
Start: 2020-10-08 | End: 2020-10-08

## 2020-10-08 RX ORDER — PROPOFOL 10 MG/ML
INJECTION, EMULSION INTRAVENOUS
Status: DISCONTINUED | OUTPATIENT
Start: 2020-10-08 | End: 2020-10-08 | Stop reason: HOSPADM

## 2020-10-08 RX ORDER — DEXAMETHASONE SODIUM PHOSPHATE 4 MG/ML
INJECTION, SOLUTION INTRA-ARTICULAR; INTRALESIONAL; INTRAMUSCULAR; INTRAVENOUS; SOFT TISSUE AS NEEDED
Status: DISCONTINUED | OUTPATIENT
Start: 2020-10-08 | End: 2020-10-08 | Stop reason: HOSPADM

## 2020-10-08 RX ORDER — SODIUM CHLORIDE 0.9 % (FLUSH) 0.9 %
5-40 SYRINGE (ML) INJECTION AS NEEDED
Status: DISCONTINUED | OUTPATIENT
Start: 2020-10-08 | End: 2020-10-08 | Stop reason: HOSPADM

## 2020-10-08 RX ORDER — HYDROMORPHONE HYDROCHLORIDE 1 MG/ML
.5-1 INJECTION, SOLUTION INTRAMUSCULAR; INTRAVENOUS; SUBCUTANEOUS
Status: DISCONTINUED | OUTPATIENT
Start: 2020-10-08 | End: 2020-10-08 | Stop reason: HOSPADM

## 2020-10-08 RX ORDER — IBUPROFEN 800 MG/1
800 TABLET ORAL
Qty: 40 TAB | Refills: 0 | OUTPATIENT
Start: 2020-10-08 | End: 2021-06-11

## 2020-10-08 RX ORDER — MIDAZOLAM HYDROCHLORIDE 1 MG/ML
INJECTION, SOLUTION INTRAMUSCULAR; INTRAVENOUS AS NEEDED
Status: DISCONTINUED | OUTPATIENT
Start: 2020-10-08 | End: 2020-10-08 | Stop reason: HOSPADM

## 2020-10-08 RX ORDER — PROPOFOL 10 MG/ML
INJECTION, EMULSION INTRAVENOUS AS NEEDED
Status: DISCONTINUED | OUTPATIENT
Start: 2020-10-08 | End: 2020-10-08 | Stop reason: HOSPADM

## 2020-10-08 RX ORDER — EPHEDRINE SULFATE/0.9% NACL/PF 50 MG/5 ML
SYRINGE (ML) INTRAVENOUS AS NEEDED
Status: DISCONTINUED | OUTPATIENT
Start: 2020-10-08 | End: 2020-10-08 | Stop reason: HOSPADM

## 2020-10-08 RX ORDER — LIDOCAINE HYDROCHLORIDE 10 MG/ML
0.1 INJECTION, SOLUTION EPIDURAL; INFILTRATION; INTRACAUDAL; PERINEURAL AS NEEDED
Status: DISCONTINUED | OUTPATIENT
Start: 2020-10-08 | End: 2020-10-08 | Stop reason: HOSPADM

## 2020-10-08 RX ORDER — SUCCINYLCHOLINE CHLORIDE 20 MG/ML
INJECTION INTRAMUSCULAR; INTRAVENOUS AS NEEDED
Status: DISCONTINUED | OUTPATIENT
Start: 2020-10-08 | End: 2020-10-08 | Stop reason: HOSPADM

## 2020-10-08 RX ORDER — SODIUM CHLORIDE 0.9 % (FLUSH) 0.9 %
5-40 SYRINGE (ML) INJECTION EVERY 8 HOURS
Status: DISCONTINUED | OUTPATIENT
Start: 2020-10-08 | End: 2020-10-08 | Stop reason: HOSPADM

## 2020-10-08 RX ORDER — SODIUM CHLORIDE, SODIUM LACTATE, POTASSIUM CHLORIDE, CALCIUM CHLORIDE 600; 310; 30; 20 MG/100ML; MG/100ML; MG/100ML; MG/100ML
INJECTION, SOLUTION INTRAVENOUS
Status: DISCONTINUED | OUTPATIENT
Start: 2020-10-08 | End: 2020-10-08 | Stop reason: HOSPADM

## 2020-10-08 RX ADMIN — MIDAZOLAM HYDROCHLORIDE 2 MG: 2 INJECTION, SOLUTION INTRAMUSCULAR; INTRAVENOUS at 10:53

## 2020-10-08 RX ADMIN — SUCCINYLCHOLINE CHLORIDE 100 MG: 20 INJECTION, SOLUTION INTRAMUSCULAR; INTRAVENOUS; PARENTERAL at 11:01

## 2020-10-08 RX ADMIN — FENTANYL CITRATE 100 MCG: 0.05 INJECTION, SOLUTION INTRAMUSCULAR; INTRAVENOUS at 11:01

## 2020-10-08 RX ADMIN — KETOROLAC TROMETHAMINE 30 MG: 30 INJECTION, SOLUTION INTRAMUSCULAR at 12:10

## 2020-10-08 RX ADMIN — PROPOFOL 200 MG: 10 INJECTION, EMULSION INTRAVENOUS at 11:01

## 2020-10-08 RX ADMIN — Medication 10 MG: at 11:12

## 2020-10-08 RX ADMIN — DEXAMETHASONE SODIUM PHOSPHATE 10 MG: 4 INJECTION, SOLUTION INTRAMUSCULAR; INTRAVENOUS at 11:14

## 2020-10-08 RX ADMIN — ONDANSETRON HYDROCHLORIDE 4 MG: 2 SOLUTION INTRAMUSCULAR; INTRAVENOUS at 11:14

## 2020-10-08 RX ADMIN — PROPOFOL 75 MCG/KG/MIN: 10 INJECTION, EMULSION INTRAVENOUS at 11:05

## 2020-10-08 RX ADMIN — SODIUM CHLORIDE, SODIUM LACTATE, POTASSIUM CHLORIDE, AND CALCIUM CHLORIDE 125 ML/HR: 600; 310; 30; 20 INJECTION, SOLUTION INTRAVENOUS at 08:49

## 2020-10-08 RX ADMIN — PROPOFOL 30 MG: 10 INJECTION, EMULSION INTRAVENOUS at 11:26

## 2020-10-08 NOTE — H&P
Day of surgery H&P Update     Pt seen and examined. No interval changes. Please see paper H&P from the office on 20. Diagnosis is Missed  at 9wks. Planned procedure is Suction dilation and currettage. Consents reviewed and signed and all questions answered. SCDs for DVT PPx. ABX PPx Doxy po before and after procedure (pt has med to take at home). Proceed to OR.      Ronn Ford, DO

## 2020-10-08 NOTE — OP NOTES
Name: Herb Roberts  MR#: mitral regurgitation  : 1995    DATE OF SERVICE: 10/8/2020     PREOPERATIVE DIAGNOSIS:  A 30year-old  1, para 0 with missed  at 10 weeks. POSTOPERATIVE DIAGNOSES:  Same      PROCEDURE  PERFORMED:  Dilation and curettage with suction. SURGEON:  Leanna Hsu DO.     ASSISTANT:  None. ANESTHESIA:  General with paracervical block. ESTIMATED BLOOD LOSS:  100cc     URINE OUTPUT:  75cc     IV FLUIDS:  1000 cc of crystalloid     SPECIMENS REMOVED:  Products of conception. FINDINGS:  A ~10 week size anteverted uterus, cervix closed. Tissue and clot consistent with products of conception evacuated from the uterus. COMPLICATIONS:  None. INDICATIONS:  This is a 30year-old  1, para 0 who presented to the office for management of missed AB at 9wks diagnosed there previous week by her JESSE. Was found to have a missed  at 10 weeks' gestation. She was counseled on options, and ultimately decided upon dilation and curettage to manage her miscarriage. Her blood type is A pos     DESCRIPTION OF THE PROCEDURE IN DETAIL:  The patient was taken to the operating room, positioned on the operating room table in supine position. After adequate anesthesia was achieved via general anesthesia with an LMA, she had her legs positioned in the [address] [place] [address]. Her perineum was prepped and she was draped in a sterile fashion. After a time-out was performed, a sterile speculum was inserted into the vagina. The cervix was visualized and was grasped with a ring forceps. A paracervical block was performed using 0.25% Marcaine, a total of 20 mL was injected. The cervix was then dilated up to a 31F used Min Rhiannon dilators. An 10 mm curved suction curette was then inserted and the uterine contents evacuated x3 passes, followed by sharp curettage which was done gently and a gritty texture was noted throughout.   One additional pass with suction was performed. At this point, all instruments were removed from the uterus, cervix and vagina. The patient had her legs taken down out of stirrups, and she was awoken from anesthesia and taken to the recovery room in stable condition. All counts were correct at the end of the case.         Johanna Journey, DO

## 2020-10-08 NOTE — ANESTHESIA PREPROCEDURE EVALUATION
Relevant Problems   No relevant active problems       Anesthetic History   No history of anesthetic complications            Review of Systems / Medical History  Patient summary reviewed and pertinent labs reviewed    Pulmonary            Asthma : well controlled       Neuro/Psych         Psychiatric history     Cardiovascular                  Exercise tolerance: >4 METS     GI/Hepatic/Renal     GERD: well controlled    Renal disease: stones       Endo/Other        Morbid obesity     Other Findings              Physical Exam    Airway  Mallampati: III  TM Distance: 4 - 6 cm  Neck ROM: normal range of motion   Mouth opening: Normal     Cardiovascular    Rhythm: regular  Rate: normal         Dental    Dentition: Upper dentition intact and Lower dentition intact     Pulmonary  Breath sounds clear to auscultation               Abdominal         Other Findings            Anesthetic Plan    ASA: 2  Anesthesia type: MAC          Induction: Intravenous  Anesthetic plan and risks discussed with: Patient

## 2020-10-08 NOTE — ANESTHESIA POSTPROCEDURE EVALUATION
Procedure(s):  SUCTION DILATATION AND CURETTAGE. MAC    Anesthesia Post Evaluation      Multimodal analgesia: multimodal analgesia not used between 6 hours prior to anesthesia start to PACU discharge  Patient location during evaluation: PACU  Patient participation: complete - patient participated  Level of consciousness: awake  Pain management: adequate  Airway patency: patent  Anesthetic complications: no  Cardiovascular status: acceptable, blood pressure returned to baseline and hemodynamically stable  Respiratory status: acceptable  Hydration status: acceptable  Post anesthesia nausea and vomiting:  controlled  Final Post Anesthesia Temperature Assessment:  Normothermia (36.0-37.5 degrees C)      INITIAL Post-op Vital signs:   Vitals Value Taken Time   /100 10/8/2020 12:10 PM   Temp 37.1 °C (98.8 °F) 10/8/2020 11:40 AM   Pulse 107 10/8/2020 12:11 PM   Resp 18 10/8/2020 12:11 PM   SpO2 97 % 10/8/2020 12:11 PM   Vitals shown include unvalidated device data.

## 2020-10-08 NOTE — PERIOP NOTES
Instructions reviewed with spouse. All questions answered. Bereavement packet provided. Discharge paperwork provided.

## 2020-10-08 NOTE — DISCHARGE INSTRUCTIONS
Discharge Instructions for outpatient GYN surgery    Patient ID:  Bill Gray  [id number]  30 y.o.  1995    Take Home Medications     Follow-up with Dr Gilbert Caal in 2 weeks, call office for appointment, [phone]    Follow-up care is a key part of your treatment and safety. Be sure to schedule and keep all recommended follow up appointments    What to Expect at [address] [facility] might feel a bit sleepy, groggy or nauseous today because of the anesthetic or pain medication you received. Do not drive today. These symptoms should resolve by tomorrow. You will probably feel some cramping over the next day or two- this is normal.  You may take an over-the-counter pain medication such as Tylenol, Aleve, Motrin, Advil (ibuprofen) or you may have been given a prescription pain medication. Try to limit use of prescription pain medication to just a day or two, as they can cause constipation. You will probably experience some light vaginal bleeding or spotting. This is normal.     How can you care for yourself at home? Activity  Pelvic rest for 2 weeks (nothing in your vagina such as tampons, intercourse or douching)  You man return to work and normal activities tomorrow or the next day. If you are feeling well, you can also resume exercise. If you are having pain or not feeling well, you should wait a few days before exercising. Diet  Regular diet as tolerated  Drink plenty of fluids    Medicines  Take pain medicines as directed by your doctor. If you a prescription for pain medicine, take as needed . If you are not taking a prescription pain medicine, take an over-the-counter medicine such as acetaminophen (Tylenol), ibuprofen (Advil, Motrin), or naproxen (Aleve). Read and follow all instructions on the label. Do not take two or more pain medicines at the same time unless the doctor told you to. Many pain medicines contain acetaminophen, which is Tylenol.  Too much Tylenol can be harmful. If your doctor prescribed antibiotics, take them as directed. Do not stop taking them just because you feel better. If you think your pain medicine is making your stomach upset:  Take your medicine after meals (unless your doctor tells you not to). Ask your doctor for a different pain medicine. Call your doctor  or seek immediate medical care if you have:  bright red vaginal bleeding that soaks one or more pads in an hour, or you have large clots. foul-smelling discharge from your vagina. persistent nausea and vomiting  pain that does not get better after you take pain medicine. signs of infection, such as: Increased pain, swelling, warmth, or redness. A persistent fever of 101.5 F  signs of a blood clot, such as:  Pain, redness or swelling in your lower extremeties  You have trouble passing urine or stool, especially if you have pain or swelling in your lower belly. hot flashes, sweating, flushing, or a fast or pounding heartbeat.:  no bowel movement after taking a laxative. loss of consciousness  sudden chest pain and shortness of breath, or you cough up blood. persistent abdominal pain despite taking pain medication    DISCHARGE SUMMARY from your Nurse    The following personal items collected during your admission are returned to you:   Dental Appliance: Dental Appliances: None  Vision:    Hearing Aid:    Jewelry: Jewelry: None  Clothing: Clothing: Undergarments, Pants, Shirt, Footwear  Other Valuables: Other Valuables: Eyeglasses  Valuables sent to safe:      PATIENT INSTRUCTIONS:    After general anesthesia or intravenous sedation, for 24 hours or while taking prescription Narcotics:  · Limit your activities  · Do not drive and operate hazardous machinery  · Do not make important personal or business decisions  · Do  not drink alcoholic beverages  · If you have not urinated within 8 hours after discharge, please contact your surgeon on call.     Report the following to your surgeon:  · Excessive pain, swelling, redness or odor of or around the surgical area  · Temperature over 100.5  · Nausea and vomiting lasting longer than 4 hours or if unable to take medications  · Any signs of decreased circulation or nerve impairment to extremity: change in color, persistent  numbness, tingling, coldness or increase pain  · Any questions    COUGH AND DEEP BREATHE    Breathing deep and coughing are very important exercises to do after surgery. Deep breathing and coughing open the little air tubes and air sacks in your lungs. You take deep breaths every day. You may not even notice - it is just something you do when you sigh or yawn. It is a natural exercise you do to keep these air passages open. After surgery, take deep breaths and cough, on purpose. Coughing and deep breathing help prevent bronchitis and pneumonia after surgery. If you had chest or belly surgery, use a pillow as a \"hug brandon\" and hold it tightly to your chest or belly when you cough. DIRECTIONS:  · Take 10 to 15 slow deep breaths every hour while awake. · Breathe in deeply, and hold it for 2 seconds. · Exhale slowly through puckered lips, like blowing up a balloon. · After every 4th or 5th deep breath, hug your pillow to your chest or belly and give a hard, deep cough. Yes, it will probably hurt. But doing this exercise is very important part of healing after surgery. Take your pain medicine to help you do this exercise without too much pain. IF YOU HAVE BEEN DIAGNOSED WITH SLEEP APNEA, PLEASE USE YOUR SLEEP APNEA DEVICE OR CPAP MACHINE WHEN YOU INTEND TO NAP AFTER TAKING PAIN MEDICATION. Ankle Pumps    Ankle pumps increase the circulation of oxygenated blood to your lower extremities and decrease your risk for circulation problems such as blood clots.  They also stretch the muscles, tendons and ligaments in your foot and ankle, and prevent joint contracture in the ankle and foot, especially after surgeries on the legs. It is important to do ankle pump exercises regularly after surgery because immobility increases your risk for developing a blood clot. Your doctor may also have you take an Aspirin for the next few days as well. If your doctor did not ask you to take an Aspirin, consult with him before starting Aspirin therapy on your own. Slowly point your foot forward, feeling the muscles on the top of your lower leg stretch, and hold this position for 5 seconds. Next, pull your foot back toward you as far as possible, stretching the calf muscles, and hold that position for 5 seconds. Repeat with the other foot. Perform 10 repetitions every hour while awake for both ankles if possible (down and then up with the foot once is one repetition). You should feel gentle stretching of the muscles in your lower leg when doing this exercise. If you feel pain, or your range of motion is limited, don't  Push too hard. Only go the limit your joint and muscles will let you go. If you have increasing pain, progressively worsening leg warmth or swelling, STOP the exercise and call your doctor. Below is information about the medications your doctor is prescribing after your visit:    Other information in your discharge envelope:  []     PRESCRIPTIONS  []     SCHOOL/WORK NOTE  []     INFECTION PREVENTION  []     [address]  []     REGIONAL NERVE BLOCK ON QUE PAMPHLET   []     EXPAREL  []     HANDICAP APPLICATION         These are general instructions for a healthy lifestyle:    *  Please give a list of your current medications to your Primary Care Provider. *  Please update this list whenever your medications are discontinued, doses are      changed, or new medications (including over-the-counter products) are added. *  Please carry medication information at all times in case of emergency situations.     About Smoking  No smoking / No tobacco products / Avoid exposure to second hand smoke    Surgeon General's Warning:  Quitting smoking now greatly reduces serious risk to your health. Obesity, smoking, and sedentary lifestyle greatly increases your risk for illness and disease. A healthy diet, regular physical exercise & weight monitoring are important for maintaining a healthy lifestyle. Congestive Heart Failure  You may be retaining fluid if you have a history of heart failure or if you experience any of the following symptoms:  Weight gain of 3 pounds or more overnight or 5 pounds in a week, increased swelling in our hands or feet or shortness of breath while lying flat in bed. Please call your doctor as soon as you notice any of these symptoms; do not wait until your next office visit. Recognize signs and symptoms of STROKE:  F - face looks uneven  A - arms unable to move or move even  S - speech slurred or non-existent  T - time-call 911 as soon as signs and symptoms begin-DO NOT go         Back to bed or wait to see if you get better-TIME IS BRAIN. Warning signs of HEART ATTACK  Call 911 if you have these symptoms    · Chest discomfort. Most heart attacks involve discomfort in the center of the chest that lasts more than a few minutes, or that goes away and comes back. It can feel like uncomfortable pressure, squeezing, fullness, or pain. · Discomfort in other areas of the upper body. Symptoms can include pain or discomfort in one or both        Arms, the back, neck, jaw, or stomach. ·  Shortness of breath with or without chest discomfort. · Other signs may include breaking out in a cold sweat, nausea, or lightheadedness    Don't wait more than five minutes to call 911 - MINUTES MATTER! Fast action can save your life. Calling 911 is almost always the fastest way to get lifesaving treatment.   Emergency Medical Services staff can begin treatment when they arrive - up to an hour sooner than if someone gets to the hospital by car.      [address] EFFECT GUIDE    The [address] [place] EFFECT GUIDE was provided to the PATIENT AND CARE PROVIDER.   Information provided includes instruction about drug purpose and common side effects for the following medications:    · Motrin

## 2020-10-08 NOTE — BRIEF OP NOTE
Brief Postoperative Note    Patient: Bill Gray  YOB: 1995  MRN: [id number]    Date of Procedure: 10/8/2020     Pre-Op Diagnosis: MISSED AB 9wks     Post-Op Diagnosis: Same as preoperative diagnosis. Procedure(s):  SUCTION DILATATION AND CURETTAGE    Surgeon(s):  Aurea Izaguirre DO    Surgical Assistant: None    Anesthesia: MAC     Estimated Blood Loss (mL): less than 100   IVF 1L crystalloid   UOP 34HC    Complications: None    Specimens:   ID Type Source Tests Collected by Time Destination   1 : Products of Conception Fresh   Aurea Izaguirre DO 10/8/2020 1123 Pathology        Implants: * No implants in log *    Drains: * No LDAs found *    Findings: Anteverted uterus, cervix closed.  Tissue and clot evacuated from the uterus c/w POC     Electronically Signed by Alexandra Westfall DO on 10/8/2020 at 11:26 AM

## 2021-04-18 ENCOUNTER — APPOINTMENT (OUTPATIENT)
Dept: ULTRASOUND IMAGING | Age: 26
End: 2021-04-18
Attending: PHYSICIAN ASSISTANT
Payer: COMMERCIAL

## 2021-04-18 ENCOUNTER — HOSPITAL ENCOUNTER (EMERGENCY)
Age: 26
Discharge: HOME | End: 2021-04-18
Attending: STUDENT IN AN ORGANIZED HEALTH CARE EDUCATION/TRAINING PROGRAM
Payer: COMMERCIAL

## 2021-04-18 ENCOUNTER — HOSPITAL ENCOUNTER (EMERGENCY)
Age: 26
Discharge: HOME | End: 2021-04-18
Attending: EMERGENCY MEDICINE
Payer: COMMERCIAL

## 2021-04-18 VITALS
OXYGEN SATURATION: 97 % | TEMPERATURE: 99.5 F | HEART RATE: 94 BPM | DIASTOLIC BLOOD PRESSURE: 89 MMHG | HEIGHT: 67 IN | BODY MASS INDEX: 32.49 KG/M2 | RESPIRATION RATE: 22 BRPM | SYSTOLIC BLOOD PRESSURE: 129 MMHG | WEIGHT: 207 LBS

## 2021-04-18 VITALS
DIASTOLIC BLOOD PRESSURE: 85 MMHG | WEIGHT: 207 LBS | HEIGHT: 67 IN | TEMPERATURE: 98.4 F | RESPIRATION RATE: 16 BRPM | HEART RATE: 95 BPM | OXYGEN SATURATION: 99 % | BODY MASS INDEX: 32.49 KG/M2 | SYSTOLIC BLOOD PRESSURE: 129 MMHG

## 2021-04-18 DIAGNOSIS — N20.0 BILATERAL RENAL STONES: ICD-10-CM

## 2021-04-18 DIAGNOSIS — N23 RENAL COLIC ON LEFT SIDE: Primary | ICD-10-CM

## 2021-04-18 DIAGNOSIS — N30.00 ACUTE CYSTITIS WITHOUT HEMATURIA: ICD-10-CM

## 2021-04-18 DIAGNOSIS — N13.30 HYDRONEPHROSIS OF LEFT KIDNEY: Primary | ICD-10-CM

## 2021-04-18 LAB
ALBUMIN SERPL-MCNC: 4.1 G/DL (ref 3.5–5)
ALBUMIN SERPL-MCNC: 4.2 G/DL (ref 3.5–5)
ALBUMIN/GLOB SERPL: 1 {RATIO} (ref 1.1–2.2)
ALBUMIN/GLOB SERPL: 1.1 {RATIO} (ref 1.1–2.2)
ALP SERPL-CCNC: 76 U/L (ref 45–117)
ALP SERPL-CCNC: 76 U/L (ref 45–117)
ALT SERPL-CCNC: 18 U/L (ref 12–78)
ALT SERPL-CCNC: 19 U/L (ref 12–78)
ANION GAP SERPL CALC-SCNC: 5 MMOL/L (ref 5–15)
ANION GAP SERPL CALC-SCNC: 6 MMOL/L (ref 5–15)
APPEARANCE UR: ABNORMAL
AST SERPL-CCNC: 14 U/L (ref 15–37)
AST SERPL-CCNC: 16 U/L (ref 15–37)
BACTERIA URNS QL MICRO: ABNORMAL /HPF
BASOPHILS # BLD: 0 K/UL (ref 0–0.1)
BASOPHILS # BLD: 0 K/UL (ref 0–0.1)
BASOPHILS NFR BLD: 0 % (ref 0–1)
BASOPHILS NFR BLD: 0 % (ref 0–1)
BILIRUB SERPL-MCNC: 0.3 MG/DL (ref 0.2–1)
BILIRUB SERPL-MCNC: 0.4 MG/DL (ref 0.2–1)
BILIRUB UR QL: NEGATIVE
BUN SERPL-MCNC: 11 MG/DL (ref 6–20)
BUN SERPL-MCNC: 9 MG/DL (ref 6–20)
BUN/CREAT SERPL: 7 (ref 12–20)
BUN/CREAT SERPL: 9 (ref 12–20)
CALCIUM SERPL-MCNC: 8.7 MG/DL (ref 8.5–10.1)
CALCIUM SERPL-MCNC: 8.8 MG/DL (ref 8.5–10.1)
CHLORIDE SERPL-SCNC: 104 MMOL/L (ref 97–108)
CHLORIDE SERPL-SCNC: 105 MMOL/L (ref 97–108)
CO2 SERPL-SCNC: 24 MMOL/L (ref 21–32)
CO2 SERPL-SCNC: 26 MMOL/L (ref 21–32)
COLOR UR: ABNORMAL
COMMENT, HOLDF: NORMAL
COMMENT, HOLDF: NORMAL
CREAT SERPL-MCNC: 1.23 MG/DL (ref 0.55–1.02)
CREAT SERPL-MCNC: 1.24 MG/DL (ref 0.55–1.02)
DIFFERENTIAL METHOD BLD: ABNORMAL
DIFFERENTIAL METHOD BLD: ABNORMAL
EOSINOPHIL # BLD: 0 K/UL (ref 0–0.4)
EOSINOPHIL # BLD: 0.1 K/UL (ref 0–0.4)
EOSINOPHIL NFR BLD: 0 % (ref 0–7)
EOSINOPHIL NFR BLD: 1 % (ref 0–7)
EPITH CASTS URNS QL MICRO: ABNORMAL /LPF
ERYTHROCYTE [DISTWIDTH] IN BLOOD BY AUTOMATED COUNT: 13.5 % (ref 11.5–14.5)
ERYTHROCYTE [DISTWIDTH] IN BLOOD BY AUTOMATED COUNT: 13.5 % (ref 11.5–14.5)
GLOBULIN SER CALC-MCNC: 3.8 G/DL (ref 2–4)
GLOBULIN SER CALC-MCNC: 4.1 G/DL (ref 2–4)
GLUCOSE SERPL-MCNC: 102 MG/DL (ref 65–100)
GLUCOSE SERPL-MCNC: 121 MG/DL (ref 65–100)
GLUCOSE UR STRIP.AUTO-MCNC: NEGATIVE MG/DL
HCG SERPL-ACNC: 5 MIU/ML (ref 0–6)
HCG UR QL: NEGATIVE
HCT VFR BLD AUTO: 42.1 % (ref 35–47)
HCT VFR BLD AUTO: 43 % (ref 35–47)
HGB BLD-MCNC: 13.8 G/DL (ref 11.5–16)
HGB BLD-MCNC: 13.9 G/DL (ref 11.5–16)
HGB UR QL STRIP: ABNORMAL
IMM GRANULOCYTES # BLD AUTO: 0 K/UL (ref 0–0.04)
IMM GRANULOCYTES # BLD AUTO: 0 K/UL (ref 0–0.04)
IMM GRANULOCYTES NFR BLD AUTO: 0 % (ref 0–0.5)
IMM GRANULOCYTES NFR BLD AUTO: 0 % (ref 0–0.5)
KETONES UR QL STRIP.AUTO: NEGATIVE MG/DL
LEUKOCYTE ESTERASE UR QL STRIP.AUTO: ABNORMAL
LYMPHOCYTES # BLD: 1.3 K/UL (ref 0.8–3.5)
LYMPHOCYTES # BLD: 2.1 K/UL (ref 0.8–3.5)
LYMPHOCYTES NFR BLD: 12 % (ref 12–49)
LYMPHOCYTES NFR BLD: 26 % (ref 12–49)
MCH RBC QN AUTO: 28.2 PG (ref 26–34)
MCH RBC QN AUTO: 28.5 PG (ref 26–34)
MCHC RBC AUTO-ENTMCNC: 32.3 G/DL (ref 30–36.5)
MCHC RBC AUTO-ENTMCNC: 32.8 G/DL (ref 30–36.5)
MCV RBC AUTO: 85.9 FL (ref 80–99)
MCV RBC AUTO: 88.1 FL (ref 80–99)
MONOCYTES # BLD: 0.5 K/UL (ref 0–1)
MONOCYTES # BLD: 0.7 K/UL (ref 0–1)
MONOCYTES NFR BLD: 6 % (ref 5–13)
MONOCYTES NFR BLD: 6 % (ref 5–13)
NEUTS SEG # BLD: 5.2 K/UL (ref 1.8–8)
NEUTS SEG # BLD: 8.7 K/UL (ref 1.8–8)
NEUTS SEG NFR BLD: 67 % (ref 32–75)
NEUTS SEG NFR BLD: 82 % (ref 32–75)
NITRITE UR QL STRIP.AUTO: NEGATIVE
NRBC # BLD: 0 K/UL (ref 0–0.01)
NRBC # BLD: 0 K/UL (ref 0–0.01)
NRBC BLD-RTO: 0 PER 100 WBC
NRBC BLD-RTO: 0 PER 100 WBC
PH UR STRIP: 6 [PH] (ref 5–8)
PLATELET # BLD AUTO: 355 K/UL (ref 150–400)
PLATELET # BLD AUTO: 376 K/UL (ref 150–400)
PMV BLD AUTO: 8.8 FL (ref 8.9–12.9)
PMV BLD AUTO: 9.1 FL (ref 8.9–12.9)
POTASSIUM SERPL-SCNC: 3.8 MMOL/L (ref 3.5–5.1)
POTASSIUM SERPL-SCNC: 4.2 MMOL/L (ref 3.5–5.1)
PROT SERPL-MCNC: 8 G/DL (ref 6.4–8.2)
PROT SERPL-MCNC: 8.2 G/DL (ref 6.4–8.2)
PROT UR STRIP-MCNC: ABNORMAL MG/DL
RBC # BLD AUTO: 4.88 M/UL (ref 3.8–5.2)
RBC # BLD AUTO: 4.9 M/UL (ref 3.8–5.2)
RBC #/AREA URNS HPF: ABNORMAL /HPF (ref 0–5)
SAMPLES BEING HELD,HOLD: NORMAL
SAMPLES BEING HELD,HOLD: NORMAL
SODIUM SERPL-SCNC: 134 MMOL/L (ref 136–145)
SODIUM SERPL-SCNC: 136 MMOL/L (ref 136–145)
SP GR UR REFRACTOMETRY: 1.02 (ref 1–1.03)
UROBILINOGEN UR QL STRIP.AUTO: 0.2 EU/DL (ref 0.2–1)
WBC # BLD AUTO: 10.6 K/UL (ref 3.6–11)
WBC # BLD AUTO: 7.9 K/UL (ref 3.6–11)
WBC URNS QL MICRO: ABNORMAL /HPF (ref 0–4)

## 2021-04-18 PROCEDURE — 84702 CHORIONIC GONADOTROPIN TEST: CPT

## 2021-04-18 PROCEDURE — 80053 COMPREHEN METABOLIC PANEL: CPT

## 2021-04-18 PROCEDURE — 74011250636 HC RX REV CODE- 250/636: Performed by: PHYSICIAN ASSISTANT

## 2021-04-18 PROCEDURE — 85025 COMPLETE CBC W/AUTO DIFF WBC: CPT

## 2021-04-18 PROCEDURE — 36415 COLL VENOUS BLD VENIPUNCTURE: CPT

## 2021-04-18 PROCEDURE — 74011250636 HC RX REV CODE- 250/636: Performed by: STUDENT IN AN ORGANIZED HEALTH CARE EDUCATION/TRAINING PROGRAM

## 2021-04-18 PROCEDURE — 87086 URINE CULTURE/COLONY COUNT: CPT

## 2021-04-18 PROCEDURE — 81025 URINE PREGNANCY TEST: CPT

## 2021-04-18 PROCEDURE — 76856 US EXAM PELVIC COMPLETE: CPT

## 2021-04-18 PROCEDURE — 76770 US EXAM ABDO BACK WALL COMP: CPT

## 2021-04-18 PROCEDURE — 96375 TX/PRO/DX INJ NEW DRUG ADDON: CPT

## 2021-04-18 PROCEDURE — 74011000250 HC RX REV CODE- 250: Performed by: PHYSICIAN ASSISTANT

## 2021-04-18 PROCEDURE — 76830 TRANSVAGINAL US NON-OB: CPT

## 2021-04-18 PROCEDURE — 99285 EMERGENCY DEPT VISIT HI MDM: CPT

## 2021-04-18 PROCEDURE — 96374 THER/PROPH/DIAG INJ IV PUSH: CPT

## 2021-04-18 PROCEDURE — 96376 TX/PRO/DX INJ SAME DRUG ADON: CPT

## 2021-04-18 PROCEDURE — 81001 URINALYSIS AUTO W/SCOPE: CPT

## 2021-04-18 PROCEDURE — 99284 EMERGENCY DEPT VISIT MOD MDM: CPT

## 2021-04-18 RX ORDER — CEPHALEXIN 500 MG/1
500 CAPSULE ORAL 3 TIMES DAILY
Qty: 27 CAP | Refills: 0 | Status: SHIPPED | OUTPATIENT
Start: 2021-04-19 | End: 2021-04-28

## 2021-04-18 RX ORDER — ONDANSETRON 2 MG/ML
4 INJECTION INTRAMUSCULAR; INTRAVENOUS
Status: COMPLETED | OUTPATIENT
Start: 2021-04-18 | End: 2021-04-18

## 2021-04-18 RX ORDER — ONDANSETRON 4 MG/1
4 TABLET, ORALLY DISINTEGRATING ORAL
Qty: 10 TAB | Refills: 0 | OUTPATIENT
Start: 2021-04-18 | End: 2021-06-11

## 2021-04-18 RX ORDER — METOCLOPRAMIDE HYDROCHLORIDE 5 MG/ML
10 INJECTION INTRAMUSCULAR; INTRAVENOUS
Status: COMPLETED | OUTPATIENT
Start: 2021-04-18 | End: 2021-04-18

## 2021-04-18 RX ORDER — FENTANYL CITRATE 50 UG/ML
50 INJECTION, SOLUTION INTRAMUSCULAR; INTRAVENOUS
Status: COMPLETED | OUTPATIENT
Start: 2021-04-18 | End: 2021-04-18

## 2021-04-18 RX ORDER — METOCLOPRAMIDE 5 MG/1
5 TABLET ORAL
Qty: 15 TAB | Refills: 0 | Status: SHIPPED | OUTPATIENT
Start: 2021-04-18 | End: 2021-04-25

## 2021-04-18 RX ORDER — HYDROCODONE BITARTRATE AND ACETAMINOPHEN 5; 325 MG/1; MG/1
1 TABLET ORAL
Qty: 10 TAB | Refills: 0 | Status: SHIPPED | OUTPATIENT
Start: 2021-04-18 | End: 2021-04-21

## 2021-04-18 RX ORDER — HYDROMORPHONE HYDROCHLORIDE 1 MG/ML
1 INJECTION, SOLUTION INTRAMUSCULAR; INTRAVENOUS; SUBCUTANEOUS ONCE
Status: COMPLETED | OUTPATIENT
Start: 2021-04-18 | End: 2021-04-18

## 2021-04-18 RX ORDER — TAMSULOSIN HYDROCHLORIDE 0.4 MG/1
0.4 CAPSULE ORAL DAILY
Status: DISCONTINUED | OUTPATIENT
Start: 2021-04-19 | End: 2021-04-18

## 2021-04-18 RX ORDER — MORPHINE SULFATE 10 MG/ML
6 INJECTION, SOLUTION INTRAMUSCULAR; INTRAVENOUS
Status: COMPLETED | OUTPATIENT
Start: 2021-04-18 | End: 2021-04-18

## 2021-04-18 RX ORDER — DIPHENHYDRAMINE HYDROCHLORIDE 50 MG/ML
25 INJECTION, SOLUTION INTRAMUSCULAR; INTRAVENOUS ONCE
Status: COMPLETED | OUTPATIENT
Start: 2021-04-18 | End: 2021-04-18

## 2021-04-18 RX ADMIN — DIPHENHYDRAMINE HYDROCHLORIDE 25 MG: 50 INJECTION, SOLUTION INTRAMUSCULAR; INTRAVENOUS at 21:16

## 2021-04-18 RX ADMIN — CEFTRIAXONE 1 G: 1 INJECTION, POWDER, FOR SOLUTION INTRAMUSCULAR; INTRAVENOUS at 12:25

## 2021-04-18 RX ADMIN — ONDANSETRON 4 MG: 2 INJECTION INTRAMUSCULAR; INTRAVENOUS at 13:09

## 2021-04-18 RX ADMIN — FENTANYL CITRATE 50 MCG: 50 INJECTION, SOLUTION INTRAMUSCULAR; INTRAVENOUS at 11:07

## 2021-04-18 RX ADMIN — HYDROMORPHONE HYDROCHLORIDE 1 MG: 1 INJECTION, SOLUTION INTRAMUSCULAR; INTRAVENOUS; SUBCUTANEOUS at 21:16

## 2021-04-18 RX ADMIN — SODIUM CHLORIDE 1000 ML: 9 INJECTION, SOLUTION INTRAVENOUS at 11:07

## 2021-04-18 RX ADMIN — SODIUM CHLORIDE 1000 ML: 9 INJECTION, SOLUTION INTRAVENOUS at 21:16

## 2021-04-18 RX ADMIN — MORPHINE SULFATE 6 MG: 10 INJECTION INTRAVENOUS at 12:25

## 2021-04-18 RX ADMIN — ONDANSETRON 4 MG: 2 INJECTION INTRAMUSCULAR; INTRAVENOUS at 11:07

## 2021-04-18 RX ADMIN — METOCLOPRAMIDE 10 MG: 5 INJECTION, SOLUTION INTRAMUSCULAR; INTRAVENOUS at 21:16

## 2021-04-18 NOTE — ED PROVIDER NOTES
Tari Wilson is a 30 y.o. female with past medical history notable for ADHD, left ankle fracture, asthma, depression, bilateral nonobstructing nephrolithiasis with history of obstructing ureteral stones followed by urology, has an appointment tomorrow afternoon with [facility] presenting with recurrent vomiting after she was discharged earlier this afternoon from the emergency department. She states she felt better at the time of discharge but then shortly felt nausea and vomiting after she drank some water in the ED. She vomited on the way home. She called urology recommended she return to the emergency department. She states she has not been able to take any pain medication due to the vomiting. She did try to take Zofran but symptoms were refractory. Past Medical History:   Diagnosis Date    ADHD (attention deficit hyperactivity disorder) 2008    Dr. Rhiannon Blair Ankle fracture, left     4x    Asthma attack     Depression, major 8/2013    Dr. Liliam Zarate allergy 2010    beef (severe), tomoato (severe), others. Dr. Richard Horta GERD (gastroesophageal reflux disease)     IUD (intrauterine device) in place 7/2014    Dr. Akil Stahl    Nephrolithiasis     multiple     Panic anxiety syndrome 8/2013    Dr. Rhiannon Blair Pap smear for cervical cancer screening     Dr Akil Stahl       Past Surgical History:   Procedure Laterality Date    HX TONSIL AND ADENOIDECTOMY      LITHOTRIPSY  3 total         No family history on file.     Social History     Socioeconomic History    Marital status:      Spouse name: Not on file    Number of children: Not on file    Years of education: Not on file    Highest education level: Not on file   Occupational History    Not on file   Social Needs    Financial resource strain: Not on file    Food insecurity     Worry: Not on file     Inability: Not on file    Transportation needs     Medical: Not on file     Non-medical: Not on file   Tobacco Use    Smoking status: Never Smoker    Smokeless tobacco: Never Used   Substance and Sexual Activity    Alcohol use: No    Drug use: No    Sexual activity: Yes   Lifestyle    Physical activity     Days per week: Not on file     Minutes per session: Not on file    Stress: Not on file   Relationships    Social connections     Talks on phone: Not on file     Gets together: Not on file     Attends [employer] service: Not on file     Active member of club or organization: Not on file     Attends meetings of clubs or organizations: Not on file     Relationship status: Not on file    Intimate partner violence     Fear of current or ex partner: Not on file     Emotionally abused: Not on file     Physically abused: Not on file     Forced sexual activity: Not on file   Other Topics Concern    Not on file   Social History Narrative    Not on file         ALLERGIES: Bactrim [sulfamethoprim ds], Beef containing products, Griseofulvin, Macrobid [nitrofurantoin monohyd/m-cryst], and Tomato    Review of Systems   Constitutional: Negative for chills and fever. Eyes: Negative for photophobia. Respiratory: Negative for shortness of breath. Cardiovascular: Negative for chest pain. Gastrointestinal: Negative for abdominal pain. Genitourinary: Positive for flank pain. Negative for dysuria and pelvic pain. Musculoskeletal: Negative for back pain. Neurological: Negative for speech difficulty, light-headedness and headaches. Psychiatric/Behavioral: Negative for confusion. All other systems reviewed and are negative. Vitals:    04/18/21 1819   BP: 132/87   Pulse: 88   Resp: 16   Temp: 97.1 °F (36.2 °C)   SpO2: 97%   Weight: 93.9 kg (207 lb)   Height: 5' 7\" (1.702 m)            Physical Exam  Constitutional:       General: She is not in acute distress. Comments: Uncomfortable appearing   HENT:      Head: Normocephalic. Eyes:      Pupils: Pupils are equal, round, and reactive to light.    Pulmonary:      Effort: Pulmonary effort is normal.   Abdominal:      General: There is no distension. Tenderness: There is abdominal tenderness ( luq). There is left CVA tenderness. Musculoskeletal:      Right lower leg: No edema. Left lower leg: No edema. Comments: Left flank pain    Skin:     General: Skin is warm. Capillary Refill: Capillary refill takes less than 2 seconds. Neurological:      Mental Status: She is alert. Psychiatric:         Behavior: Behavior normal.          MDM  Number of Diagnoses or Management Options      Assessment:  30 y.o. Female with presenting with flank pain. She was seen earlier today, presented again because she had recurrent vomiting. She is now feeling much better than she did after discharge the first time. Labs are stable. To follow-up tomorrow. Given that she received artificial insemination recently and may be pregnant we will hold off on imaging at this time given that she does not have any emergent need.     ED Course as of 2235   Sun  EKNormal sinus rhythm, ventricular rate 61, normal axis, no ST elevation or depression.    [NS]      ED Course User Index  [NS] Jesse Andino MD       Procedures

## 2021-04-18 NOTE — DISCHARGE INSTRUCTIONS
Call [phone], [facility] urology stone hotline for an appointment for tomorrow. Return to the emergency room if fever vomiting or worsening pain develops. You received an IV dose of antibiotic (Rocephin) which lasts 24 hours so start the Keflex antibiotic tomorrow morning, on 4/19/21.

## 2021-04-18 NOTE — ED TRIAGE NOTES
Pt here for left flank pain and N/V. Seen here earlier today. Pt called kidney stone hotline and was told to come back to ED due to uncontrolled pain and vomiting. Has appt tomorrow afternoon with urology.

## 2021-04-18 NOTE — ED PROVIDER NOTES
Trace Hemphill is a 30 y.o. female with PMH of kidney stones since age 13 presents to emergency room ambulatory for evaluation of L flank pain that awoke her at 0400. States urinary urgency, decreased uop today. + dry heaving and nausea. Denies F/C, diarrhea. On progesterone suppositories once a night. Bryant Gerardo- 4/10/21, does not get a test for 6 days. PCP: Chely Okeefe MD  Urologist: [facility], last stone was 3/2021    Surgical hx- lithotripsy  Social hx- no ETOH    The patient has no other complaints at this time. Past Medical History:   Diagnosis Date    ADHD (attention deficit hyperactivity disorder) 2008    Dr. Lowell Javed Ankle fracture, left     4x    Asthma attack     Depression, major 8/2013    Dr. Cecilia Valverde allergy 2010    beef (severe), tomoato (severe), others. Dr. Edel Wolf GERD (gastroesophageal reflux disease)     IUD (intrauterine device) in place 7/2014    Dr. Rosario Ingram    Nephrolithiasis     multiple     Panic anxiety syndrome 8/2013    Dr. Lowell Javed Pap smear for cervical cancer screening     Dr Rosario Ingram       Past Surgical History:   Procedure Laterality Date    HX TONSIL AND ADENOIDECTOMY      LITHOTRIPSY  3 total         No family history on file.     Social History     Socioeconomic History    Marital status:      Spouse name: Not on file    Number of children: Not on file    Years of education: Not on file    Highest education level: Not on file   Occupational History    Not on file   Social Needs    Financial resource strain: Not on file    Food insecurity     Worry: Not on file     Inability: Not on file    Transportation needs     Medical: Not on file     Non-medical: Not on file   Tobacco Use    Smoking status: Never Smoker    Smokeless tobacco: Never Used   Substance and Sexual Activity    Alcohol use: No    Drug use: No    Sexual activity: Yes   Lifestyle    Physical activity     Days per week: Not on file     Minutes per session: Not on file    Stress: Not on file   Relationships    Social connections     Talks on phone: Not on file     Gets together: Not on file     Attends Quaker service: Not on file     Active member of club or organization: Not on file     Attends meetings of clubs or organizations: Not on file     Relationship status: Not on file    Intimate partner violence     Fear of current or ex partner: Not on file     Emotionally abused: Not on file     Physically abused: Not on file     Forced sexual activity: Not on file   Other Topics Concern    Not on file   Social History Narrative    Not on file         ALLERGIES: Bactrim [sulfamethoprim ds], Beef containing products, Griseofulvin, Macrobid [nitrofurantoin monohyd/m-cryst], and Tomato    Review of Systems   Constitutional: Negative. Negative for activity change, chills, fatigue and unexpected weight change. HENT: Negative for trouble swallowing. Respiratory: Negative for cough, chest tightness, shortness of breath and wheezing. Cardiovascular: Negative. Negative for chest pain and palpitations. Gastrointestinal: Negative for abdominal pain, diarrhea, nausea and vomiting. Genitourinary: Positive for flank pain (L flank pain). Negative for dysuria, frequency and hematuria. Musculoskeletal: Negative for arthralgias, back pain, neck pain and neck stiffness. Skin: Negative. Negative for color change and rash. Neurological: Negative. Negative for dizziness, numbness and headaches. All other systems reviewed and are negative. Vitals:    04/18/21 0908   BP: (!) 163/117   Pulse: 94   Resp: 22   Temp: 99.5 °F (37.5 °C)   SpO2: 97%   Weight: 93.9 kg (207 lb)   Height: 5' 7\" (1.702 m)            Physical Exam  Vitals signs and nursing note reviewed. Constitutional:       General: She is not in acute distress. Appearance: Normal appearance. She is well-developed. She is not toxic-appearing or diaphoretic.       Comments: AA female   HENT: Head: Normocephalic and atraumatic. Eyes:      General:         Right eye: No discharge. Left eye: No discharge. Conjunctiva/sclera: Conjunctivae normal.      Pupils: Pupils are equal, round, and reactive to light. Neck:      Musculoskeletal: Full passive range of motion without pain and normal range of motion. Trachea: No tracheal tenderness. Cardiovascular:      Rate and Rhythm: Normal rate and regular rhythm. Pulses: Normal pulses. Heart sounds: Normal heart sounds. No murmur. No friction rub. No gallop. Pulmonary:      Effort: Pulmonary effort is normal. No respiratory distress. Breath sounds: Normal breath sounds. No wheezing or rales. Chest:      Chest wall: No tenderness. Abdominal:      General: Bowel sounds are normal. There is no distension. Palpations: Abdomen is soft. There is no mass. Tenderness: There is no abdominal tenderness. There is no right CVA tenderness, left CVA tenderness, guarding or rebound. Hernia: No hernia is present. Musculoskeletal: Normal range of motion. General: No tenderness. Skin:     General: Skin is warm and dry. Capillary Refill: Capillary refill takes less than 2 seconds. Findings: No abrasion, erythema or rash. Neurological:      General: No focal deficit present. Mental Status: She is alert and oriented to person, place, and time. Cranial Nerves: No cranial nerve deficit. Sensory: No sensory deficit.       Coordination: Coordination normal.   Psychiatric:         Speech: Speech normal.         Behavior: Behavior normal.          MDM  Number of Diagnoses or Management Options  Diagnosis management comments:   Ddx: kidney stone, UTI, pyelo       Amount and/or Complexity of Data Reviewed  Clinical lab tests: ordered and reviewed  Tests in the radiology section of CPT®: reviewed and ordered  Review and summarize past medical records: yes  Discuss the patient with other providers: yes    Patient Progress  Patient progress: stable         Procedures    I discussed patient's PMH, exam findings as well as careplan with the ER attending who agrees with care plan. Karen Winkler PA-C    1:53 PM  Patient has been reassessed and feel much better. She has tolerated p.o., no recurrence of vomiting. Rocephin given here, urine culture sent. Will start on Keflex, encourage follow-up with urology as this likely is a kidney stone not seen on ultrasound with her history and states symptoms feel similar to this. She also follow-up with her OB, advised to notify of the ED visit tomorrow to discuss that she needs to be seen sooner than scheduled. Return precautions discussed. DISCHARGE NOTE:  1:54 PM  The patient has been re-evaluated and feeling much better and are stable for discharge. All available radiology and laboratory results have been reviewed with patient and/or available family. Patient and/or family verbally conveyed their understanding and agreement of the patient's signs, symptoms, diagnosis, treatment and prognosis and additionally agree to follow-up as recommended in the discharge instructions or to return to the Emergency Department should their condition change or worsen prior to their follow-up appointment. All questions have been answered and patient and/or available family express understanding. LABORATORY RESULTS:  Recent Results (from the past 24 hour(s))   SAMPLES BEING HELD    Collection Time: 04/18/21  9:22 AM   Result Value Ref Range    SAMPLES BEING HELD 1BLUR, 1 RED, 1 DK GREEN     COMMENT        Add-on orders for these samples will be processed based on acceptable specimen integrity and analyte stability, which may vary by analyte.    CBC WITH AUTOMATED DIFF    Collection Time: 04/18/21  9:22 AM   Result Value Ref Range    WBC 7.9 3.6 - 11.0 K/uL    RBC 4.90 3.80 - 5.20 M/uL    HGB 13.8 11.5 - 16.0 g/dL    HCT 42.1 35.0 - 47.0 %    MCV 85.9 80.0 - 99.0 FL MCH 28.2 26.0 - 34.0 PG    MCHC 32.8 30.0 - 36.5 g/dL    RDW 13.5 11.5 - 14.5 %    PLATELET 846 081 - 735 K/uL    MPV 8.8 (L) 8.9 - 12.9 FL    NRBC 0.0 0  WBC    ABSOLUTE NRBC 0.00 0.00 - 0.01 K/uL    NEUTROPHILS 67 32 - 75 %    LYMPHOCYTES 26 12 - 49 %    MONOCYTES 6 5 - 13 %    EOSINOPHILS 1 0 - 7 %    BASOPHILS 0 0 - 1 %    IMMATURE GRANULOCYTES 0 0.0 - 0.5 %    ABS. NEUTROPHILS 5.2 1.8 - 8.0 K/UL    ABS. LYMPHOCYTES 2.1 0.8 - 3.5 K/UL    ABS. MONOCYTES 0.5 0.0 - 1.0 K/UL    ABS. EOSINOPHILS 0.1 0.0 - 0.4 K/UL    ABS. BASOPHILS 0.0 0.0 - 0.1 K/UL    ABS. IMM. GRANS. 0.0 0.00 - 0.04 K/UL    DF AUTOMATED     METABOLIC PANEL, COMPREHENSIVE    Collection Time: 04/18/21  9:22 AM   Result Value Ref Range    Sodium 136 136 - 145 mmol/L    Potassium 3.8 3.5 - 5.1 mmol/L    Chloride 104 97 - 108 mmol/L    CO2 26 21 - 32 mmol/L    Anion gap 6 5 - 15 mmol/L    Glucose 121 (H) 65 - 100 mg/dL    BUN 11 6 - 20 MG/DL    Creatinine 1.24 (H) 0.55 - 1.02 MG/DL    BUN/Creatinine ratio 9 (L) 12 - 20      GFR est AA >60 >60 ml/min/1.73m2    GFR est non-AA 52 (L) >60 ml/min/1.73m2    Calcium 8.7 8.5 - 10.1 MG/DL    Bilirubin, total 0.3 0.2 - 1.0 MG/DL    ALT (SGPT) 19 12 - 78 U/L    AST (SGOT) 16 15 - 37 U/L    Alk.  phosphatase 76 45 - 117 U/L    Protein, total 8.0 6.4 - 8.2 g/dL    Albumin 4.2 3.5 - 5.0 g/dL    Globulin 3.8 2.0 - 4.0 g/dL    A-G Ratio 1.1 1.1 - 2.2     BETA HCG, QT    Collection Time: 04/18/21  9:22 AM   Result Value Ref Range    Beta HCG, QT 5 0 - 6 MIU/ML   URINALYSIS W/MICROSCOPIC    Collection Time: 04/18/21 11:00 AM   Result Value Ref Range    Color YELLOW/STRAW      Appearance CLOUDY (A) CLEAR      Specific gravity 1.018 1.003 - 1.030      pH (UA) 6.0 5.0 - 8.0      Protein TRACE (A) NEG mg/dL    Glucose Negative NEG mg/dL    Ketone Negative NEG mg/dL    Bilirubin Negative NEG      Blood SMALL (A) NEG      Urobilinogen 0.2 0.2 - 1.0 EU/dL    Nitrites Negative NEG      Leukocyte Esterase SMALL (A) NEG WBC 10-20 0 - 4 /hpf    RBC 5-10 0 - 5 /hpf    Epithelial cells FEW FEW /lpf    Bacteria 1+ (A) NEG /hpf   HCG URINE, QL. - POC    Collection Time: 04/18/21 11:00 AM   Result Value Ref Range    Pregnancy test,urine (POC) Negative NEG         IMAGING RESULTS:  Us Retroperitoneum Comp    Result Date: 4/18/2021  Mild left hydronephrosis. Bilateral nephrolithiasis. Us Transvaginal    Result Date: 4/18/2021  Normal pelvic ultrasound. Us Pelv Non Obs    Result Date: 4/18/2021  Normal pelvic ultrasound. MEDICATIONS GIVEN:  Medications   sodium chloride 0.9 % bolus infusion 1,000 mL (1,000 mL IntraVENous New Bag 4/18/21 1107)   ondansetron (ZOFRAN) injection 4 mg (4 mg IntraVENous Given 4/18/21 1107)   fentaNYL citrate (PF) injection 50 mcg (50 mcg IntraVENous Given 4/18/21 1107)   cefTRIAXone (ROCEPHIN) 1 g in sterile water (preservative free) 10 mL IV syringe (1 g IntraVENous Given 4/18/21 1225)   morphine 10 mg/mL injection 6 mg (6 mg IntraVENous Given 4/18/21 1225)   ondansetron (ZOFRAN) injection 4 mg (4 mg IntraVENous Given 4/18/21 1309)       IMPRESSION:  1. Hydronephrosis of left kidney    2. Bilateral renal stones    3. Acute cystitis without hematuria        PLAN:  Follow-up Information     Follow up With Specialties Details Why Contact Info    Morgan Antonio MD [facility] Medicine Schedule an appointment as soon as possible for a visit   [address] [address] [phone]      Bill Geiger MD Urology Schedule an appointment as soon as possible for a visit  your urologist tomorrow for follow-up. [address]  [phone]      [facility] EMERGENCY DEPT Emergency Medicine  If symptoms worsen- if fever, vomiting or worsening pain occur.  [address] [facility]  [phone]        Current Discharge Medication List      START taking these medications    Details   cephALEXin (Keflex) 500 mg capsule Take 1 Cap by mouth three (3) times daily for 9 days. Start on 4/19/21  Qty: 27 Cap, Refills: 0      HYDROcodone-acetaminophen (Norco) 5-325 mg per tablet Take 1 Tab by mouth every four (4) hours as needed for Pain for up to 3 days. Max Daily Amount: 6 Tabs. Qty: 10 Tab, Refills: 0    Associated Diagnoses: Hydronephrosis of left kidney; Bilateral renal stones; Acute cystitis without hematuria      !! ondansetron (Zofran ODT) 4 mg disintegrating tablet Take 1 Tab by mouth every eight (8) hours as needed for Nausea. Qty: 10 Tab, Refills: 0       !! - Potential duplicate medications found. Please discuss with provider. CONTINUE these medications which have NOT CHANGED    Details   !! ondansetron (Zofran ODT) 8 mg disintegrating tablet Take 1 Tab by mouth every eight (8) hours as needed for Nausea. Qty: 15 Tab, Refills: 0       !! - Potential duplicate medications found. Please discuss with provider.

## 2021-04-18 NOTE — ED NOTES
Patient does not appear to be in any acute distress/shows no evidence of clinical instability at this time. Provider has reviewed discharge instructions with the patient. The patient verbalized understanding instructions as well as need for follow up for any further symptoms.     Discharge papers given, education provided, and any questions answered. Patient discharged by provider.

## 2021-04-19 NOTE — ED NOTES
I have reviewed discharge instructions with the patient. Opportunity for questions and clarification was provided. The patient verbalized understanding. Patient discharged out of the ED via ambulation with no difficulty and in stable condition.

## 2021-04-21 LAB
BACTERIA SPEC CULT: NORMAL
CC UR VC: NORMAL
SERVICE CMNT-IMP: NORMAL

## 2021-06-10 ENCOUNTER — HOSPITAL ENCOUNTER (EMERGENCY)
Age: 26
Discharge: HOME | DRG: 690 | End: 2021-06-11
Attending: EMERGENCY MEDICINE
Payer: COMMERCIAL

## 2021-06-10 ENCOUNTER — APPOINTMENT (OUTPATIENT)
Dept: CT IMAGING | Age: 26
DRG: 690 | End: 2021-06-10
Attending: EMERGENCY MEDICINE
Payer: COMMERCIAL

## 2021-06-10 DIAGNOSIS — N13.30 HYDRONEPHROSIS OF RIGHT KIDNEY: ICD-10-CM

## 2021-06-10 DIAGNOSIS — N20.0 RIGHT KIDNEY STONE: Primary | ICD-10-CM

## 2021-06-10 LAB
BASOPHILS # BLD: 0 K/UL (ref 0–0.1)
BASOPHILS NFR BLD: 0 % (ref 0–1)
COMMENT, HOLDF: NORMAL
DIFFERENTIAL METHOD BLD: ABNORMAL
EOSINOPHIL # BLD: 0.1 K/UL (ref 0–0.4)
EOSINOPHIL NFR BLD: 1 % (ref 0–7)
ERYTHROCYTE [DISTWIDTH] IN BLOOD BY AUTOMATED COUNT: 13.6 % (ref 11.5–14.5)
HCG UR QL: NEGATIVE
HCT VFR BLD AUTO: 44.8 % (ref 35–47)
HGB BLD-MCNC: 14.5 G/DL (ref 11.5–16)
IMM GRANULOCYTES # BLD AUTO: 0.1 K/UL (ref 0–0.04)
IMM GRANULOCYTES NFR BLD AUTO: 1 % (ref 0–0.5)
LYMPHOCYTES # BLD: 2.5 K/UL (ref 0.8–3.5)
LYMPHOCYTES NFR BLD: 26 % (ref 12–49)
MCH RBC QN AUTO: 28.7 PG (ref 26–34)
MCHC RBC AUTO-ENTMCNC: 32.4 G/DL (ref 30–36.5)
MCV RBC AUTO: 88.7 FL (ref 80–99)
MONOCYTES # BLD: 0.7 K/UL (ref 0–1)
MONOCYTES NFR BLD: 7 % (ref 5–13)
NEUTS SEG # BLD: 6.4 K/UL (ref 1.8–8)
NEUTS SEG NFR BLD: 65 % (ref 32–75)
NRBC # BLD: 0 K/UL (ref 0–0.01)
NRBC BLD-RTO: 0 PER 100 WBC
PLATELET # BLD AUTO: 436 K/UL (ref 150–400)
PMV BLD AUTO: 9.4 FL (ref 8.9–12.9)
RBC # BLD AUTO: 5.05 M/UL (ref 3.8–5.2)
SAMPLES BEING HELD,HOLD: NORMAL
UR CULT HOLD, URHOLD: NORMAL
WBC # BLD AUTO: 9.8 K/UL (ref 3.6–11)

## 2021-06-10 PROCEDURE — 74176 CT ABD & PELVIS W/O CONTRAST: CPT

## 2021-06-10 PROCEDURE — 99283 EMERGENCY DEPT VISIT LOW MDM: CPT

## 2021-06-10 PROCEDURE — 99284 EMERGENCY DEPT VISIT MOD MDM: CPT

## 2021-06-10 PROCEDURE — 36415 COLL VENOUS BLD VENIPUNCTURE: CPT

## 2021-06-10 PROCEDURE — 81025 URINE PREGNANCY TEST: CPT

## 2021-06-10 PROCEDURE — 81001 URINALYSIS AUTO W/SCOPE: CPT

## 2021-06-10 PROCEDURE — 96374 THER/PROPH/DIAG INJ IV PUSH: CPT

## 2021-06-10 PROCEDURE — 74011250636 HC RX REV CODE- 250/636: Performed by: EMERGENCY MEDICINE

## 2021-06-10 PROCEDURE — 85025 COMPLETE CBC W/AUTO DIFF WBC: CPT

## 2021-06-10 PROCEDURE — 80048 BASIC METABOLIC PNL TOTAL CA: CPT

## 2021-06-10 PROCEDURE — 87086 URINE CULTURE/COLONY COUNT: CPT

## 2021-06-10 PROCEDURE — 96375 TX/PRO/DX INJ NEW DRUG ADDON: CPT

## 2021-06-10 RX ORDER — FENTANYL CITRATE 50 UG/ML
50 INJECTION, SOLUTION INTRAMUSCULAR; INTRAVENOUS
Status: DISCONTINUED | OUTPATIENT
Start: 2021-06-10 | End: 2021-06-11 | Stop reason: HOSPADM

## 2021-06-10 RX ORDER — ONDANSETRON 2 MG/ML
4 INJECTION INTRAMUSCULAR; INTRAVENOUS
Status: DISCONTINUED | OUTPATIENT
Start: 2021-06-10 | End: 2021-06-11 | Stop reason: HOSPADM

## 2021-06-10 RX ORDER — KETOROLAC TROMETHAMINE 30 MG/ML
15 INJECTION, SOLUTION INTRAMUSCULAR; INTRAVENOUS
Status: COMPLETED | OUTPATIENT
Start: 2021-06-10 | End: 2021-06-10

## 2021-06-10 RX ADMIN — ONDANSETRON 4 MG: 2 INJECTION INTRAMUSCULAR; INTRAVENOUS at 23:39

## 2021-06-10 RX ADMIN — KETOROLAC TROMETHAMINE 15 MG: 30 INJECTION, SOLUTION INTRAMUSCULAR; INTRAVENOUS at 23:28

## 2021-06-10 RX ADMIN — FENTANYL CITRATE 50 MCG: 50 INJECTION, SOLUTION INTRAMUSCULAR; INTRAVENOUS at 23:27

## 2021-06-11 VITALS
TEMPERATURE: 97.3 F | HEIGHT: 60 IN | WEIGHT: 210.54 LBS | SYSTOLIC BLOOD PRESSURE: 101 MMHG | BODY MASS INDEX: 41.33 KG/M2 | HEART RATE: 86 BPM | OXYGEN SATURATION: 96 % | RESPIRATION RATE: 17 BRPM | DIASTOLIC BLOOD PRESSURE: 66 MMHG

## 2021-06-11 LAB
ANION GAP SERPL CALC-SCNC: 6 MMOL/L (ref 5–15)
APPEARANCE UR: ABNORMAL
BACTERIA URNS QL MICRO: ABNORMAL /HPF
BILIRUB UR QL: NEGATIVE
BUN SERPL-MCNC: 10 MG/DL (ref 6–20)
BUN/CREAT SERPL: 10 (ref 12–20)
CALCIUM SERPL-MCNC: 9.1 MG/DL (ref 8.5–10.1)
CHLORIDE SERPL-SCNC: 102 MMOL/L (ref 97–108)
CO2 SERPL-SCNC: 26 MMOL/L (ref 21–32)
COLOR UR: ABNORMAL
CREAT SERPL-MCNC: 1.03 MG/DL (ref 0.55–1.02)
EPITH CASTS URNS QL MICRO: ABNORMAL /LPF
GLUCOSE SERPL-MCNC: 93 MG/DL (ref 65–100)
GLUCOSE UR STRIP.AUTO-MCNC: NEGATIVE MG/DL
HGB UR QL STRIP: ABNORMAL
KETONES UR QL STRIP.AUTO: NEGATIVE MG/DL
LEUKOCYTE ESTERASE UR QL STRIP.AUTO: ABNORMAL
NITRITE UR QL STRIP.AUTO: NEGATIVE
PH UR STRIP: 6 [PH] (ref 5–8)
POTASSIUM SERPL-SCNC: 3.8 MMOL/L (ref 3.5–5.1)
PROT UR STRIP-MCNC: NEGATIVE MG/DL
RBC #/AREA URNS HPF: ABNORMAL /HPF (ref 0–5)
SODIUM SERPL-SCNC: 134 MMOL/L (ref 136–145)
SP GR UR REFRACTOMETRY: 1.02 (ref 1–1.03)
UROBILINOGEN UR QL STRIP.AUTO: 0.2 EU/DL (ref 0.2–1)
WBC URNS QL MICRO: ABNORMAL /HPF (ref 0–4)

## 2021-06-11 RX ORDER — IBUPROFEN 800 MG/1
800 TABLET ORAL
Qty: 20 TABLET | Refills: 0 | Status: SHIPPED | OUTPATIENT
Start: 2021-06-11 | End: 2021-06-13

## 2021-06-11 RX ORDER — ACETAMINOPHEN 500 MG
1000 TABLET ORAL
Qty: 20 TABLET | Refills: 0 | Status: ON HOLD | OUTPATIENT
Start: 2021-06-11 | End: 2021-06-14

## 2021-06-11 RX ORDER — ONDANSETRON 4 MG/1
4 TABLET, ORALLY DISINTEGRATING ORAL
Qty: 12 TABLET | Refills: 0 | Status: SHIPPED | OUTPATIENT
Start: 2021-06-11 | End: 2021-06-13

## 2021-06-11 RX ORDER — CEPHALEXIN 500 MG/1
500 CAPSULE ORAL 3 TIMES DAILY
Qty: 21 CAPSULE | Refills: 0 | Status: SHIPPED | OUTPATIENT
Start: 2021-06-11 | End: 2021-06-13

## 2021-06-11 RX ORDER — OXYCODONE HYDROCHLORIDE 5 MG/1
5 TABLET ORAL
Qty: 6 TABLET | Refills: 0 | Status: SHIPPED | OUTPATIENT
Start: 2021-06-11 | End: 2021-06-13

## 2021-06-11 NOTE — ED TRIAGE NOTES
Patient reports right flank pain radiating to the RLQ with nausea. Reports Hx of kidney stones.      Patient denies painful urination, N/V,D.

## 2021-06-12 LAB
BACTERIA SPEC CULT: NORMAL
CC UR VC: NORMAL
SERVICE CMNT-IMP: NORMAL

## 2021-06-12 NOTE — ED PROVIDER NOTES
The history is provided by the patient. Flank Pain   This is a new problem. The current episode started 3 to 5 hours ago. The problem has not changed since onset. The problem occurs constantly. Patient reports not work related injury. The pain is associated with no known injury. The pain is present in the right side. The quality of the pain is described as aching and sharp. The pain is moderate. Associated symptoms include pelvic pain. Pertinent negatives include no fever and no dysuria. She has tried analgesics for the symptoms. The treatment provided no relief. Risk factors include history of kidney stones. Past Medical History:   Diagnosis Date    ADHD (attention deficit hyperactivity disorder) 2008    Dr. Reba Sanchez Ankle fracture, left     4x    Asthma attack     Depression, major 8/2013    Dr. Russell Tasha allergy 2010    beef (severe), tomoato (severe), others. Dr. Rita Raphael GERD (gastroesophageal reflux disease)     IUD (intrauterine device) in place 7/2014    Dr. Laina Junior    Nephrolithiasis     multiple     Panic anxiety syndrome 8/2013    Dr. Reba Sanchez Pap smear for cervical cancer screening     Dr Laina Junior       Past Surgical History:   Procedure Laterality Date    HX TONSIL AND ADENOIDECTOMY      LITHOTRIPSY  3 total         No family history on file.     Social History     Socioeconomic History    Marital status:      Spouse name: Not on file    Number of children: Not on file    Years of education: Not on file    Highest education level: Not on file   Occupational History    Not on file   Tobacco Use    Smoking status: Never Smoker    Smokeless tobacco: Never Used   Substance and Sexual Activity    Alcohol use: No    Drug use: No    Sexual activity: Yes   Other Topics Concern    Not on file   Social History Narrative    Not on file     Social Determinants of Health     Financial Resource Strain:     Difficulty of Paying Living Expenses:    Food Insecurity:     Worried About Running Out of Food in the Last Year:     [place] of Food in the Last Year:    Transportation Needs:     Lack of Transportation (Medical):  Lack of Transportation (Non-Medical):    Physical Activity:     Days of Exercise per Week:     Minutes of Exercise per Session:    Stress:     Feeling of Stress :    Social Connections:     Frequency of Communication with Friends and Family:     Frequency of Social Gatherings with Friends and Family:     Attends Taoist Services:     Active Member of Clubs or Organizations:     Attends Club or Organization Meetings:     Marital Status:    Intimate Partner Violence:     Fear of Current or Ex-Partner:     Emotionally Abused:     Physically Abused:     Sexually Abused: ALLERGIES: Bactrim [sulfamethoprim ds], Beef containing products, Griseofulvin, Macrobid [nitrofurantoin monohyd/m-cryst], and Tomato    Review of Systems   Constitutional: Negative for fever. Genitourinary: Positive for flank pain and pelvic pain. Negative for dysuria. All other systems reviewed and are negative. Vitals:    06/10/21 2242 06/11/21 0056   BP: 125/87 101/66   Pulse: 85 86   Resp: 16 17   Temp: 97.3 °F (36.3 °C)    SpO2: 98% 96%   Weight: 95.5 kg (210 lb 8.6 oz)    Height: 5' (1.524 m)             Physical Exam  Vitals and nursing note reviewed. Constitutional:       General: She is not in acute distress. Appearance: She is well-developed. HENT:      Head: Normocephalic and atraumatic. Eyes:      Conjunctiva/sclera: Conjunctivae normal.   Cardiovascular:      Rate and Rhythm: Normal rate and regular rhythm. Pulmonary:      Effort: Pulmonary effort is normal. No respiratory distress. Abdominal:      General: There is no distension. Palpations: Abdomen is soft. Tenderness: There is right CVA tenderness and guarding. There is no rebound. Negative signs include Buck's sign and McBurney's sign. Hernia: No hernia is present. Musculoskeletal:         General: No deformity. Normal range of motion. Cervical back: Neck supple. Skin:     General: Skin is warm and dry. Neurological:      Mental Status: She is alert. Cranial Nerves: No cranial nerve deficit. Psychiatric:         Behavior: Behavior normal.          MDM     30 y.o. female presents with right obstructing symptomatic kidney stone. Pain well controlled here. No end-organ dysfunction. Does have bacteriuria so will culture for outpatient follow up and place on keflex empirically. Well appearing and tolerating PO. Provided supportive care measures for treatment at home. Plan to follow up with urology and return precautions discussed for worsening or new concerning symptoms.       Procedures

## 2021-06-13 ENCOUNTER — HOSPITAL ENCOUNTER (INPATIENT)
Age: 26
LOS: 1 days | Discharge: HOME | DRG: 690 | End: 2021-06-14
Attending: EMERGENCY MEDICINE | Admitting: INTERNAL MEDICINE
Payer: COMMERCIAL

## 2021-06-13 DIAGNOSIS — R10.9 RIGHT FLANK PAIN: ICD-10-CM

## 2021-06-13 DIAGNOSIS — N20.0 KIDNEY STONE: Primary | ICD-10-CM

## 2021-06-13 PROBLEM — N13.30 HYDRONEPHROSIS, RIGHT: Status: ACTIVE | Noted: 2021-06-13

## 2021-06-13 LAB
ALBUMIN SERPL-MCNC: 3.7 G/DL (ref 3.5–5)
ALBUMIN/GLOB SERPL: 0.9 {RATIO} (ref 1.1–2.2)
ALP SERPL-CCNC: 72 U/L (ref 45–117)
ALT SERPL-CCNC: 16 U/L (ref 12–78)
ANION GAP SERPL CALC-SCNC: 4 MMOL/L (ref 5–15)
APPEARANCE UR: CLEAR
AST SERPL-CCNC: 15 U/L (ref 15–37)
BACTERIA URNS QL MICRO: NEGATIVE /HPF
BASOPHILS # BLD: 0 K/UL (ref 0–0.1)
BASOPHILS NFR BLD: 0 % (ref 0–1)
BILIRUB SERPL-MCNC: 0.4 MG/DL (ref 0.2–1)
BILIRUB UR QL: NEGATIVE
BUN SERPL-MCNC: 13 MG/DL (ref 6–20)
BUN/CREAT SERPL: 10 (ref 12–20)
CALCIUM SERPL-MCNC: 8.4 MG/DL (ref 8.5–10.1)
CHLORIDE SERPL-SCNC: 106 MMOL/L (ref 97–108)
CO2 SERPL-SCNC: 26 MMOL/L (ref 21–32)
COLOR UR: ABNORMAL
COMMENT, HOLDF: NORMAL
CREAT SERPL-MCNC: 1.26 MG/DL (ref 0.55–1.02)
DIFFERENTIAL METHOD BLD: NORMAL
EOSINOPHIL # BLD: 0.1 K/UL (ref 0–0.4)
EOSINOPHIL NFR BLD: 2 % (ref 0–7)
EPITH CASTS URNS QL MICRO: ABNORMAL /LPF
ERYTHROCYTE [DISTWIDTH] IN BLOOD BY AUTOMATED COUNT: 13.5 % (ref 11.5–14.5)
GLOBULIN SER CALC-MCNC: 3.9 G/DL (ref 2–4)
GLUCOSE SERPL-MCNC: 93 MG/DL (ref 65–100)
GLUCOSE UR STRIP.AUTO-MCNC: NEGATIVE MG/DL
HCT VFR BLD AUTO: 39.4 % (ref 35–47)
HGB BLD-MCNC: 12.7 G/DL (ref 11.5–16)
HGB UR QL STRIP: ABNORMAL
HYALINE CASTS URNS QL MICRO: ABNORMAL /LPF (ref 0–5)
IMM GRANULOCYTES # BLD AUTO: 0 K/UL (ref 0–0.04)
IMM GRANULOCYTES NFR BLD AUTO: 0 % (ref 0–0.5)
KETONES UR QL STRIP.AUTO: NEGATIVE MG/DL
LEUKOCYTE ESTERASE UR QL STRIP.AUTO: ABNORMAL
LIPASE SERPL-CCNC: 108 U/L (ref 73–393)
LYMPHOCYTES # BLD: 2.6 K/UL (ref 0.8–3.5)
LYMPHOCYTES NFR BLD: 40 % (ref 12–49)
MCH RBC QN AUTO: 28.6 PG (ref 26–34)
MCHC RBC AUTO-ENTMCNC: 32.2 G/DL (ref 30–36.5)
MCV RBC AUTO: 88.7 FL (ref 80–99)
MONOCYTES # BLD: 0.5 K/UL (ref 0–1)
MONOCYTES NFR BLD: 7 % (ref 5–13)
NEUTS SEG # BLD: 3.3 K/UL (ref 1.8–8)
NEUTS SEG NFR BLD: 51 % (ref 32–75)
NITRITE UR QL STRIP.AUTO: NEGATIVE
NRBC # BLD: 0 K/UL (ref 0–0.01)
NRBC BLD-RTO: 0 PER 100 WBC
PH UR STRIP: 6 [PH] (ref 5–8)
PLATELET # BLD AUTO: 359 K/UL (ref 150–400)
PMV BLD AUTO: 9 FL (ref 8.9–12.9)
POTASSIUM SERPL-SCNC: 3.7 MMOL/L (ref 3.5–5.1)
PROT SERPL-MCNC: 7.6 G/DL (ref 6.4–8.2)
PROT UR STRIP-MCNC: 30 MG/DL
RBC # BLD AUTO: 4.44 M/UL (ref 3.8–5.2)
RBC #/AREA URNS HPF: >100 /HPF (ref 0–5)
SAMPLES BEING HELD,HOLD: NORMAL
SODIUM SERPL-SCNC: 136 MMOL/L (ref 136–145)
SP GR UR REFRACTOMETRY: 1.02 (ref 1–1.03)
UR CULT HOLD, URHOLD: NORMAL
UROBILINOGEN UR QL STRIP.AUTO: 1 EU/DL (ref 0.2–1)
WBC # BLD AUTO: 6.5 K/UL (ref 3.6–11)
WBC URNS QL MICRO: ABNORMAL /HPF (ref 0–4)

## 2021-06-13 PROCEDURE — 83690 ASSAY OF LIPASE: CPT

## 2021-06-13 PROCEDURE — 74011250636 HC RX REV CODE- 250/636: Performed by: EMERGENCY MEDICINE

## 2021-06-13 PROCEDURE — 99284 EMERGENCY DEPT VISIT MOD MDM: CPT

## 2021-06-13 PROCEDURE — 85025 COMPLETE CBC W/AUTO DIFF WBC: CPT

## 2021-06-13 PROCEDURE — 96375 TX/PRO/DX INJ NEW DRUG ADDON: CPT

## 2021-06-13 PROCEDURE — 65270000029 HC RM PRIVATE

## 2021-06-13 PROCEDURE — 80053 COMPREHEN METABOLIC PANEL: CPT

## 2021-06-13 PROCEDURE — 81001 URINALYSIS AUTO W/SCOPE: CPT

## 2021-06-13 PROCEDURE — 96374 THER/PROPH/DIAG INJ IV PUSH: CPT

## 2021-06-13 PROCEDURE — 99283 EMERGENCY DEPT VISIT LOW MDM: CPT

## 2021-06-13 PROCEDURE — 36415 COLL VENOUS BLD VENIPUNCTURE: CPT

## 2021-06-13 RX ORDER — HYDROMORPHONE HYDROCHLORIDE 1 MG/ML
1 INJECTION, SOLUTION INTRAMUSCULAR; INTRAVENOUS; SUBCUTANEOUS ONCE
Status: COMPLETED | OUTPATIENT
Start: 2021-06-13 | End: 2021-06-13

## 2021-06-13 RX ORDER — OXYCODONE HYDROCHLORIDE 5 MG/1
5 TABLET ORAL
Status: DISCONTINUED | OUTPATIENT
Start: 2021-06-13 | End: 2021-06-14 | Stop reason: HOSPADM

## 2021-06-13 RX ORDER — FENTANYL CITRATE 50 UG/ML
50 INJECTION, SOLUTION INTRAMUSCULAR; INTRAVENOUS ONCE
Status: DISCONTINUED | OUTPATIENT
Start: 2021-06-13 | End: 2021-06-13

## 2021-06-13 RX ORDER — SODIUM CHLORIDE 9 MG/ML
100 INJECTION, SOLUTION INTRAVENOUS CONTINUOUS
Status: DISCONTINUED | OUTPATIENT
Start: 2021-06-13 | End: 2021-06-14 | Stop reason: HOSPADM

## 2021-06-13 RX ORDER — KETOROLAC TROMETHAMINE 30 MG/ML
15 INJECTION, SOLUTION INTRAMUSCULAR; INTRAVENOUS
Status: COMPLETED | OUTPATIENT
Start: 2021-06-13 | End: 2021-06-14

## 2021-06-13 RX ORDER — FENTANYL CITRATE 50 UG/ML
100 INJECTION, SOLUTION INTRAMUSCULAR; INTRAVENOUS ONCE
Status: COMPLETED | OUTPATIENT
Start: 2021-06-13 | End: 2021-06-14

## 2021-06-13 RX ORDER — PROCHLORPERAZINE EDISYLATE 5 MG/ML
10 INJECTION INTRAMUSCULAR; INTRAVENOUS
Status: DISCONTINUED | OUTPATIENT
Start: 2021-06-13 | End: 2021-06-14 | Stop reason: HOSPADM

## 2021-06-13 RX ORDER — HYDROMORPHONE HYDROCHLORIDE 1 MG/ML
1 INJECTION, SOLUTION INTRAMUSCULAR; INTRAVENOUS; SUBCUTANEOUS
Status: DISCONTINUED | OUTPATIENT
Start: 2021-06-13 | End: 2021-06-14 | Stop reason: HOSPADM

## 2021-06-13 RX ORDER — KETOROLAC TROMETHAMINE 30 MG/ML
15 INJECTION, SOLUTION INTRAMUSCULAR; INTRAVENOUS
Status: COMPLETED | OUTPATIENT
Start: 2021-06-13 | End: 2021-06-13

## 2021-06-13 RX ADMIN — HYDROMORPHONE HYDROCHLORIDE 1 MG: 1 INJECTION, SOLUTION INTRAMUSCULAR; INTRAVENOUS; SUBCUTANEOUS at 22:13

## 2021-06-13 RX ADMIN — SODIUM CHLORIDE 1000 ML: 9 INJECTION, SOLUTION INTRAVENOUS at 22:13

## 2021-06-13 RX ADMIN — KETOROLAC TROMETHAMINE 15 MG: 30 INJECTION, SOLUTION INTRAMUSCULAR; INTRAVENOUS at 22:14

## 2021-06-14 ENCOUNTER — APPOINTMENT (OUTPATIENT)
Dept: ULTRASOUND IMAGING | Age: 26
DRG: 690 | End: 2021-06-14
Attending: EMERGENCY MEDICINE
Payer: COMMERCIAL

## 2021-06-14 VITALS
DIASTOLIC BLOOD PRESSURE: 76 MMHG | WEIGHT: 211.86 LBS | SYSTOLIC BLOOD PRESSURE: 120 MMHG | RESPIRATION RATE: 22 BRPM | BODY MASS INDEX: 41.59 KG/M2 | HEART RATE: 83 BPM | OXYGEN SATURATION: 98 % | HEIGHT: 60 IN | TEMPERATURE: 98.2 F

## 2021-06-14 LAB
ALBUMIN SERPL-MCNC: 3.5 G/DL (ref 3.5–5)
ALBUMIN/GLOB SERPL: 0.9 {RATIO} (ref 1.1–2.2)
ALP SERPL-CCNC: 67 U/L (ref 45–117)
ALT SERPL-CCNC: 15 U/L (ref 12–78)
ANION GAP SERPL CALC-SCNC: 5 MMOL/L (ref 5–15)
AST SERPL-CCNC: 13 U/L (ref 15–37)
BILIRUB DIRECT SERPL-MCNC: <0.1 MG/DL (ref 0–0.2)
BILIRUB SERPL-MCNC: 0.3 MG/DL (ref 0.2–1)
BUN SERPL-MCNC: 13 MG/DL (ref 6–20)
BUN/CREAT SERPL: 10 (ref 12–20)
CALCIUM SERPL-MCNC: 7.9 MG/DL (ref 8.5–10.1)
CHLORIDE SERPL-SCNC: 107 MMOL/L (ref 97–108)
CO2 SERPL-SCNC: 26 MMOL/L (ref 21–32)
CREAT SERPL-MCNC: 1.34 MG/DL (ref 0.55–1.02)
ERYTHROCYTE [DISTWIDTH] IN BLOOD BY AUTOMATED COUNT: 13.2 % (ref 11.5–14.5)
EST. AVERAGE GLUCOSE BLD GHB EST-MCNC: 108 MG/DL
GLOBULIN SER CALC-MCNC: 3.7 G/DL (ref 2–4)
GLUCOSE SERPL-MCNC: 99 MG/DL (ref 65–100)
HBA1C MFR BLD: 5.4 % (ref 4–5.6)
HCT VFR BLD AUTO: 38.9 % (ref 35–47)
HGB BLD-MCNC: 12.3 G/DL (ref 11.5–16)
MAGNESIUM SERPL-MCNC: 1.8 MG/DL (ref 1.6–2.4)
MCH RBC QN AUTO: 28.3 PG (ref 26–34)
MCHC RBC AUTO-ENTMCNC: 31.6 G/DL (ref 30–36.5)
MCV RBC AUTO: 89.6 FL (ref 80–99)
NRBC # BLD: 0 K/UL (ref 0–0.01)
NRBC BLD-RTO: 0 PER 100 WBC
PHOSPHATE SERPL-MCNC: 3.8 MG/DL (ref 2.6–4.7)
PLATELET # BLD AUTO: 370 K/UL (ref 150–400)
PMV BLD AUTO: 9.2 FL (ref 8.9–12.9)
POTASSIUM SERPL-SCNC: 3.3 MMOL/L (ref 3.5–5.1)
PROT SERPL-MCNC: 7.2 G/DL (ref 6.4–8.2)
RBC # BLD AUTO: 4.34 M/UL (ref 3.8–5.2)
SODIUM SERPL-SCNC: 138 MMOL/L (ref 136–145)
WBC # BLD AUTO: 6.6 K/UL (ref 3.6–11)

## 2021-06-14 PROCEDURE — 84100 ASSAY OF PHOSPHORUS: CPT

## 2021-06-14 PROCEDURE — 74011250637 HC RX REV CODE- 250/637: Performed by: INTERNAL MEDICINE

## 2021-06-14 PROCEDURE — 74011000250 HC RX REV CODE- 250: Performed by: INTERNAL MEDICINE

## 2021-06-14 PROCEDURE — 87086 URINE CULTURE/COLONY COUNT: CPT

## 2021-06-14 PROCEDURE — 80076 HEPATIC FUNCTION PANEL: CPT

## 2021-06-14 PROCEDURE — 74011250636 HC RX REV CODE- 250/636: Performed by: EMERGENCY MEDICINE

## 2021-06-14 PROCEDURE — 83036 HEMOGLOBIN GLYCOSYLATED A1C: CPT

## 2021-06-14 PROCEDURE — 74011250636 HC RX REV CODE- 250/636: Performed by: INTERNAL MEDICINE

## 2021-06-14 PROCEDURE — 85027 COMPLETE CBC AUTOMATED: CPT

## 2021-06-14 PROCEDURE — 83735 ASSAY OF MAGNESIUM: CPT

## 2021-06-14 PROCEDURE — 36415 COLL VENOUS BLD VENIPUNCTURE: CPT

## 2021-06-14 PROCEDURE — 80048 BASIC METABOLIC PNL TOTAL CA: CPT

## 2021-06-14 PROCEDURE — 76770 US EXAM ABDO BACK WALL COMP: CPT

## 2021-06-14 RX ORDER — ENOXAPARIN SODIUM 100 MG/ML
40 INJECTION SUBCUTANEOUS EVERY 12 HOURS
Status: DISCONTINUED | OUTPATIENT
Start: 2021-06-14 | End: 2021-06-14 | Stop reason: HOSPADM

## 2021-06-14 RX ORDER — PROMETHAZINE HYDROCHLORIDE 25 MG/1
12.5 TABLET ORAL
Status: DISCONTINUED | OUTPATIENT
Start: 2021-06-14 | End: 2021-06-14 | Stop reason: HOSPADM

## 2021-06-14 RX ORDER — ACETAMINOPHEN 650 MG/1
650 SUPPOSITORY RECTAL
Status: DISCONTINUED | OUTPATIENT
Start: 2021-06-14 | End: 2021-06-14 | Stop reason: HOSPADM

## 2021-06-14 RX ORDER — KETOROLAC TROMETHAMINE 10 MG/1
10 TABLET, FILM COATED ORAL
COMMUNITY
End: 2021-06-14

## 2021-06-14 RX ORDER — SODIUM CHLORIDE 0.9 % (FLUSH) 0.9 %
5-40 SYRINGE (ML) INJECTION EVERY 8 HOURS
Status: DISCONTINUED | OUTPATIENT
Start: 2021-06-14 | End: 2021-06-14 | Stop reason: HOSPADM

## 2021-06-14 RX ORDER — SODIUM CHLORIDE 9 MG/ML
25 INJECTION, SOLUTION INTRAVENOUS AS NEEDED
Status: DISCONTINUED | OUTPATIENT
Start: 2021-06-14 | End: 2021-06-14 | Stop reason: HOSPADM

## 2021-06-14 RX ORDER — TAMSULOSIN HYDROCHLORIDE 0.4 MG/1
0.4 CAPSULE ORAL DAILY
COMMUNITY

## 2021-06-14 RX ORDER — SODIUM CHLORIDE 0.9 % (FLUSH) 0.9 %
5-40 SYRINGE (ML) INJECTION AS NEEDED
Status: DISCONTINUED | OUTPATIENT
Start: 2021-06-14 | End: 2021-06-14 | Stop reason: HOSPADM

## 2021-06-14 RX ORDER — HYDROMORPHONE HYDROCHLORIDE 2 MG/1
2 TABLET ORAL
COMMUNITY

## 2021-06-14 RX ORDER — ALPRAZOLAM 0.5 MG/1
0.5 TABLET ORAL
Status: DISCONTINUED | OUTPATIENT
Start: 2021-06-14 | End: 2021-06-14 | Stop reason: HOSPADM

## 2021-06-14 RX ORDER — SERTRALINE HYDROCHLORIDE 50 MG/1
75 TABLET, FILM COATED ORAL EVERY EVENING
Status: DISCONTINUED | OUTPATIENT
Start: 2021-06-14 | End: 2021-06-14 | Stop reason: HOSPADM

## 2021-06-14 RX ORDER — FACIAL-BODY WIPES
10 EACH TOPICAL DAILY PRN
Status: DISCONTINUED | OUTPATIENT
Start: 2021-06-14 | End: 2021-06-14 | Stop reason: HOSPADM

## 2021-06-14 RX ORDER — PANTOPRAZOLE SODIUM 40 MG/1
40 TABLET, DELAYED RELEASE ORAL
Status: DISCONTINUED | OUTPATIENT
Start: 2021-06-14 | End: 2021-06-14 | Stop reason: HOSPADM

## 2021-06-14 RX ORDER — ACETAMINOPHEN 325 MG/1
650 TABLET ORAL
Status: DISCONTINUED | OUTPATIENT
Start: 2021-06-14 | End: 2021-06-14 | Stop reason: HOSPADM

## 2021-06-14 RX ORDER — ONDANSETRON 2 MG/ML
4 INJECTION INTRAMUSCULAR; INTRAVENOUS
Status: DISCONTINUED | OUTPATIENT
Start: 2021-06-14 | End: 2021-06-14 | Stop reason: HOSPADM

## 2021-06-14 RX ORDER — POTASSIUM CHLORIDE 750 MG/1
40 TABLET, FILM COATED, EXTENDED RELEASE ORAL
Status: COMPLETED | OUTPATIENT
Start: 2021-06-14 | End: 2021-06-14

## 2021-06-14 RX ADMIN — PROCHLORPERAZINE EDISYLATE 10 MG: 5 INJECTION INTRAMUSCULAR; INTRAVENOUS at 08:12

## 2021-06-14 RX ADMIN — PROMETHAZINE HYDROCHLORIDE 12.5 MG: 25 TABLET ORAL at 06:50

## 2021-06-14 RX ADMIN — ALPRAZOLAM 0.5 MG: 0.5 TABLET ORAL at 11:38

## 2021-06-14 RX ADMIN — SODIUM CHLORIDE 100 ML/HR: 9 INJECTION, SOLUTION INTRAVENOUS at 01:24

## 2021-06-14 RX ADMIN — POTASSIUM CHLORIDE 40 MEQ: 750 TABLET, EXTENDED RELEASE ORAL at 07:00

## 2021-06-14 RX ADMIN — FENTANYL CITRATE 100 MCG: 50 INJECTION, SOLUTION INTRAMUSCULAR; INTRAVENOUS at 00:40

## 2021-06-14 RX ADMIN — KETOROLAC TROMETHAMINE 15 MG: 30 INJECTION, SOLUTION INTRAMUSCULAR; INTRAVENOUS at 00:40

## 2021-06-14 RX ADMIN — CEFTRIAXONE 1 G: 1 INJECTION, POWDER, FOR SOLUTION INTRAMUSCULAR; INTRAVENOUS at 00:40

## 2021-06-14 RX ADMIN — SODIUM CHLORIDE 100 ML/HR: 9 INJECTION, SOLUTION INTRAVENOUS at 00:41

## 2021-06-14 NOTE — ED TRIAGE NOTES
Patient arrives with complaint of right sided kidney stone, diagnosed on Thursday at [facility] ED. Reports right flank pain radiating to RLQ. Followed up with [facility] on Friday, and told stone had moved to ureter. Scheduled for lithotripsy on June 16. States has had severe pain starting at 630. States today has taken \"2 doses of toradol\", and 4 mg Dilaudid. Last dose of dilaudid was at 1900 this evening (2mg). Patient called [facility] hotline, and advised to come to ED for pain management. States pain now is 8/10. Denies SOB, chest pain, N/V/D, blood in urine.

## 2021-06-14 NOTE — PROGRESS NOTES
6/14/2021  12:32 PM    Reason for Admission:  Right flank pain   Assessment completed in person with patient. PMH: depression; GERD; kidney stones    At baseline, patient is iADLs and drives. She lives at home with her , Liza Clemons ([phone]), and does not utilize any DME. Her  will transport her. Patient is planning to have a lithotripsy procedure this Wednesday, June 16th, with her urologist.  Preferred Rx: [facility] [address]. RUR Score:       10%, low              Plan for utilizing home health:      Not anticipated    PCP: First and Last name:  Rick Wu MD     Name of Practice:    Are you a current patient: Yes/No: Yes   Approximate date of last visit: few months ago   Can you participate in a virtual visit with your PCP: Yes                    Current Advanced Directive/Advance Care Plan: Full Code      Healthcare Decision Maker:   Pierce Duff, , [phone]                    Transition of Care Plan:                    1. Medically stable with discharge order  2. Home with outpatient follow-up - scheduled appt with urology on 6/16  3. Scheduled PCP appt 6/16  4.  to transport  5. No further needs identified    Care Management Interventions  [facility] by CM: [facility] ([facility]  Palliative Care Criteria Met (RRAT>21 & CHF Dx)?: No  Mode of Transport at Discharge:  Other (see comment) ()  Transition of Care Consult (CM Consult): Discharge Planning  MyChart Signup: No  Discharge Durable Medical Equipment: No  Physical Therapy Consult: No  Occupational Therapy Consult: No  Speech Therapy Consult: No  Current Support Network: Lives with Spouse  Confirm Follow Up Transport: Family  The Plan for Transition of Care is Related to the Following Treatment Goals : R flank pain  Discharge Location  Discharge Placement: Home with outpatient services    Caio Romo RN

## 2021-06-14 NOTE — PROGRESS NOTES
0854: Pt vomiting, dry heaving. Phenergan admin @ [id number], pt just given compazine. Pt vomited 2 K+ pills, has 2 left to take. Pt anxious after compazine admin- reports \"panic attack\" sx. Pt ambulated to  to void, given cool cloth, coached on deep breathing. Pt returned to bed & continues to dry heave. MD Dina Randhawa made aware. 0900: Pt resting in bed quietly w/ lights dimmed, reports she feels better. 1029: Urology/MD Sparks rounded on pt, pt reporting that she would like to go home. Notified MD Dina Randhawa pt is still NPO, stated OK to place regular diet order & monitor how pt tolerates. 1429: Pt tolerated small amount of dry cereal, ginger ale for lunch. Pt reports she feels ready to be d/c- no further N&V. Notified MD Dina Randhawa, stated OK to d/c @ this time. I have reviewed discharge instructions with the patient. The patient verbalized understanding. Discharge medications reviewed with patient and appropriate educational materials and side effects teaching were provided.

## 2021-06-14 NOTE — PROGRESS NOTES
0700: Bedside and Verbal shift change report given to A Av RN (oncoming nurse) by Veronica Benavidez RN (offgoing nurse). Report included the following information SBAR, Kardex, Procedure Summary, Intake/Output, MAR, Accordion, Recent Results and Med Rec Status.

## 2021-06-14 NOTE — DISCHARGE SUMMARY
Hospitalist Discharge Summary     Patient ID:    Rohit Hein  [id number]  30 y.o.  1995    Admit date of service: 2021    Discharge date of service: 2021    Admission Diagnoses: Hydronephrosis, right [N13.30]    Chronic Diagnoses:    Problem List as of 2021 Date Reviewed: 2021        Codes Class Noted - Resolved    Hydronephrosis, right ICD-10-CM: N13.30  ICD-9-CM: 591  2021 - Present        Missed  ICD-10-CM: O02.1  ICD-9-CM: 632  10/8/2020 - Present        Food allergy ICD-10-CM: Z91.018  ICD-9-CM: V15.05  Unknown - Present    Overview Signed 3/20/2015 10:39 AM by Kylah Pappas MD     beef (severe), tomoato (severe)             Nephrolithiasis ICD-10-CM: N20.0  ICD-9-CM: 592.0  Unknown - Present        ADHD (attention deficit hyperactivity disorder) ICD-10-CM: F90.9  ICD-9-CM: 314.01  Unknown - Present    Overview Signed 3/20/2015 10:43 AM by MD Dr. Stephanie Helms             Ankle fracture, left ICD-10-CM: Q88.593M  ICD-9-CM: 824.8  Unknown - Present    Overview Signed 3/20/2015 10:43 AM by Kylah Pappas MD     4x             GERD (gastroesophageal reflux disease) ICD-10-CM: K21.9  ICD-9-CM: 530.81  Unknown - Present        Depression, major ICD-10-CM: F32.9  ICD-9-CM: 296.20  2013 - Present    Overview Signed 3/20/2015 10:43 AM by MD Dr. Stephanie Helms             Panic anxiety syndrome ICD-10-CM: F41.0  ICD-9-CM: 300.01  2013 - Present    Overview Signed 3/20/2015 10:43 AM by MD Dr. Stephanie Helms                   Discharge Medications:   Current Discharge Medication List      CONTINUE these medications which have NOT CHANGED    Details   tamsulosin (Flomax) 0.4 mg capsule Take 0.4 mg by mouth daily. HYDROmorphone (Dilaudid) 2 mg tablet Take 2 mg by mouth every four (4) hours as needed (kidney stone pain not controlled by ketorolac). sertraline (ZOLOFT) 50 mg tablet Take 75 mg by mouth nightly.       omeprazole (PRILOSEC) 20 mg capsule Take 20 mg by mouth daily. albuterol (PROVENTIL HFA, VENTOLIN HFA, PROAIR HFA) 90 mcg/actuation inhaler Take 2 Puffs by inhalation every four (4) hours as needed for Wheezing. Qty: 1 Inhaler, Refills: 0         STOP taking these medications       ketorolac (TORADOL) 10 mg tablet Comments:   Reason for Stopping: Follow up Care:    1. Cora Mendoza MD in 1-2 weeks  2. Urology     Diet:  Regular Diet    Disposition:  Home. Advanced Directive:    Discharge Exam:  See today's note. CONSULTATIONS: Urology    Significant Diagnostic Studies:   Recent Labs     06/14/21  0102 06/13/21  2211   WBC 6.6 6.5   HGB 12.3 12.7   HCT 38.9 39.4    359     Recent Labs     06/14/21 0102 06/13/21 2211    136   K 3.3* 3.7    106   CO2 26 26   BUN 13 13   CREA 1.34* 1.26*   GLU 99 93   CA 7.9* 8.4*   MG 1.8  --    PHOS 3.8  --      Recent Labs     06/14/21 0102 06/13/21 2211   ALT 15 16   AP 67 72   TBILI 0.3 0.4   TP 7.2 7.6   ALB 3.5 3.7   GLOB 3.7 3.9   LPSE  --  108     No results for input(s): INR, PTP, APTT, INREXT in the last 72 hours. No results for input(s): FE, TIBC, PSAT, FERR in the last 72 hours. No results for input(s): PH, PCO2, PO2 in the last 72 hours. No results for input(s): CPK, CKMB in the last 72 hours. No lab exists for component: TROPONINI  No results found for: Sanaz 57:   1.  R hydronephrosis/R Nephrolithiasis w/ obstruction/ hematuria: pt refused stent and wants to FU with her urologist on Wednesday for planned lithotripsy. continue  antiemetics, dilaudid prn severe pain. Evaluated by Urology. Pt has keflex already started as outpatient.       2. Renal insuff: due to above vs NSAID use.  stop taking NSAID.      3. Complicated UTI due to an obstructing kidney stone: UC sent on 6/10 is negative. Started on CTX IV. Continue home keflex.      4.  Depression: cont Zoloft      5. Hypokalemia.  Replete Pt wants to go home FU with urology for lithotripsy on 4/16/21    Discharged in stable condition.     Spent 35 minutes    Signed:  Julio Suarez MD  6/14/2021  12:14 PM

## 2021-06-14 NOTE — PROGRESS NOTES
[facility]: MED RECONCILIATION    Comments/Recommendations:   Pharmacist updated prior to admission medication list per telephone conversation with patient in room 529. Preferred pharmacy is [facility]. Allergies: Bactrim [sulfamethoprim ds], Beef containing products, Griseofulvin, Macrobid [nitrofurantoin monohyd/m-cryst], and Tomato    Prior to Admission Medications   Prescriptions Last Dose Informant Patient Reported? Taking? HYDROmorphone (Dilaudid) 2 mg tablet 6/13/2021 at 1910  Yes Yes   Sig: Take 2 mg by mouth every four (4) hours as needed (kidney stone pain not controlled by ketorolac). albuterol (PROVENTIL HFA, VENTOLIN HFA, PROAIR HFA) 90 mcg/actuation inhaler 5/14/2021 at Unknown time  No Yes   Sig: Take 2 Puffs by inhalation every four (4) hours as needed for Wheezing.   ketorolac (TORADOL) 10 mg tablet 6/13/2021 at 1840  Yes Yes   Sig: Take 10 mg by mouth every four (4) hours as needed (kidney stone pain). Kidney stone pain    omeprazole (PRILOSEC) 20 mg capsule 6/13/2021 at Unknown time  Yes Yes   Sig: Take 20 mg by mouth daily. sertraline (ZOLOFT) 50 mg tablet   Yes Yes   Sig: Take 75 mg by mouth nightly. tamsulosin (Flomax) 0.4 mg capsule 6/13/2021 at Unknown time  Yes Yes   Sig: Take 0.4 mg by mouth daily.         Won Arellano, Pharmacist

## 2021-06-14 NOTE — CONSULTS
New Urology Consult Note    Patient: Olive Bryant MRN: [id number]  SSN: [id number]    YOB: 1995  Age: 30 y.o. Sex: female            Assessment:     Olive Bryant is a 30 y.o. female with hx GERD and kidney stones admitted for 5.4mm right UPJ stone with hydronephrosis, elevated creatinine, intractable pain, UTI. Established patient of [facility], [facility] on 5/7/2021 for 6mm mid left ureteral stone. She was seen in the office on 6/11/2021 by Dr. Jef León for the 5.4mm right UPJ stone and scheduled for [facility] on 6/16/2021. Urine witth 6-10 bacteria and started on Keflex, culture pending. Recommendations:     1. Discussed treatment options with patient. With persistent pain, elevated creatinine and urine suspicious for infection I recommended ureteral stent placement prior to discharge. Patient has had a stent in the past and is adamant that she does not want to proceed. She would like to be discharged home and follow up with [facility] on 6/14/2021 as originally scheduled. She is aware she will need to return the the ED if she has worsening pain or nausea or develops fevers. 2. Can continue Keflex on discharge. Urine culture from office is pending. 3. Would avoid Toradol with worsening renal function. Discussed with patient. Thank you for this consult. Please contact [facility] with any further questions/concerns. Juvenal Ashby NP [phone]    History of Present Illness:     Chief Complaint:  pain    Olive Bryant is seen in consultation for reasons noted above at the request of Mary Holloway MD.    This is a 30 y.o. female with a history of stones. She was seen in the ED on 6/10/2021 for right flank pain radiating to her abdomen. CT demonstrated a 5.4mm right UPJ stone. She was seen in the office on 6/11/2021, Dr. Jef León, and scheduled for [facility] on 6/16/2021. Urine suspicious for infection, sent for culture and started on Keflex.  She had worsening pain yesterday after running out of Toradol and presented to the ED. Pain was improved with IV pain medication. She has had some vomiting this am but reports no nausea at this time. She denies any fevers or chills. She does reports some urinary frequency but denies dysuria or hematuria. Afebrile, other VSS  WBC 6.6  Creatinine 1.34 (1.26, 1.03)  UA 10-20 WBC, >100 RBC  Urine culture from 6/10/2021 with mixed ronny  CT from 6/11/2021 with 5.4 right UPJ stone and hydronephrosis. Images reviewed. Renal US from 6/14/2021 with dilation of the right renal collecting system. Subjective     Past Medical History  Past Medical History:   Diagnosis Date    ADHD (attention deficit hyperactivity disorder) 2008    Dr. Nguyen Irving Ankle fracture, left     4x    Asthma attack     Depression, major 8/2013    Dr. Tory Christiansen allergy 2010    beef (severe), tomoato (severe), others.  Dr. Ford Staff GERD (gastroesophageal reflux disease)     IUD (intrauterine device) in place 7/2014    Dr. Abelardo Calderon    Nephrolithiasis     multiple     Panic anxiety syndrome 8/2013    Dr. Nguyen Irving Pap smear for cervical cancer screening     Dr Abelardo Calderon       Past Surgical History:   Past Surgical History:   Procedure Laterality Date    HX TONSIL AND ADENOIDECTOMY      LITHOTRIPSY  3 total       Medication:  Current Facility-Administered Medications   Medication Dose Route Frequency Provider Last Rate Last Admin    pantoprazole (PROTONIX) tablet 40 mg  40 mg Oral ACB Estrada Alex MD        sertraline (ZOLOFT) tablet 75 mg  75 mg Oral QPM Estrada Alex MD        sodium chloride (NS) flush 5-40 mL  5-40 mL IntraVENous Q8H Estrada Alex MD        sodium chloride (NS) flush 5-40 mL  5-40 mL IntraVENous PRN Estrada Alex MD        0.9% sodium chloride infusion 25 mL  25 mL IntraVENous PRN Estrada Alex MD        acetaminophen (TYLENOL) tablet 650 mg  650 mg Oral Q6H PRN Estrada Alex MD        Or    acetaminophen (TYLENOL) suppository 650 mg  650 mg Rectal Q6H PRN Brianna Alex MD        bisacodyL (DULCOLAX) suppository 10 mg  10 mg Rectal DAILY PRN Brianna Alex MD        promethazine (PHENERGAN) tablet 12.5 mg  12.5 mg Oral Q6H PRN Estrada Alex MD   12.5 mg at 06/14/21 0650    Or    ondansetron (ZOFRAN) injection 4 mg  4 mg IntraVENous Q6H PRN Estrada Alex MD        enoxaparin (LOVENOX) injection 40 mg  40 mg SubCUTAneous Q12H Estrada Alex MD        cefTRIAXone (ROCEPHIN) 1 g in sterile water (preservative free) 10 mL IV syringe  1 g IntraVENous Q24H Estrada Alex  mL/hr at 06/14/21 0040 1 g at 06/14/21 0040    HYDROmorphone (DILAUDID) syringe 1 mg  1 mg IntraVENous Q4H PRN Estrada Alex MD        oxyCODONE IR (ROXICODONE) tablet 5 mg  5 mg Oral Q4H PRN Estrada Alex MD        prochlorperazine (COMPAZINE) injection 10 mg  10 mg IntraVENous Q6H PRN Estrada Alex MD   10 mg at 06/14/21 0812    0.9% sodium chloride infusion  100 mL/hr IntraVENous CONTINUOUS Estrada Alex  mL/hr at 06/14/21 0124 100 mL/hr at 06/14/21 0124       Allergies:   Allergies   Allergen Reactions    Bactrim [Sulfamethoprim Ds] Rash    Beef Containing Products Itching     Itchy throat or GI issues     Griseofulvin Rash    Macrobid [Nitrofurantoin Monohyd/M-Cryst] Nausea and Vomiting    Tomato Itching     Itchy throat or GI issues        Social History:  Social History     Tobacco Use    Smoking status: Never Smoker    Smokeless tobacco: Never Used   Substance Use Topics    Alcohol use: No    Drug use: No       Family History  Family History   Problem Relation Age of Onset    Hypertension Mother        Review of Systems  Unchanged from admitting provider note from 6/13/2021 other than HPI    Objective:     Vital signs in last 24 hours:  Visit Vitals  /87 (BP 1 Location: Left upper arm, BP Patient Position: At rest)   Pulse 72   Temp 97.9 °F (36.6 °C)   Resp 20   Ht 5' (1.524 m)   Wt 96.1 kg (211 lb 13.8 oz)   SpO2 100%   BMI 41.38 kg/m²       Intake/Output last 3 shifts:  Date 06/13/21 0700 - 06/14/21 0659 06/14/21 0700 - 06/15/21 0659   Shift 8509-3358 6785-0558 24 Hour Total 6048-3074 7050-1032 24 Hour Total   INTAKE   P.O.  0 0        P. O.  0 0      I.V.  1600(1.4) 1600(0.7)        Volume (0.9% sodium chloride infusion)  600 600        Volume (sodium chloride 0.9 % bolus infusion 1,000 mL)  1000 1000      Shift Total(mL/kg)  1600(16.6) 1600(16.6)      OUTPUT   Urine           Urine Occurrence(s)  3 x 3 x      Emesis/NG output           Emesis Occurrence(s)    2 x  2 x   Shift Total(mL/kg)         NET  1600 1600      Weight (kg)  96.1 96.1 96.1 96.1 96.1       Physical Exam  General Appearance: NAD, awake  HENT: atraumatic, normal ears  Cardiovascular: not tachycardic, no LE edema  Respiratory: no distress, room air  Abdomen: soft, no suprapubic fullness or tenderness  : mild right CVA tenderness  Extremities: moves all  Musculoskeletal: normal alignment of neck and head  Neuro: Appropriate, no focal neurological deficits  Mood/Affect: appropriate, A&O x 3    Lab/Imaging Review:       Most Recent Labs:  Lab Results   Component Value Date/Time    WBC 6.6 06/14/2021 01:02 AM    HGB 12.3 06/14/2021 01:02 AM    HCT 38.9 06/14/2021 01:02 AM    PLATELET 221 67/55/4604 01:02 AM    MCV 89.6 06/14/2021 01:02 AM        Lab Results   Component Value Date/Time    Sodium 138 06/14/2021 01:02 AM    Potassium 3.3 (L) 06/14/2021 01:02 AM    Chloride 107 06/14/2021 01:02 AM    CO2 26 06/14/2021 01:02 AM    Anion gap 5 06/14/2021 01:02 AM    Glucose 99 06/14/2021 01:02 AM    BUN 13 06/14/2021 01:02 AM    Creatinine 1.34 (H) 06/14/2021 01:02 AM    BUN/Creatinine ratio 10 (L) 06/14/2021 01:02 AM    GFR est AA 58 (L) 06/14/2021 01:02 AM    GFR est non-AA 47 (L) 06/14/2021 01:02 AM    Calcium 7.9 (L) 06/14/2021 01:02 AM    Bilirubin, total 0.3 06/14/2021 01:02 AM    Alk.  phosphatase 67 06/14/2021 01:02 AM    Protein, total 7.2 06/14/2021 01:02 AM    Albumin 3.5 06/14/2021 01:02 AM    Globulin 3.7 06/14/2021 01:02 AM    A-G Ratio 0.9 (L) 06/14/2021 01:02 AM    ALT (SGPT) 15 06/14/2021 01:02 AM        No results found for: PSA, PSA2, PSAR1, Marino Knock, PSAR3, [id number], [id number], 35921, PSAEXT     COAGS:  No results found for: APTT, PTP, INR, INREXT    Lab Results   Component Value Date/Time    Hemoglobin A1c 5.4 06/14/2021 01:02 AM        Lab Results   Component Value Date/Time    Troponin-I, Qt. <0.04 01/10/2018 01:13 AM          Urine/Blood Cultures:  Results     Procedure Component Value Units Date/Time    CULTURE, URINE [[id number]]     Order Status: Sent Specimen: Urine from Clean catch     URINE CULTURE HOLD SAMPLE [[id number]] Collected: 06/13/21 2211    Order Status: Completed Specimen: Urine from Serum Updated: 06/13/21 2217     Urine culture hold       Urine on hold in Microbiology dept for 2 days. If unpreserved urine is submitted, it cannot be used for addtional testing after 24 hours, recollection will be required. URINE CULTURE HOLD SAMPLE [[id number]] Collected: 06/10/21 2325    Order Status: Completed Specimen: Urine from Serum Updated: 06/10/21 2344     Urine culture hold       Urine on hold in Microbiology dept for 2 days. If unpreserved urine is submitted, it cannot be used for addtional testing after 24 hours, recollection will be required. CULTURE, URINE [[id number]] Collected: 06/10/21 2325    Order Status: Completed Specimen: Urine Updated: 06/12/21 0808     Special Requests: NO SPECIAL REQUESTS        Vining Count --        [id number]  COLONIES/mL       Culture result:       MIXED UROGENITAL MONIQUE ISOLATED                   IMAGING:  US RETROPERITONEUM COMP    Result Date: 6/14/2021  INDICATION: Flank pain. Exam: Bilateral renal ultrasound. FINDINGS: The right kidney measures 10.9 cm in sagittal length. The left kidney measures 10.6 cm in sagittal length. There is an 8 mm right renal calculus. There is mild dilatation of the right renal collecting system.  No renal cyst or mass is visualized. The abdominal aorta is of normal course and caliber. The aortic bifurcation is normal. The visualized IVC is normal. The urinary bladder is partially filled and grossly normal.     Mild dilatation of the right renal collecting system.            Signed By: Guerline Davis NP  - June 14, 2021

## 2021-06-14 NOTE — H&P
Kenroy Irwin kyle [place] [address]  [address] [facility], [address] [place] [address]  [phone]    Admission History and Physical      NAME:  Vaibhav Pyle   :   1995   MRN:  [id number]     PCP:  Estephania Carl MD     Date/Time of service:  2021  11:35 PM        Subjective:     CHIEF COMPLAINT: right flank pain     HISTORY OF PRESENT ILLNESS:     The patient is a 31 yo hx of depression, GERD, kidney stones, presented w/ R flank pain, R hydronephrosis, kidney stone w/ obstruction. The patient has been having R flank pain for 1 week. She was diagnosed with an obstructive kidney stone and was scheduled for a lithotripsy next Wednesday. However, patient's pain was 10/10 tonight and she came to the ED. Denied chest pain, SOB, fevers, chills, nausea, vomiting, diarrhea. In the ED, WBC was 6.5, Cr 1.26. Abd CT confirmed R hydronephrosis and obstructive kidney stone. Allergies   Allergen Reactions    Bactrim [Sulfamethoprim Ds] Rash    Beef Containing Products Itching     Itchy throat or GI issues     Griseofulvin Rash    Macrobid [Nitrofurantoin Monohyd/M-Cryst] Nausea and Vomiting    Tomato Itching     Itchy throat or GI issues        Prior to Admission medications    Medication Sig Start Date End Date Taking? Authorizing Provider   acetaminophen (TYLENOL) 500 mg tablet Take 2 Tablets by mouth every six (6) hours as needed for Pain or Fever. 21   Chante Anaya MD   sertraline (ZOLOFT) 50 mg tablet Take 75 mg by mouth daily. Provider, Historical   omeprazole (PRILOSEC) 20 mg capsule Take 20 mg by mouth daily. Provider, Historical   albuterol (PROVENTIL HFA, VENTOLIN HFA, PROAIR HFA) 90 mcg/actuation inhaler Take 2 Puffs by inhalation every four (4) hours as needed for Wheezing. 1/10/18   Cyndi Sandoval MD   inhalational spacing device (POCKET CHAMBER) 1 Each by Does Not Apply route as needed.  1/10/18   Cyndi Sandoval MD       Past Medical History:   Diagnosis Date    ADHD (attention deficit hyperactivity disorder) 2008    Dr. Huber Glass Ankle fracture, left     4x    Asthma attack     Depression, major 8/2013    Dr. Lucy Lewis allergy 2010    beef (severe), tomoato (severe), others.  Dr. Estrella Justice GERD (gastroesophageal reflux disease)     IUD (intrauterine device) in place 7/2014    Dr. Mikey Antony Nephrolithiasis     multiple     Panic anxiety syndrome 8/2013    Dr. Huber Glass Pap smear for cervical cancer screening     Dr Mercedes Nathan        Past Surgical History:   Procedure Laterality Date    HX TONSIL AND ADENOIDECTOMY      LITHOTRIPSY  3 total       Social History     Tobacco Use    Smoking status: Never Smoker    Smokeless tobacco: Never Used   Substance Use Topics    Alcohol use: No        Family History   Problem Relation Age of Onset    Hypertension Mother         Review of Systems:  (bold if positive, if negative)    Gen:  Eyes:  ENT:  CVS:  Pulm:  GI:  :  MS:  Pain,Skin:  Psych:  Endo:  Hem:  Renal:  Neuro:          Objective:      VITALS:    Vital signs reviewed; most recent are:    Visit Vitals  BP (!) 132/98   Pulse 81   Temp 97.5 °F (36.4 °C)   Resp 18   Ht 5' (1.524 m)   Wt 96.1 kg (211 lb 13.8 oz)   SpO2 96%   BMI 41.38 kg/m²     SpO2 Readings from Last 6 Encounters:   06/13/21 96%   06/11/21 96%   04/18/21 99%   04/18/21 97%   10/08/20 98%   08/16/20 96%        No intake or output data in the 24 hours ending 06/13/21 2335     Exam:     Physical Exam:    Gen:  Well-developed, well-nourished, morbidly obese, mild distress  HEENT:  Pink conjunctivae, PERRL, hearing intact to voice, moist mucous membranes  Neck:  Supple, without masses, thyroid non-tender  Resp:  No accessory muscle use, clear breath sounds without wheezes rales or rhonchi  Card:  No murmurs, normal S1, S2 without thrills, bruits or peripheral edema  Abd:  Soft, non-tender, non-distended, normoactive bowel sounds are present, +R CVA tenderness  Lymph:  No cervical adenopathy  Musc:  No cyanosis or clubbing  Skin:  No rashes  Neuro:  Cranial nerves 3-12 are grossly intact, follows commands appropriately  Psych:  Alert with good insight. Oriented to person, place, and time    Labs:    Recent Labs     06/13/21  2211   WBC 6.5   HGB 12.7   HCT 39.4        Recent Labs     06/13/21  2211      K 3.7      CO2 26   GLU 93   BUN 13   CREA 1.26*   CA 8.4*   ALB 3.7   TBILI 0.4   ALT 16     No results found for: GLUCPOC  No results for input(s): PH, PCO2, PO2, HCO3, FIO2 in the last 72 hours. No results for input(s): INR, INREXT in the last 72 hours. Radiology and EKG reviewed:   abd CT reviewed    **Old Records reviewed in [facility]**       Assessment/Plan:       Active Problems:    31 yo hx of depression, GERD, kidney stones, presented w/ R flank pain, R hydronephrosis, kidney stone w/ obstruction    1) R hydronephrosis/R Nephrolithiasis w/ obstruction: will keep NPO. Start IVF, IV abx, IV antiemetics, IV dilaudid prn severe pain. Consult Urology for stent    2) Renal insuff: due to above. Will monitor BMP closely    3) Complicated UTI due to an obstructing kidney stone: check urine Cx.   Start IV CTX    4) Depression: cont Zoloft     Risk of deterioration: high      Total time spent with patient: 79 Minutes **I personally saw and examined the patient during this time period**                 Care Plan discussed with: Patient, nursing     Discussed:  Care Plan    Prophylaxis:  Lovenox    Probable Disposition:  Home w/Family           ___________________________________________________    Attending Physician: Kellie Nicole MD

## 2021-06-14 NOTE — PROGRESS NOTES
Pharmacy Dosing Services: 6/14/21    Consult for Enoxaparin by Dr. Spencer Back made for this 30 y.o. female, for prophylaxis of  VTE. Wt Readings from Last 1 Encounters:   06/13/21 96.1 kg (211 lb 13.8 oz)       Ht Readings from Last 1 Encounters:   06/13/21 152.4 cm (60\")             Previous Dose Enoxaparin 40mg daily      Creatinine Clearance Estimated Creatinine Clearance: 69.6 mL/min (A) (based on SCr of 1.26 mg/dL (H)). Creatinine Lab Results   Component Value Date/Time    Creatinine 1.26 (H) 06/13/2021 10:11 PM         Platelet Lab Results   Component Value Date/Time    PLATELET 816 47/01/7120 10:11 PM        H/H Lab Results   Component Value Date/Time    HGB 12.7 06/13/2021 10:11 PM          Pharmacist made change to enoxaparin therapy based on:  [  ] Renal function: dose changed to:  [ X ] BMI: dose changed to: enoxaparin 40mg every 12 hours for BMI > 40    Pharmacy to automatically make dose adjustment for renal dysfunction (creatinine clearance less than 30 mL/min)  Pharmacy to automatically make dose adjustment for obesity (BMI greater than 40)  Pharmacy to make dose rounding adjustments per [facility] dose adjustment scale. Pharmacy to monitor patients progress. Will make dose adjustment as needed per changing renal function. Will communicate further recommendations regarding patients anticoagulation therapy with prescriber. Signed Marc Vieyra .  Contact information: [phone]

## 2021-06-14 NOTE — ED NOTES
TRANSFER - OUT REPORT:    Verbal report given to 5th floor RN (name) on Susy Llamas  being transferred to Bed 529 (unit) for routine progression of care       Report consisted of patients Situation, Background, Assessment and   Recommendations(SBAR). Information from the following report(s) SBAR, Kardex, ED Summary, [facility] and Recent Results was reviewed with the receiving nurse. Lines:   Peripheral IV 06/13/21 Left; Outer Antecubital (Active)   Site Assessment Clean, dry, & intact 06/13/21 2213   Phlebitis Assessment 0 06/13/21 2213   Infiltration Assessment 0 06/13/21 2213   Dressing Status New;Clean, dry, & intact 06/13/21 2213   Dressing Type Transparent 06/13/21 2213   Hub Color/Line Status Pink;Flushed;Patent 06/13/21 2213   Action Taken Blood drawn 06/13/21 2213        Opportunity for questions and clarification was provided.       Patient transported with:   Registered Nurse

## 2021-06-14 NOTE — PROGRESS NOTES
Kenroy Irwin [facility] [address]  [address] [facility] [place] [address] [place] [address]  [phone]      Medical Progress Note      NAME: Hermelinda Gamino   :  1995  MRM:  [id number]    Date of service: 2021  6:45 AM       Assessment and Plan:   1.  R hydronephrosis/R Nephrolithiasis w/ obstruction/ hematuria: pt refused stent and wants to FU with her urologist on Wednesday for planned lithotripsy. continue  IVF, IV abx, IV antiemetics, IV dilaudid prn severe pain. Evaluated by Urology. Pt has keflex already started as outpatient.      2. Renal insuff: due to above vs NSAID use. stop taking NSAID.      3. Complicated UTI due to an obstructing kidney stone: UC sent on 6/10 is negative. Started on CTX IV. Continue home keflex.      4.  Depression: cont Zoloft     5. Hypokalemia. Replete             Subjective:     Chief Complaint[de-identified] Patient was seen and examined as a follow up for RT hydronephrosis. Chart was reviewed. c/o N/V    ROS:  (bold if positive, if negative)    Tolerating PT  Tolerating Diet        Objective:     Last 24hrs VS reviewed since prior progress note.  Most recent are:    Visit Vitals  /84 (BP 1 Location: Right arm, BP Patient Position: At rest)   Pulse 85   Temp 98.4 °F (36.9 °C)   Resp 15   Ht 5' (1.524 m)   Wt 96.1 kg (211 lb 13.8 oz)   SpO2 96%   BMI 41.38 kg/m²     SpO2 Readings from Last 6 Encounters:   21 96%   21 96%   21 99%   21 97%   10/08/20 98%   20 96%            Intake/Output Summary (Last 24 hours) at 2021 0645  Last data filed at 2021 0641  Gross per 24 hour   Intake 1600 ml   Output    Net 1600 ml        Physical Exam:    Gen:  Well-developed, well-nourished, in no acute distress  HEENT:  Pink conjunctivae, PERRL, hearing intact to voice, moist mucous membranes  Neck:  Supple, without masses, thyroid non-tender  Resp:  No accessory muscle use, clear breath sounds without wheezes rales or rhonchi  Card:  No murmurs, normal S1, S2 without thrills, bruits or peripheral edema  Abd:  Soft, non-tender, non-distended, normoactive bowel sounds are present, no palpable organomegaly and no detectable hernias  Lymph:  No cervical or inguinal adenopathy  Musc:  No cyanosis or clubbing  Skin:  No rashes or ulcers, skin turgor is good  Neuro:  Cranial nerves are grossly intact, no focal motor weakness, follows commands appropriately  Psych:  Good insight, oriented to person, place and time, alert  __________________________________________________________________  Medications Reviewed: (see below)  Medications:     Current Facility-Administered Medications   Medication Dose Route Frequency    pantoprazole (PROTONIX) tablet 40 mg  40 mg Oral ACB    sertraline (ZOLOFT) tablet 75 mg  75 mg Oral QPM    sodium chloride (NS) flush 5-40 mL  5-40 mL IntraVENous Q8H    sodium chloride (NS) flush 5-40 mL  5-40 mL IntraVENous PRN    0.9% sodium chloride infusion 25 mL  25 mL IntraVENous PRN    acetaminophen (TYLENOL) tablet 650 mg  650 mg Oral Q6H PRN    Or    acetaminophen (TYLENOL) suppository 650 mg  650 mg Rectal Q6H PRN    bisacodyL (DULCOLAX) suppository 10 mg  10 mg Rectal DAILY PRN    promethazine (PHENERGAN) tablet 12.5 mg  12.5 mg Oral Q6H PRN    Or    ondansetron (ZOFRAN) injection 4 mg  4 mg IntraVENous Q6H PRN    enoxaparin (LOVENOX) injection 40 mg  40 mg SubCUTAneous Q12H    cefTRIAXone (ROCEPHIN) 1 g in sterile water (preservative free) 10 mL IV syringe  1 g IntraVENous Q24H    HYDROmorphone (DILAUDID) syringe 1 mg  1 mg IntraVENous Q4H PRN    oxyCODONE IR (ROXICODONE) tablet 5 mg  5 mg Oral Q4H PRN    prochlorperazine (COMPAZINE) injection 10 mg  10 mg IntraVENous Q6H PRN    0.9% sodium chloride infusion  100 mL/hr IntraVENous CONTINUOUS        Lab Data Reviewed: (see below)  Lab Review:     Recent Labs     06/14/21  0102 06/13/21  2211   WBC 6.6 6.5   HGB 12.3 12.7   HCT 38.9 39.4    359     Recent Labs 06/14/21 0102 06/13/21 2211    136   K 3.3* 3.7    106   CO2 26 26   GLU 99 93   BUN 13 13   CREA 1.34* 1.26*   CA 7.9* 8.4*   MG 1.8  --    PHOS 3.8  --    ALB 3.5 3.7   TBILI 0.3 0.4   ALT 15 16     No results found for: GLUCPOC  No results for input(s): PH, PCO2, PO2, HCO3, FIO2 in the last 72 hours. No results for input(s): INR, INREXT in the last 72 hours. All Micro Results     Procedure Component Value Units Date/Time    CULTURE, URINE [[id number]]     Order Status: Sent Specimen: Urine from Clean catch     URINE CULTURE HOLD SAMPLE [[id number]] Collected: 06/13/21 2211    Order Status: Completed Specimen: Urine from Serum Updated: 06/13/21 2217     Urine culture hold       Urine on hold in Microbiology dept for 2 days. If unpreserved urine is submitted, it cannot be used for addtional testing after 24 hours, recollection will be required. I have reviewed notes of prior 24hr. Other pertinent lab: Total time spent with patient: 28 I personally reviewed chart, notes, data and current medications in the medical record. I have personally examined and treated the patient at bedside during this period.                  Care Plan discussed with: Patient, Nursing Staff, Consultant/Specialist and >50% of time spent in counseling and coordination of care    Discussed:  Care Plan    Prophylaxis:  Lovenox    Disposition:  Home w/Family           ___________________________________________________    Attending Physician: Manuel Gill MD

## 2021-06-14 NOTE — ED PROVIDER NOTES
42-year-old female with a history of multiple prior kidney stones requiring lithotripsy and ureteral stenting with recent ED evaluation on 6/10 and diagnosed with a right-sided kidney stone presents to the emergency department noting persistent severe right flank pain. She was seen at [facility] on Friday and Rx'd Toradol and Dilaudid which she has been taking scheduled aside from missing 1 dose of Toradol earlier today but then this evening she had a significant resurgence of right flank pain that is significantly more severe. She has tried taking multiple doses of p.o. Toradol and Dilaudid at home to no avail of her symptoms. She called the urology on-call number who recommended she present to the ED for further pain relief and possible admission. She is scheduled for lithotripsy on Wednesday as an outpatient but states that she does not think that she can make it that far with pain is severe. She denies any fever, chills, nausea, vomiting, diarrhea. She states that she was prescribed antibiotics last week due to seeing some blood in her urine but she states that she has not started this antibiotic regimen. Past Medical History:   Diagnosis Date    ADHD (attention deficit hyperactivity disorder) 2008    Dr. Monroe Or Ankle fracture, left     4x    Asthma attack     Depression, major 8/2013    Dr. Gloria Shirley allergy 2010    beef (severe), tomoato (severe), others. Dr. Jade Jenkins GERD (gastroesophageal reflux disease)     IUD (intrauterine device) in place 7/2014    Dr. Arlyn Johnson    Nephrolithiasis     multiple     Panic anxiety syndrome 8/2013    Dr. Monroe Or Pap smear for cervical cancer screening     Dr Arlyn Johnson       Past Surgical History:   Procedure Laterality Date    HX TONSIL AND ADENOIDECTOMY      LITHOTRIPSY  3 total         No family history on file.     Social History     Socioeconomic History    Marital status:      Spouse name: Not on file    Number of children: Not on file    Years of education: Not on file    Highest education level: Not on file   Occupational History    Not on file   Tobacco Use    Smoking status: Never Smoker    Smokeless tobacco: Never Used   Substance and Sexual Activity    Alcohol use: No    Drug use: No    Sexual activity: Yes   Other Topics Concern    Not on file   Social History Narrative    Not on file     Social Determinants of Health     Financial Resource Strain:     Difficulty of Paying Living Expenses:    Food Insecurity:     Worried About Running Out of Food in the Last Year:     [address] in the Last Year:    Transportation Needs:     Lack of Transportation (Medical):  Lack of Transportation (Non-Medical):    Physical Activity:     Days of Exercise per Week:     Minutes of Exercise per Session:    Stress:     Feeling of Stress :    Social Connections:     Frequency of Communication with Friends and Family:     Frequency of Social Gatherings with Friends and Family:     Attends Muslim Services:     Active Member of Clubs or Organizations:     Attends Club or Organization Meetings:     Marital Status:    Intimate Partner Violence:     Fear of Current or Ex-Partner:     Emotionally Abused:     Physically Abused:     Sexually Abused: ALLERGIES: Bactrim [sulfamethoprim ds], Beef containing products, Griseofulvin, Macrobid [nitrofurantoin monohyd/m-cryst], and Tomato    Review of Systems   Constitutional: Positive for activity change and appetite change. Negative for chills and fever. HENT: Negative for congestion, rhinorrhea, sinus pressure, sneezing and sore throat. Eyes: Negative for photophobia and visual disturbance. Respiratory: Negative for cough and shortness of breath. Cardiovascular: Negative for chest pain. Gastrointestinal: Positive for abdominal pain and nausea. Negative for blood in stool, constipation, diarrhea and vomiting.    Genitourinary: Positive for dysuria and flank pain. Negative for difficulty urinating, frequency, hematuria, menstrual problem, urgency, vaginal bleeding and vaginal discharge. Musculoskeletal: Negative for arthralgias, back pain, myalgias and neck pain. Skin: Negative for rash and wound. Neurological: Negative for syncope, weakness, numbness and headaches. Psychiatric/Behavioral: Negative for self-injury and suicidal ideas. All other systems reviewed and are negative. Vitals:    06/13/21 2135 06/13/21 2215 06/13/21 2216   BP: (!) 137/97 (!) 134/95    Pulse: 81     Resp: 18     Temp: 97.5 °F (36.4 °C)     SpO2: 95%  96%   Weight: 96.1 kg (211 lb 13.8 oz)     Height: 5' (1.524 m)              Physical Exam  Vitals and nursing note reviewed. Constitutional:       General: She is not in acute distress. Appearance: Normal appearance. She is well-developed. She is obese. She is not diaphoretic. Comments: Uncomfortable appearing but no acute distress. HENT:      Head: Normocephalic and atraumatic. Nose: Nose normal.   Eyes:      Extraocular Movements: Extraocular movements intact. Conjunctiva/sclera: Conjunctivae normal.      Pupils: Pupils are equal, round, and reactive to light. Cardiovascular:      Rate and Rhythm: Normal rate and regular rhythm. Heart sounds: Normal heart sounds. Pulmonary:      Effort: Pulmonary effort is normal.      Breath sounds: Normal breath sounds. Abdominal:      General: There is no distension. Palpations: Abdomen is soft. Tenderness: There is abdominal tenderness in the right lower quadrant and suprapubic area. There is right CVA tenderness. There is no guarding or rebound. Musculoskeletal:         General: No tenderness. Cervical back: Neck supple. Skin:     General: Skin is warm and dry. Neurological:      General: No focal deficit present. Mental Status: She is alert and oriented to person, place, and time. Cranial Nerves: No cranial nerve deficit. Sensory: No sensory deficit. Motor: No weakness. Coordination: Coordination normal.          MDM   49-year-old female with known right-sided kidney stone with planned outpatient lithotripsy in a few days presents with intractable right flank pain despite p.o. pain medications. She is afebrile with vital signs stable no acute distress. Labs returned showing creatinine 1.26, similar to prior measures, otherwise reassuring, UA shows large blood with 10-20 WBCs but no bacteria. She was given multiple doses of IV pain medications in the ED only some improvement in her symptoms. She states that she would prefer being admitted to the hospital for intractable flank pain to be seen by urology in consultation and possibly undergo lithotripsy sooner than she has scheduled as an outpatient. Procedures    [facility] Consult for Admission  11:08 PM    ED Room Number: TH29/35  Patient Name and age:  Carlos A Durán 30 y.o.  female  Working Diagnosis:   1. Kidney stone    2. Right flank pain        COVID-19 Suspicion:  no  Sepsis present:  no  Reassessment needed: no  Code Status:  Full Code  Readmission: no  Isolation Requirements:  no  Recommended Level of Care:  med/surg  Department:[facility] ED - [phone]  Other: 49-year-old female with known kidney stone identified on CT scan on 6/10, who has followed with [facility] and has been taking p.o. Toradol and Dilaudid presents with intractable persistent right flank pain unalleviated by po meds. Requiring multiple rounds of IV pain medications here. Urology consulted. Has lithotripsy scheduled on Wednesday, but doesn't think she can make it till then.

## 2021-06-14 NOTE — DISCHARGE INSTRUCTIONS
ACUTE DIAGNOSES:  Hydronephrosis, right [N13.30]    CHRONIC MEDICAL DIAGNOSES:  Problem List as of 2021 Date Reviewed: 2021        Codes Class Noted - Resolved    Hydronephrosis, right ICD-10-CM: N13.30  ICD-9-CM: 591  2021 - Present        Missed  ICD-10-CM: O02.1  ICD-9-CM: 632  10/8/2020 - Present        Food allergy ICD-10-CM: Z91.018  ICD-9-CM: V15.05  Unknown - Present    Overview Signed 3/20/2015 10:39 AM by Coby Campbell MD     beef (severe), tomoato (severe)             Nephrolithiasis ICD-10-CM: N20.0  ICD-9-CM: 592.0  Unknown - Present        ADHD (attention deficit hyperactivity disorder) ICD-10-CM: F90.9  ICD-9-CM: 314.01  Unknown - Present    Overview Signed 3/20/2015 10:43 AM by MD Dr. Antonieta Bragg             Ankle fracture, left ICD-10-CM: S18.362Y  ICD-9-CM: 824.8  Unknown - Present    Overview Signed 3/20/2015 10:43 AM by Coby Campbell MD     4x             GERD (gastroesophageal reflux disease) ICD-10-CM: K21.9  ICD-9-CM: 530.81  Unknown - Present        Depression, major ICD-10-CM: F32.9  ICD-9-CM: 296.20  2013 - Present    Overview Signed 3/20/2015 10:43 AM by MD Dr. Antonieta Bragg             Panic anxiety syndrome ICD-10-CM: F41.0  ICD-9-CM: 300.01  2013 - Present    Overview Signed 3/20/2015 10:43 AM by MD Dr. Servando Bragg:          · It is important that you take the medication exactly as they are prescribed. · Keep your medication in the bottles provided by the pharmacist and keep a list of the medication names, dosages, and times to be taken in your wallet. · Do not take other medications without consulting your doctor.        DIET:  Regular Diet    ACTIVITY: Activity as tolerated    ADDITIONAL INFORMATION: If you experience any of the following symptoms then please call your primary care physician or return to the emergency room if you cannot get hold of your doctor: Fever, chills, nausea, vomiting, diarrhea, change in mentation, falling, bleeding, shortness of breath. FOLLOW UP CARE:  Dr. Luz Marina Shi MD  you are to call and set up an appointment to see them in 5 days. Follow-up with urology on 6/16/21      Information obtained by :  I understand that if any problems occur once I am at home I am to contact my physician. I understand and acknowledge receipt of the instructions indicated above.                                                                                                                                            Physician's or R.N.'s Signature                                                                  Date/Time                                                                                                                                              Patient or Representative Signature                                                          Date/Time

## 2021-06-16 LAB
BACTERIA SPEC CULT: NORMAL
CC UR VC: NORMAL
SERVICE CMNT-IMP: NORMAL

## 2022-03-19 PROBLEM — O02.1 MISSED ABORTION: Status: ACTIVE | Noted: 2020-10-08

## 2022-03-19 PROBLEM — N13.30 HYDRONEPHROSIS, RIGHT: Status: ACTIVE | Noted: 2021-06-13

## 2022-07-06 ENCOUNTER — HOSPITAL ENCOUNTER (EMERGENCY)
Age: 27
Discharge: LEFT BEFORE DISCHARGE | End: 2022-07-06
Payer: COMMERCIAL

## 2022-07-06 PROCEDURE — 75810000275 HC EMERGENCY DEPT VISIT NO LEVEL OF CARE

## 2022-10-24 LAB
CHLAMYDIA, EXTERNAL: NEGATIVE
N. GONORRHEA, EXTERNAL: NEGATIVE

## 2022-11-21 LAB
ANTIBODY SCREEN, EXTERNAL: NEGATIVE
HBSAG, EXTERNAL: NEGATIVE
HIV, EXTERNAL: NEGATIVE
RPR, EXTERNAL: NON REACTIVE
RUBELLA, EXTERNAL: NORMAL
TYPE, ABO & RH, EXTERNAL: NORMAL

## 2022-12-16 ENCOUNTER — HOSPITAL ENCOUNTER (OUTPATIENT)
Dept: PERINATAL CARE | Age: 27
Discharge: HOME | End: 2022-12-16
Attending: OBSTETRICS & GYNECOLOGY
Payer: COMMERCIAL

## 2022-12-16 PROCEDURE — 76811 OB US DETAILED SNGL FETUS: CPT | Performed by: OBSTETRICS & GYNECOLOGY

## 2022-12-16 PROCEDURE — 76812 OB US DETAILED ADDL FETUS: CPT | Performed by: OBSTETRICS & GYNECOLOGY

## 2022-12-19 DIAGNOSIS — O24.419 GESTATIONAL DIABETES MELLITUS (GDM), ANTEPARTUM, GESTATIONAL DIABETES METHOD OF CONTROL UNSPECIFIED: Primary | ICD-10-CM

## 2022-12-19 RX ORDER — ISOPROPYL ALCOHOL 70 ML/100ML
SWAB TOPICAL
Qty: 150 PAD | Refills: 5 | Status: SHIPPED | OUTPATIENT
Start: 2022-12-19

## 2022-12-19 RX ORDER — IBUPROFEN 200 MG
CAPSULE ORAL
Qty: 200 STRIP | Refills: 3 | Status: SHIPPED | OUTPATIENT
Start: 2022-12-19 | End: 2022-12-21

## 2022-12-19 RX ORDER — LANCETS
EACH MISCELLANEOUS
Qty: 120 EACH | Refills: 11 | Status: SHIPPED | OUTPATIENT
Start: 2022-12-19 | End: 2022-12-21

## 2022-12-19 RX ORDER — INSULIN PUMP SYRINGE, 3 ML
EACH MISCELLANEOUS
Qty: 1 KIT | Refills: 0 | Status: SHIPPED | OUTPATIENT
Start: 2022-12-19 | End: 2022-12-21

## 2022-12-21 DIAGNOSIS — O24.419 GESTATIONAL DIABETES MELLITUS (GDM), ANTEPARTUM, GESTATIONAL DIABETES METHOD OF CONTROL UNSPECIFIED: Primary | ICD-10-CM

## 2022-12-21 RX ORDER — BLOOD SUGAR DIAGNOSTIC
STRIP MISCELLANEOUS
Qty: 200 STRIP | Refills: 10 | Status: SHIPPED | OUTPATIENT
Start: 2022-12-21

## 2022-12-21 RX ORDER — LANCETS
EACH MISCELLANEOUS
Qty: 120 EACH | Refills: 11 | Status: SHIPPED | OUTPATIENT
Start: 2022-12-21

## 2022-12-21 RX ORDER — INSULIN PUMP SYRINGE, 3 ML
EACH MISCELLANEOUS
Qty: 1 KIT | Refills: 0 | Status: SHIPPED | OUTPATIENT
Start: 2022-12-21

## 2022-12-28 ENCOUNTER — CLINICAL SUPPORT (OUTPATIENT)
Dept: DIABETES SERVICES | Age: 27
End: 2022-12-28
Payer: COMMERCIAL

## 2022-12-28 DIAGNOSIS — O24.419 GESTATIONAL DIABETES MELLITUS (GDM), ANTEPARTUM, GESTATIONAL DIABETES METHOD OF CONTROL UNSPECIFIED: Primary | ICD-10-CM

## 2022-12-28 PROCEDURE — G0108 DIAB MANAGE TRN  PER INDIV: HCPCS

## 2022-12-28 NOTE — PROGRESS NOTES
[employer] [facility]  Diabetes Self-Management Education & Support Program  Encounter Note    SUMMARY  Diabetes self-care management training was completed related to reducing risks and healthy eating. The participant will call this office and schedule her next education session when needed. The participant did identify SMART Goal(s) and will practice knowledge and skills related to healthy eating and monitoring to improve diabetes self-management. EVALUATION:  Participant states this is her first pregnancy with gestational diabetes. She is checking her blood sugars and logging her readings on her phone. She has some nausea at times and was told to eat smaller but frequent meals. She is not usually eating 3 healthy plate meals daily. She is drinking sugary beverages like milkshakes, sweet tea, and Dr. Debi White. RECOMMENDATIONS:  Encouraged her to continue to monitor her blood sugars 4 times daily, share her readings with her providers, monitor her carbohydrate portions of foods and beverages at meal times, and talk to her provider when needed about her gestational diabetes management. TOPICS DISCUSSED TODAY:  WHAT IS DIABETES? Minutes: 30  WHAT CAN I EAT? 30    Next provider visit is scheduled for 1/13/2023 with Perinatology      SMART GOAL(S)  Log blood glucose readings 7 days over the next week. ACHIEVEMENT OF GOAL(S) : 0-24%    2. Check blood glucose 4 times per day over the next week. ACHIEVEMENT OF GOAL(S) :  0-24%       DATE DSMES TOPIC EVALUATION     12/28/2022 WHAT IS DIABETES?    Role of the normal pancreas in energy balance and blood glucose control   The defect seen in diabetes   Signs & symptoms of diabetes   Diagnosis of diabetes   Types of diabetes   Blood glucose targets in non-pregnant & non-pregnant adults       The participant knows  Their type of diabetes: Yes  The basic physiologic defect: Yes  Blood glucose targets: Yes     DATE DSMES TOPIC EVALUATION 12/28/2022 WHAT CAN I EAT? General principles   Determining a healthy weight   Nutritional terms & tools   Healthy Plate method   Carbohydrate Counting   Reading food labels   Free apps   Pregnancy recommendations      The participant   Uses Healthy Plate principles in constructing meals: No  Reads food labels in choosing acceptable foods: No    The participant needs to address starting to eat healthy plate meals and reading her food labels. [employer] [facility]  Diabetes Self-Management Education & Support Program    Reason for Referral: Elevated 3 hour Glucose Tolerance Test  Referral Source: Terrie Levi MD  Services requested: Pregnancy DSMES       ASSESSMENT    From my perspective, the participant would benefit from [facility] specifically related to reducing risks, healthy eating, monitoring, taking medications, physical activity, and problem solving. Will adapt DSMES program to build on participant's skills score, confidence score, and preparedness score as noted in the Diabetes Skills, Confidence, and Preparedness Index. During the program, we will focus on providing DSMES that specifically addresses participant's interest in healthy eating, monitoring, taking medications, and problem solving, as shown by their reported readiness to change. The participant would be best served by attending weekly individual sessions. Diabetes Self-Management Education Follow-up Visit: She will call this office and schedule her next education session. Clinical Presentation  Zaid Neumann is a 32 y.o.  female referred for diabetes self-management education. Participant has GDM for <1 year. Family history unknown for diabetes.  Patient reports not receiving DSMES services in the past. Most recent A1c value:   Lab Results   Component Value Date/Time    Hemoglobin A1c 5.4 06/14/2021 01:02 AM       Diabetes-related medications:  Current dosing:   Key Antihyperglycemic Medications       Patient is on no antihyperglycemic meds. Blood Pressure Management  Key ACE/ARB Medications       Patient is on no ACE or ARB meds. Lipid Management  Key Antihyperlipidemia Meds       The patient is on no antihyperlipidemia meds. Clot Prevention  Key Anti-Platelet Anticoagulant Meds       The patient is on no antiplatelet meds or anticoagulants. Learning Assessment  Learning objectives Educator assessment (12/28/2022)   Diabetes Disease Process  The participant can   A) describe diabetes in basic terms;   B) state the type of diabetes they have; &   C) state accepted blood glucose targets. Healthy Eating  The participant can   A) identify carbohydrate foods; &   B) accurately read food labels. Being Active  The participant can  A) state the benefits of physical activity;  B) report their current PA practices;  C) identify PA they would consider incorporating in their lives; &  D) develop an implementation plan. Monitoring  The participant can  A) operate their blood glucose meter; &  B) describe how they log their blood glucoses to share with their provider. Taking Medications  The participant can  A) name their diabetes medications;  B) state the purpose and dose;  C) note side effects; &  D) describe proper storage, disposal & transport (if appropriate). Healthy Coping  The participant can    A) describe their response to diabetes diagnosis; B) describe their specific coping mechanisms;  C) identify supportive people and/or other resources that positively support their diabetes self-care and health. Reducing Risks  The participant can describe the preventive measures used by providers to promote health and prevent diabetes complications.      Problem Solving  The participant can   A) identify signs, symptoms & treatment of hypoglycemia;    B) identify signs, symptoms & treatment of hyperglycemia;  C) describe their sick day plan; &  D) identify BG patterns to discuss with their provider. Yes  Yes  Yes        No  No        No  No  No  No        Yes  Yes        No  No  No  No        Yes  Yes  Yes        Yes          No  No  No  No     Characteristics to Learning   Barriers to Learning      None     Favorite Ways to Learn   [] Lecture  [] Slides  [x] Reading [x] Video-Internet  [] Cassettes/CDs/MP3's  [] Interactive Small Groups [] Other       Behavioral Assessment  Current self-care practices  Educator assessment (12/28/2022)   Healthy Eating   Current practices    24-hour Dietary Recall:  Breakfast: None  Lunch: 8 nuggets, large fries  Dinner: Pulled chicken, queso, tortillas  Snacks: Chips  Beverages: 16 oz Dr. Rianna Roman, Sweet tea, 1/2 strawberry milkshake, Water   Alcohol: None       Would benefit from DSMES related to Healthy Eating: Yes    Eats a carbohydrate controlled diet: No    Stage of change: Pre-contemplation      Being Active  Current practices  How many days during the past week have you performed physical activity where your heart beats faster and your breathing is harder than normal for 30 minutes or more?  0 day(s)    How many days in a typical week do you perform activity such as this?  0 day(s)     Would benefit from [facility] related to Being Active: Yes}      Exercises 150 minutes/week: No      Stage of change: Pre-contemplation     Monitoring  Current practices  Do you monitor your blood sugar? Yes    How often do you monitor? 4x/day    What are the range of readings?  mg/dL  Breakfast: 111-138 mg/dL   Lunch: 111-150 mg/dL   Dinner: 103-166 mg/dL    Do you know your last A1c measurement? No    Do you know the meaning of the A1c? No     Would benefit from [facility] related to Monitoring: Yes      Uses BG readings to establish trends and understand BG patterns: No      Stage of change: Preparation       Taking Medication  Current practices  Do you understand what your diabetes medications do?  No    How often do you miss doses of your diabetes medications? not taking medications     Would benefit from [facility] related to Taking Medication: Yes      Takes medications consistently to receive full benefit: No      Stage of change:  Not taking any diabetes medication. Healthy Coping   Current state  Diabetes Skills, Confidence and Preparedness Index: Total score: 5.6  Skills: 5.3  Confidence: 5.7  Preparedness: 5.8       Would benefit from DSMES related to Healthy Coping: Yes      Identifies specific people, organizations,etc, that actively support their diabetes self-care efforts: Yes      Stage of change: Action     Reducing Risks  Current state  Vaccines:  Influenza:   Immunization History   Administered Date(s) Administered    Influenza Vaccine 11/17/2015       Pneumococcal: There is no immunization history for the selected administration types on file for this patient. Hepatitis: There is no immunization history for the selected administration types on file for this patient. Examinations:  No Diabetic HM Topics for this patient     Dental exam: due    Foot exam: due    Heart Protection:  BP Readings from Last 2 Encounters:   06/14/21 120/76   06/11/21 101/66        Lab Results   Component Value Date/Time    LDL, calculated 125 (H) 10/06/2015 11:35 AM        Kidney Protection:  No results found for: MCACR, MCA1, MCA2, MCA3, MCAU, MCAU2, MCALPOCT     Would benefit from [facility] related to Reducing Risks:  Yes      Actively participates in decision-making with provider regarding secondary prevention:  No      Stage of change: Pre-contemplation   Problem Solving  Current state  Hypoglycemia Management:  What are signs and symptoms of hypoglycemia that you experience: Pt reported being unaware of s/s of hypoglycemia    How do you prevent hypoglycemia: patient is unaware of how to treat low blood sugars    How do you treat hypoglycemia: Patient is unaware of how to treat hypoglycemia    Hyperglycemia Management:  What are signs and symptoms of hyperglycemia that you experience: Pt reported being unaware of s/s of hyperglycemia    How can you prevent hyperglycemia: patient is unaware of how to prevent high blood sugars    Sick Day Management:  What do you do differently on sick days:  Pt reported being unaware of self-management on sick days    Pattern Management:  Do you notice blood glucose patterns when you look at the readings in your meter or logbook? No    How do you use the blood glucose readings from your meter or logbook? understand how body responds to meals     Would benefit from [facility] related to Problem Solving: Yes      Articulates appropriate strategies to address hypoglycemia, hyperglycemia, sick day care and BG pattern: No      Stage of change: Preparation       Note: Content derived from the American Association of Diabetes Educators' Diabetes Education Curriculum: A Guide to Successful Self-Management (3rd edition)      Missael Ortzi RN on 12/28/2022 at 3:46 PM    I have personally reviewed the health record, including provider notes, laboratory data and current medications before making these care and education recommendations. The time spent in this effort is included in the total time. Total minutes: 65    Diabetes Skills, Confidence & Preparedness Index (SCPI) ©  Thank you for completing the Skills, Confidence & Preparedness Index! Below are your scores. All scales and questions are out of 7. If you would like these results emailed, please enter your email address along with some identifying patient information.   Email:    Patient Identifier:        Overall SCPI score: 5.6 Skills Score: 5.3  Low: Problem Solving(Q6),Healthy Coping(Q7),Reducing Risks(Q9) Confidence Score: 5.7  Low: Being Active(Q5),Problem UEPNLBO(Q6) Preparedness Score: 5.8  Low: Healthy Coping(Q3)  Healthy Eating Score: 6.0  Low: Skills(Q1),Confidence(Q1),Confidence(Q4),Preparedness(Q1) Taking Medication Score: N/A Blood Sugar Monitoring Score: 6.3  Low: Skills(Q8),Confidence(Q6),Preparedness(Q6) Reducing Risks Score: 5.3  Low: UNIBSF(M8)  Problem Solving Score: 5.0  Low: Skills(Q6) Healthy Coping Score: 5.0  Low: YEBGWZ(F8) Being Active Score: 5.5  Low: Confidence(Q5)    Skills/Knowledge Questions  1. I know how to plan meals that have the best balance between carbohydrates, proteins and vegetables. 6  2. I know how my diabetes medications (pills, injectables and/or insulin) work in my body. 0  3. I know when to check my blood sugar if I want to see how my body responded to a meal. 7  4. I know when to check my blood sugars to determine if my medication or insulin doses are correct. 0  5. I know what to do to prevent a low blood sugar when I exercise (either before, during, or after). 6  6. When I am sick, I know what to do differently with my diabetes management. 4  7. I know how stress can affect my diabetes management. 4  8. When I look at my blood sugars over a given week, I can explain what my blood sugar pattern is. 6  9. I know what my target levels are for A1c, blood pressure and cholesterol. 4  Confidence Questions  1. I am confident that I can plan balanced meals and snacks. 6  2. I am confident that I can manage my stress. 6  3. I am confident that I can prevent a low blood sugar during or after exercise. 6  4. I am confident that the next time I eat out, I will be able to choose foods that best keep my blood sugars in target. 6  5. I am confident I can include exercise into my schedule. 5  6. I am confident that I can use my daily blood sugars to adjust my diet, my activity, and/or my insulin. 6  7. When something out of my normal routine happens, I am confident that I can problem-solve and keep my diabetes on track. 5  Preparedness Questions  1. Within the next month, I will begin to eat more balanced meals and snacks. 6  2. Within the next month, I will choose an exercise activity and I will start fitting it into my schedule. 6  3.  Within the next month, I will make a list of stress management options that work for me. 5  4. Within the next month, I will consistently plan ahead to prevent low blood sugars. 0  5. Within the next month, I will start adjusting my insulin doses on my own. 0  6. Within the next month, I will begin making changes to my diabetes management based on my daily blood sugars (eg - eating, activity and/or insulin). 6  7. Within the next month, I will begin making changes to my diabetes management to meet my overall goals (eg - eating, activity and/or insulin).  6

## 2023-01-13 ENCOUNTER — HOSPITAL ENCOUNTER (OUTPATIENT)
Dept: PERINATAL CARE | Age: 28
Discharge: HOME | End: 2023-01-13
Attending: OBSTETRICS & GYNECOLOGY
Payer: COMMERCIAL

## 2023-01-13 PROCEDURE — 76810 OB US >/= 14 WKS ADDL FETUS: CPT | Performed by: OBSTETRICS & GYNECOLOGY

## 2023-01-13 PROCEDURE — 76805 OB US >/= 14 WKS SNGL FETUS: CPT

## 2023-01-13 PROCEDURE — 76820 UMBILICAL ARTERY ECHO: CPT | Performed by: OBSTETRICS & GYNECOLOGY

## 2023-01-13 PROCEDURE — 76819 FETAL BIOPHYS PROFIL W/O NST: CPT | Performed by: OBSTETRICS & GYNECOLOGY

## 2023-01-20 ENCOUNTER — HOSPITAL ENCOUNTER (OUTPATIENT)
Dept: PERINATAL CARE | Age: 28
Discharge: HOME | End: 2023-01-20
Attending: OBSTETRICS & GYNECOLOGY
Payer: COMMERCIAL

## 2023-01-20 PROCEDURE — 76819 FETAL BIOPHYS PROFIL W/O NST: CPT | Performed by: OBSTETRICS & GYNECOLOGY

## 2023-01-20 PROCEDURE — 76820 UMBILICAL ARTERY ECHO: CPT | Performed by: OBSTETRICS & GYNECOLOGY

## 2023-01-23 ENCOUNTER — HOSPITAL ENCOUNTER (INPATIENT)
Age: 28
LOS: 1 days | Discharge: HOME | DRG: 833 | End: 2023-01-26
Attending: STUDENT IN AN ORGANIZED HEALTH CARE EDUCATION/TRAINING PROGRAM | Admitting: OBSTETRICS & GYNECOLOGY
Payer: COMMERCIAL

## 2023-01-23 PROBLEM — O30.009 TWIN PREGNANCY: Status: ACTIVE | Noted: 2023-01-23

## 2023-01-23 PROBLEM — O10.912 CHRONIC HYPERTENSION COMPLICATING OR REASON FOR CARE DURING PREGNANCY, SECOND TRIMESTER: Status: ACTIVE | Noted: 2023-01-23

## 2023-01-23 PROBLEM — E03.9 ACQUIRED HYPOTHYROIDISM: Status: ACTIVE | Noted: 2023-01-23

## 2023-01-23 LAB
ALBUMIN SERPL-MCNC: 2.6 G/DL (ref 3.5–5)
ALBUMIN/GLOB SERPL: 0.7 (ref 1.1–2.2)
ALP SERPL-CCNC: 104 U/L (ref 45–117)
ALT SERPL-CCNC: 19 U/L (ref 12–78)
ANION GAP SERPL CALC-SCNC: 9 MMOL/L (ref 5–15)
AST SERPL-CCNC: 28 U/L (ref 15–37)
BASOPHILS # BLD: 0 K/UL (ref 0–0.1)
BASOPHILS NFR BLD: 0 % (ref 0–1)
BILIRUB SERPL-MCNC: 0.3 MG/DL (ref 0.2–1)
BUN SERPL-MCNC: 8 MG/DL (ref 6–20)
BUN/CREAT SERPL: 11 (ref 12–20)
CALCIUM SERPL-MCNC: 8.8 MG/DL (ref 8.5–10.1)
CHLORIDE SERPL-SCNC: 104 MMOL/L (ref 97–108)
CO2 SERPL-SCNC: 24 MMOL/L (ref 21–32)
CREAT SERPL-MCNC: 0.7 MG/DL (ref 0.55–1.02)
CREAT UR-MCNC: 161 MG/DL
DIFFERENTIAL METHOD BLD: ABNORMAL
EOSINOPHIL # BLD: 0 K/UL (ref 0–0.4)
EOSINOPHIL NFR BLD: 0 % (ref 0–7)
ERYTHROCYTE [DISTWIDTH] IN BLOOD BY AUTOMATED COUNT: 15.5 % (ref 11.5–14.5)
GLOBULIN SER CALC-MCNC: 3.6 G/DL (ref 2–4)
GLUCOSE SERPL-MCNC: 127 MG/DL (ref 65–100)
HCT VFR BLD AUTO: 33.7 % (ref 35–47)
HGB BLD-MCNC: 10.6 G/DL (ref 11.5–16)
IMM GRANULOCYTES # BLD AUTO: 0.1 K/UL (ref 0–0.04)
IMM GRANULOCYTES NFR BLD AUTO: 1 % (ref 0–0.5)
LYMPHOCYTES # BLD: 1.5 K/UL (ref 0.8–3.5)
LYMPHOCYTES NFR BLD: 13 % (ref 12–49)
MCH RBC QN AUTO: 26.6 PG (ref 26–34)
MCHC RBC AUTO-ENTMCNC: 31.5 G/DL (ref 30–36.5)
MCV RBC AUTO: 84.7 FL (ref 80–99)
MONOCYTES # BLD: 0.8 K/UL (ref 0–1)
MONOCYTES NFR BLD: 7 % (ref 5–13)
NEUTS SEG # BLD: 8.7 K/UL (ref 1.8–8)
NEUTS SEG NFR BLD: 79 % (ref 32–75)
NRBC # BLD: 0.12 K/UL (ref 0–0.01)
NRBC BLD-RTO: 1.1 PER 100 WBC
PLATELET # BLD AUTO: 245 K/UL (ref 150–400)
PMV BLD AUTO: 12.4 FL (ref 8.9–12.9)
POTASSIUM SERPL-SCNC: 4.5 MMOL/L (ref 3.5–5.1)
PROT SERPL-MCNC: 6.2 G/DL (ref 6.4–8.2)
PROT UR-MCNC: 69 MG/DL (ref 0–11.9)
PROT/CREAT UR-RTO: 0.4
RBC # BLD AUTO: 3.98 M/UL (ref 3.8–5.2)
SODIUM SERPL-SCNC: 137 MMOL/L (ref 136–145)
WBC # BLD AUTO: 11.1 K/UL (ref 3.6–11)

## 2023-01-23 PROCEDURE — 84156 ASSAY OF PROTEIN URINE: CPT

## 2023-01-23 PROCEDURE — 74011250636 HC RX REV CODE- 250/636: Performed by: OBSTETRICS & GYNECOLOGY

## 2023-01-23 PROCEDURE — 36415 COLL VENOUS BLD VENIPUNCTURE: CPT

## 2023-01-23 PROCEDURE — G0378 HOSPITAL OBSERVATION PER HR: HCPCS

## 2023-01-23 PROCEDURE — 80053 COMPREHEN METABOLIC PANEL: CPT

## 2023-01-23 PROCEDURE — 74011250637 HC RX REV CODE- 250/637: Performed by: STUDENT IN AN ORGANIZED HEALTH CARE EDUCATION/TRAINING PROGRAM

## 2023-01-23 PROCEDURE — 85025 COMPLETE CBC W/AUTO DIFF WBC: CPT

## 2023-01-23 PROCEDURE — 65270000029 HC RM PRIVATE

## 2023-01-23 PROCEDURE — 74011250637 HC RX REV CODE- 250/637: Performed by: OBSTETRICS & GYNECOLOGY

## 2023-01-23 RX ORDER — CALCIUM CARBONATE 200(500)MG
200 TABLET,CHEWABLE ORAL
Status: DISCONTINUED | OUTPATIENT
Start: 2023-01-23 | End: 2023-01-26 | Stop reason: HOSPADM

## 2023-01-23 RX ORDER — FOLIC ACID/MULTIVIT,IRON,MINER 0.4MG-18MG
1 TABLET ORAL DAILY
Status: DISCONTINUED | OUTPATIENT
Start: 2023-01-24 | End: 2023-01-26 | Stop reason: HOSPADM

## 2023-01-23 RX ORDER — LEVOTHYROXINE SODIUM 50 UG/1
50 TABLET ORAL
COMMUNITY
End: 2023-03-15

## 2023-01-23 RX ORDER — LABETALOL HYDROCHLORIDE 5 MG/ML
20 INJECTION, SOLUTION INTRAVENOUS
Status: ACTIVE | OUTPATIENT
Start: 2023-01-23 | End: 2023-01-24

## 2023-01-23 RX ORDER — FOLIC ACID 1 MG/1
1 TABLET ORAL DAILY
Status: DISCONTINUED | OUTPATIENT
Start: 2023-01-24 | End: 2023-01-26 | Stop reason: HOSPADM

## 2023-01-23 RX ORDER — LABETALOL 200 MG/1
200 TABLET, FILM COATED ORAL 2 TIMES DAILY
COMMUNITY
End: 2023-01-26

## 2023-01-23 RX ORDER — SERTRALINE HYDROCHLORIDE 50 MG/1
75 TABLET, FILM COATED ORAL DAILY
Status: DISCONTINUED | OUTPATIENT
Start: 2023-01-23 | End: 2023-01-26 | Stop reason: HOSPADM

## 2023-01-23 RX ORDER — BETAMETHASONE SODIUM PHOSPHATE AND BETAMETHASONE ACETATE 3; 3 MG/ML; MG/ML
12 INJECTION, SUSPENSION INTRA-ARTICULAR; INTRALESIONAL; INTRAMUSCULAR; SOFT TISSUE EVERY 24 HOURS
Status: COMPLETED | OUTPATIENT
Start: 2023-01-23 | End: 2023-01-24

## 2023-01-23 RX ORDER — LABETALOL 200 MG/1
200 TABLET, FILM COATED ORAL EVERY 12 HOURS
Status: DISCONTINUED | OUTPATIENT
Start: 2023-01-23 | End: 2023-01-23

## 2023-01-23 RX ORDER — SERTRALINE HYDROCHLORIDE 50 MG/1
75 TABLET, FILM COATED ORAL DAILY
Status: DISCONTINUED | OUTPATIENT
Start: 2023-01-24 | End: 2023-01-23

## 2023-01-23 RX ORDER — NIFEDIPINE 10 MG/1
20 CAPSULE ORAL
Status: COMPLETED | OUTPATIENT
Start: 2023-01-23 | End: 2023-01-23

## 2023-01-23 RX ORDER — NIFEDIPINE 10 MG/1
10 CAPSULE ORAL
Status: COMPLETED | OUTPATIENT
Start: 2023-01-23 | End: 2023-01-23

## 2023-01-23 RX ORDER — GUAIFENESIN 100 MG/5ML
81 LIQUID (ML) ORAL DAILY
Status: DISCONTINUED | OUTPATIENT
Start: 2023-01-24 | End: 2023-01-26 | Stop reason: HOSPADM

## 2023-01-23 RX ADMIN — LABETALOL HYDROCHLORIDE 200 MG: 200 TABLET, FILM COATED ORAL at 20:13

## 2023-01-23 RX ADMIN — NIFEDIPINE 20 MG: 10 CAPSULE ORAL at 20:58

## 2023-01-23 RX ADMIN — SERTRALINE 75 MG: 50 TABLET, FILM COATED ORAL at 22:31

## 2023-01-23 RX ADMIN — NIFEDIPINE 20 MG: 10 CAPSULE ORAL at 21:24

## 2023-01-23 RX ADMIN — BETAMETHASONE SODIUM PHOSPHATE AND BETAMETHASONE ACETATE 12 MG: 3; 3 INJECTION, SUSPENSION INTRA-ARTICULAR; INTRALESIONAL; INTRAMUSCULAR at 22:31

## 2023-01-23 RX ADMIN — ANTACID TABLETS 200 MG: 500 TABLET, CHEWABLE ORAL at 23:06

## 2023-01-23 RX ADMIN — NIFEDIPINE 10 MG: 10 CAPSULE ORAL at 20:33

## 2023-01-24 ENCOUNTER — HOSPITAL ENCOUNTER (OUTPATIENT)
Dept: PERINATAL CARE | Age: 28
Setting detail: OBSERVATION
Discharge: HOME | End: 2023-01-24
Attending: OBSTETRICS & GYNECOLOGY
Payer: COMMERCIAL

## 2023-01-24 PROBLEM — O30.042 DICHORIONIC DIAMNIOTIC TWIN PREGNANCY IN SECOND TRIMESTER: Status: ACTIVE | Noted: 2023-01-23

## 2023-01-24 PROBLEM — O36.5990 FETAL GROWTH RESTRICTION ANTEPARTUM: Status: ACTIVE | Noted: 2023-01-24

## 2023-01-24 PROBLEM — O24.410 DIET CONTROLLED GESTATIONAL DIABETES MELLITUS (GDM) IN SECOND TRIMESTER: Status: ACTIVE | Noted: 2023-01-24

## 2023-01-24 PROBLEM — O99.210 OBESITY IN PREGNANCY: Status: ACTIVE | Noted: 2023-01-24

## 2023-01-24 LAB
GLUCOSE BLD STRIP.AUTO-MCNC: 120 MG/DL (ref 65–117)
GLUCOSE BLD STRIP.AUTO-MCNC: 136 MG/DL (ref 65–117)
GLUCOSE BLD STRIP.AUTO-MCNC: 145 MG/DL (ref 65–117)
GLUCOSE BLD STRIP.AUTO-MCNC: 192 MG/DL (ref 65–117)
SERVICE CMNT-IMP: ABNORMAL

## 2023-01-24 PROCEDURE — 74011250636 HC RX REV CODE- 250/636: Performed by: OBSTETRICS & GYNECOLOGY

## 2023-01-24 PROCEDURE — 74011250637 HC RX REV CODE- 250/637: Performed by: OBSTETRICS & GYNECOLOGY

## 2023-01-24 PROCEDURE — 74011250637 HC RX REV CODE- 250/637: Performed by: STUDENT IN AN ORGANIZED HEALTH CARE EDUCATION/TRAINING PROGRAM

## 2023-01-24 PROCEDURE — 65270000029 HC RM PRIVATE

## 2023-01-24 PROCEDURE — G0378 HOSPITAL OBSERVATION PER HR: HCPCS

## 2023-01-24 PROCEDURE — 82962 GLUCOSE BLOOD TEST: CPT

## 2023-01-24 PROCEDURE — 74011250636 HC RX REV CODE- 250/636: Performed by: STUDENT IN AN ORGANIZED HEALTH CARE EDUCATION/TRAINING PROGRAM

## 2023-01-24 RX ORDER — ACETAMINOPHEN 500 MG
1000 TABLET ORAL ONCE
Status: COMPLETED | OUTPATIENT
Start: 2023-01-24 | End: 2023-01-24

## 2023-01-24 RX ORDER — ONDANSETRON 2 MG/ML
8 INJECTION INTRAMUSCULAR; INTRAVENOUS
Status: DISCONTINUED | OUTPATIENT
Start: 2023-01-24 | End: 2023-01-25

## 2023-01-24 RX ADMIN — FOLIC ACID 1 MG: 1 TABLET ORAL at 10:33

## 2023-01-24 RX ADMIN — LABETALOL HYDROCHLORIDE 300 MG: 200 TABLET, FILM COATED ORAL at 21:07

## 2023-01-24 RX ADMIN — ACETAMINOPHEN 1000 MG: 500 TABLET ORAL at 07:33

## 2023-01-24 RX ADMIN — LABETALOL HYDROCHLORIDE 300 MG: 200 TABLET, FILM COATED ORAL at 09:09

## 2023-01-24 RX ADMIN — ONDANSETRON 4 MG: 2 INJECTION INTRAMUSCULAR; INTRAVENOUS at 09:30

## 2023-01-24 RX ADMIN — BETAMETHASONE SODIUM PHOSPHATE AND BETAMETHASONE ACETATE 12 MG: 3; 3 INJECTION, SUSPENSION INTRA-ARTICULAR; INTRALESIONAL; INTRAMUSCULAR at 21:08

## 2023-01-24 RX ADMIN — ASPIRIN 81 MG: 81 TABLET, CHEWABLE ORAL at 09:10

## 2023-01-24 RX ADMIN — Medication 1 TABLET: at 09:10

## 2023-01-24 RX ADMIN — SERTRALINE 75 MG: 50 TABLET, FILM COATED ORAL at 21:15

## 2023-01-24 NOTE — CONSULTS
MATERNAL FETAL MEDICINE  INPATIENT CONSULTATION    REQUESTED BY: Rafael Jones DO   DATE OF CONSULT: 23    REASON FOR CONSULTATION: elevated blood pressures    Adelaide De La Rosa is a 32 y.o. female  at 25w10d. HPI  Yuliana Fajardo was seen yesterday for her routine OB visit and had elevated blood pressures. She was sent to L&D for monitoring. She required oral nifedipine to control her blood pressures. She denies headaches, vision changes, RUQ pain, nausea or vomiting. She denies contractions, leakage of fluid, vaginal bleeding. Fetal movements X 2    Patient Active Problem List   Diagnosis Code    Food allergy Z91.018    Nephrolithiasis N20.0    Depression, major F32.9    Panic anxiety syndrome F41.0    ADHD (attention deficit hyperactivity disorder) F90.9    GERD (gastroesophageal reflux disease) K21.9    Hydronephrosis, right N13.30    Dichorionic diamniotic twin pregnancy in second trimester O30.042    Chronic hypertension complicating or reason for care during pregnancy, second trimester O10.912    Acquired hypothyroidism E03.9    Obesity in pregnancy O99.210       Past Medical History:   Diagnosis Date    ADHD (attention deficit hyperactivity disorder)     Dr. Amie Sharp    Ankle fracture, left     4x    Asthma attack     Depression, major 2013    Dr. Nena Charlton hypertension     Food allergy 2010    beef (severe), tomoato (severe), others.  Dr. Jackie Burton    GERD (gastroesophageal reflux disease)     Gestational diabetes     Infertility, female     IUD (intrauterine device) in place 2014    Dr. Syed Chase    Nephrolithiasis     multiple     Panic anxiety syndrome 2013    Dr. mAie Sharp    Pap smear for cervical cancer screening     Dr Syed Chase    Polycystic disease, ovaries        Past Surgical History:   Procedure Laterality Date    HX TONSIL AND ADENOIDECTOMY      LITHOTRIPSY  3 total       OB History    Para Term  AB Living   4 0 0 0 3 0   SAB IAB Ectopic Molar Multiple Live Births   3 0 0 0 0 0       Allergies   Allergen Reactions    Bactrim [Sulfamethoprim Ds] Rash    Beef Containing Products Itching     Itchy throat or GI issues     Griseofulvin Rash    Macrobid [Nitrofurantoin Monohyd/M-Cryst] Nausea and Vomiting    Tomato Itching     Itchy throat or GI issues          Current Facility-Administered Medications:     labetaloL (NORMODYNE) tablet 300 mg, 300 mg, Oral, Q12H, Pamela Tierney MD, 300 mg at 01/24/23 0909    ondansetron (ZOFRAN) injection 8 mg, 8 mg, IntraVENous, Q8H PRN, Bre Wise, DO, 4 mg at 01/24/23 0930    prenatal vitamin tablet 1 Tablet, 1 Tablet, Oral, DAILY, Pamela Tierney MD, 1 Tablet at 01/24/23 0910    labetaloL (NORMODYNE;TRANDATE) injection 20 mg, 20 mg, IntraVENous, ONCE PRN, Pamela Tierney MD    folic acid (FOLVITE) tablet 1 mg, 1 mg, Oral, DAILY, Pamela Tierney MD, 1 mg at 01/24/23 1033    aspirin chewable tablet 81 mg, 81 mg, Oral, DAILY, Pamela Tierney MD, 81 mg at 01/24/23 0910    betamethasone (CELESTONE) injection 12 mg, 12 mg, IntraMUSCular, Q24H, Pamela Tierney MD, 12 mg at 01/23/23 2231    sertraline (ZOLOFT) tablet 75 mg, 75 mg, Oral, DAILY, Pamela Tierney MD, 75 mg at 01/23/23 2231    calcium carbonate (TUMS) chewable tablet 200 mg [elemental], 200 mg, Oral, QID PRN, Renato Gifford M, DO, 200 mg at 01/23/23 2306    Social History     Tobacco Use    Smoking status: Never    Smokeless tobacco: Never   Substance Use Topics    Alcohol use: No    Drug use: No       Family History   Problem Relation Age of Onset    Hypertension Mother        Review of Systems   Constitutional: Negative. HENT: Negative. Eyes: Negative. Respiratory: Negative. Cardiovascular: Negative. Gastrointestinal: Negative. Genitourinary: Negative. Musculoskeletal: Negative. Skin: Negative. Neurological: Negative. Endo/Heme/Allergies: Negative. Psychiatric/Behavioral: Negative.        Visit Vitals  /77   Pulse (!) 102   Temp 98.4 °F (36.9 °C)   Resp 17   Ht 5' (1.524 m)   Wt 101.2 kg (223 lb)   SpO2 98%   BMI 43.55 kg/m²       Gen: Comfortable, NAD  Resp: Comfortable respirations  CV: Well perfused  Abd: Gravid, NT, ND  Ext: Moving all extremities well  Neuro: Alert, interactive, appropriate    Recent Results (from the past 24 hour(s))   CBC WITH AUTOMATED DIFF    Collection Time: 01/23/23  7:35 PM   Result Value Ref Range    WBC 11.1 (H) 3.6 - 11.0 K/uL    RBC 3.98 3.80 - 5.20 M/uL    HGB 10.6 (L) 11.5 - 16.0 g/dL    HCT 33.7 (L) 35.0 - 47.0 %    MCV 84.7 80.0 - 99.0 FL    MCH 26.6 26.0 - 34.0 PG    MCHC 31.5 30.0 - 36.5 g/dL    RDW 15.5 (H) 11.5 - 14.5 %    PLATELET 620 130 - 082 K/uL    MPV 12.4 8.9 - 12.9 FL    NRBC 1.1 (H) 0  WBC    ABSOLUTE NRBC 0.12 (H) 0.00 - 0.01 K/uL    NEUTROPHILS 79 (H) 32 - 75 %    LYMPHOCYTES 13 12 - 49 %    MONOCYTES 7 5 - 13 %    EOSINOPHILS 0 0 - 7 %    BASOPHILS 0 0 - 1 %    IMMATURE GRANULOCYTES 1 (H) 0.0 - 0.5 %    ABS. NEUTROPHILS 8.7 (H) 1.8 - 8.0 K/UL    ABS. LYMPHOCYTES 1.5 0.8 - 3.5 K/UL    ABS. MONOCYTES 0.8 0.0 - 1.0 K/UL    ABS. EOSINOPHILS 0.0 0.0 - 0.4 K/UL    ABS. BASOPHILS 0.0 0.0 - 0.1 K/UL    ABS. IMM. GRANS. 0.1 (H) 0.00 - 0.04 K/UL    DF AUTOMATED     METABOLIC PANEL, COMPREHENSIVE    Collection Time: 01/23/23  7:35 PM   Result Value Ref Range    Sodium 137 136 - 145 mmol/L    Potassium 4.5 3.5 - 5.1 mmol/L    Chloride 104 97 - 108 mmol/L    CO2 24 21 - 32 mmol/L    Anion gap 9 5 - 15 mmol/L    Glucose 127 (H) 65 - 100 mg/dL    BUN 8 6 - 20 MG/DL    Creatinine 0.70 0.55 - 1.02 MG/DL    BUN/Creatinine ratio 11 (L) 12 - 20      eGFR >60 >60 ml/min/1.73m2    Calcium 8.8 8.5 - 10.1 MG/DL    Bilirubin, total 0.3 0.2 - 1.0 MG/DL    ALT (SGPT) 19 12 - 78 U/L    AST (SGOT) 28 15 - 37 U/L    Alk.  phosphatase 104 45 - 117 U/L    Protein, total 6.2 (L) 6.4 - 8.2 g/dL    Albumin 2.6 (L) 3.5 - 5.0 g/dL    Globulin 3.6 2.0 - 4.0 g/dL    A-G Ratio 0.7 (L) 1.1 - 2.2 PROTEIN/CREATININE RATIO, URINE    Collection Time: 23  7:35 PM   Result Value Ref Range    Protein, urine random 69 (H) 0.0 - 11.9 mg/dL    Creatinine, urine random 161.00 mg/dL    Protein/Creat. urine Ratio 0.4     GLUCOSE, POC    Collection Time: 23  5:53 AM   Result Value Ref Range    Glucose (POC) 145 (H) 65 - 117 mg/dL    Performed by Melania Ybarra, POC    Collection Time: 23 10:36 AM   Result Value Ref Range    Glucose (POC) 136 (H) 65 - 117 mg/dL    Performed by Sreedhar Montenegro (Trv)        ULTRASOUND:  Twin A: maternal left, breech, 8/8. UA dopplers normal.  Twin B: maternal right, breech, 8/8. UA dopplers normal.    ASSESSMENT:    32 y.o. female  at 25w10d with uncontrolled hypertension versus preeclampsia with severe features. Madyson's P/C ratio is slightly elevated above her baseline. The remainder of her preeclampsia labs are normal. She has no signs or symptoms of severe preeclampsia today. This is likely an exacerbation of chronic hypertension. I recommend titrating her antihpertensives to keep her blood pressures under 140/90. I recommend inpatient management until her blood pressures are consistently less than 140/90. I recommend  corticosteroids for fetal lung maturity due to fetal growth restriction and uncontrolled hypertension. PLAN:    Optimize antihypertensives to keep blood pressures under 140/90  Complete  corticosteroids  Stable for outpatient management when blood pressures remain under 140/90  Blood sugars elevated likely due to  corticosteroids.     Rashid Garcia MD  Maternal Fetal Medicine

## 2023-01-24 NOTE — PROGRESS NOTES
1915-Patient arrived to labor and delivery for evaluation of blood pressures. 1920-Dr. Smith notfied of patients arrival, stating he will put orders in.    2010-Dr. Smith notified of bp at [address] of 150/100 with repeat of  163/103 at 2007, orders received to repeat in 15 minutes and will treat accordingly. Stating to administer 200mg labetolol po now. 2020-Chelo Thornton at bedside for evaluation. 2025-Dr. Nikia Orourke remains at bedside for repeat blood pressure, nifedipine protocol ordered. Dr. Nikia Orourke stating patient may be off monitor and fetal monitoring q shift. 0400-Labetalol held     1910-Bedside and Verbal shift change report given to D. Reyes Quan RN (oncoming nurse) by Macie Syed RN (offgoing nurse). Report included the following information SBAR, MAR, and Recent Results.

## 2023-01-24 NOTE — H&P
History & Physical    Name: Angélica Torres MRN: [id number]  SSN: [id number]    YOB: 1995  Age: 32 y.o. Sex: female      Subjective:     Reason for Admission:  Pregnancy and CHTN    History of Present Illness: Angélica Torres is a 32 y.o.   @25 4/7 weeks prenatal course complicated by CHTN, obesity, A1GDM, and Di/Di twin gestation presents for evaluation of elevated blood pressure. Placed on Labetalol 200mg PO BID last week with normal preeclamptic labs. Patient denies headache, visual changes or RUQ pain. Good FM, no abdominal pain or vaginal discharge. OB History          2    Para        Term                AB        Living             SAB        IAB        Ectopic        Molar        Multiple        Live Births                  Past Medical History:   Diagnosis Date    ADHD (attention deficit hyperactivity disorder)     Dr. Miki Pablo    Ankle fracture, left     4x    Asthma attack     Depression, major 2013    Dr. Kori Aldana allergy 2010    beef (severe), tomoato (severe), others.  Dr. Carmen Conrad    GERD (gastroesophageal reflux disease)     IUD (intrauterine device) in place 2014    Dr. Rupali Pierson    Nephrolithiasis     multiple     Panic anxiety syndrome 2013    Dr. Miki Pablo    Pap smear for cervical cancer screening     Dr Rupali Pierson     Past Surgical History:   Procedure Laterality Date    HX TONSIL AND ADENOIDECTOMY      LITHOTRIPSY  3 total     Social History     Occupational History    Not on file   Tobacco Use    Smoking status: Never    Smokeless tobacco: Never   Substance and Sexual Activity    Alcohol use: No    Drug use: No    Sexual activity: Yes     Family History   Problem Relation Age of Onset    Hypertension Mother        Allergies   Allergen Reactions    Bactrim [Sulfamethoprim Ds] Rash    Beef Containing Products Itching     Itchy throat or GI issues     Griseofulvin Rash    Macrobid [Nitrofurantoin Monohyd/M-Cryst] Nausea and Vomiting    Tomato Itching Itchy throat or GI issues      Prior to Admission medications    Medication Sig Start Date End Date Taking? Authorizing Provider   glucose blood VI test strips (OneTouch Ultra Test) strip Check BS 4 times daily, fasting in the AM and 1 hour after meals. 12/21/22   Arlyn Mak MD   lancets (OneTouch UltraSoft Lancets) misc Check BS 4 times daily, fasting in the AM and 1 hour after meals. 12/21/22   Arlyn Mak MD   Blood-Glucose Meter (OneTouch Ultra2 Meter) monitoring kit Check BS 4 times daily, fasting in the AM and 1 hour after meals. 12/21/22   Arlyn Mak MD   alcohol swabs padm Check blood sugar 4 times daily 12/19/22   Arlyn Mak MD   tamsulosin (Flomax) 0.4 mg capsule Take 0.4 mg by mouth daily. Provider, Historical   HYDROmorphone (Dilaudid) 2 mg tablet Take 2 mg by mouth every four (4) hours as needed (kidney stone pain not controlled by ketorolac). Provider, Historical   sertraline (ZOLOFT) 50 mg tablet Take 75 mg by mouth nightly. Provider, Historical   omeprazole (PRILOSEC) 20 mg capsule Take 20 mg by mouth daily. Provider, Historical   albuterol (PROVENTIL HFA, VENTOLIN HFA, PROAIR HFA) 90 mcg/actuation inhaler Take 2 Puffs by inhalation every four (4) hours as needed for Wheezing. 1/10/18   Linda Mckeon MD    ASA 81mg PO every day  Levothyroxine 50 mcg PO every day  Folate    Review of Systems   Constitutional:  Negative for chills and fever. Eyes:  Negative for blurred vision and double vision. Respiratory:  Negative for cough, shortness of breath and wheezing. Cardiovascular:  Negative for chest pain and leg swelling. Gastrointestinal:  Negative for abdominal pain, nausea and vomiting. Genitourinary: Negative. Musculoskeletal: Negative. Neurological:  Negative for headaches. Psychiatric/Behavioral:  The patient is nervous/anxious.             Objective:     Patient Vitals for the past 24 hrs:   BP Pulse SpO2   01/23/23 2123 -- -- 98 % 01/23/23 2120 (!) 167/90 (!) 104 --   01/23/23 2118 -- -- 98 %   01/23/23 2113 -- -- 98 %   01/23/23 2108 -- -- 98 %   01/23/23 2103 -- -- 98 %   01/23/23 2058 -- -- 97 %   01/23/23 2054 (!) 167/103 (!) 101 --   01/23/23 2053 -- -- 98 %   01/23/23 2048 -- -- 98 %   01/23/23 2043 -- -- 98 %   01/23/23 2038 -- -- 98 %   01/23/23 2033 -- -- 99 %   01/23/23 2028 -- -- 98 %   01/23/23 2027 (!) 167/107 100 --   01/23/23 2023 -- -- 98 %   01/23/23 2018 -- -- 99 %   01/23/23 2013 -- -- 99 %   01/23/23 2008 -- -- 98 %   01/23/23 2007 (!) 163/103 98 --   01/23/23 2003 -- -- 98 %           General: Well developed, Well nourished, and No distress  HEENT: No thyromegaly, Pupils equal, Round, and Reactive to light  Lungs: Clear to auscultation bilaterally, Normal breath sounds, and Normal respiratory rate  Cardiac: Regular rate, Regular rhythm, and No Murmur  Abdomen: No hepatosplenomegaly and Gravid and non tender  Pelvic:  deferred  Extremities: No visible lesions, No edema, and No calf tenderness  Neurologic: alert and oriented x 4, CN grossly intact, Non focal neurologic exam, and DTR: REFLEXES: 2+    NST: Reassuring for gestational age    Lab Results   Component Value Date/Time    ABO/Rh(D) A POSITIVE 10/08/2020 08:44 AM          Labs:   Recent Results (from the past 24 hour(s))   CBC WITH AUTOMATED DIFF    Collection Time: 01/23/23  7:35 PM   Result Value Ref Range    WBC 11.1 (H) 3.6 - 11.0 K/uL    RBC 3.98 3.80 - 5.20 M/uL    HGB 10.6 (L) 11.5 - 16.0 g/dL    HCT 33.7 (L) 35.0 - 47.0 %    MCV 84.7 80.0 - 99.0 FL    MCH 26.6 26.0 - 34.0 PG    MCHC 31.5 30.0 - 36.5 g/dL    RDW 15.5 (H) 11.5 - 14.5 %    PLATELET 947 937 - 390 K/uL    MPV 12.4 8.9 - 12.9 FL    NRBC 1.1 (H) 0  WBC    ABSOLUTE NRBC 0.12 (H) 0.00 - 0.01 K/uL    NEUTROPHILS 79 (H) 32 - 75 %    LYMPHOCYTES 13 12 - 49 %    MONOCYTES 7 5 - 13 %    EOSINOPHILS 0 0 - 7 %    BASOPHILS 0 0 - 1 %    IMMATURE GRANULOCYTES 1 (H) 0.0 - 0.5 %    ABS.  NEUTROPHILS 8.7 (H) 1.8 - 8.0 K/UL    ABS. LYMPHOCYTES 1.5 0.8 - 3.5 K/UL    ABS. MONOCYTES 0.8 0.0 - 1.0 K/UL    ABS. EOSINOPHILS 0.0 0.0 - 0.4 K/UL    ABS. BASOPHILS 0.0 0.0 - 0.1 K/UL    ABS. IMM. GRANS. 0.1 (H) 0.00 - 0.04 K/UL    DF AUTOMATED     METABOLIC PANEL, COMPREHENSIVE    Collection Time: 23  7:35 PM   Result Value Ref Range    Sodium 137 136 - 145 mmol/L    Potassium 4.5 3.5 - 5.1 mmol/L    Chloride 104 97 - 108 mmol/L    CO2 24 21 - 32 mmol/L    Anion gap 9 5 - 15 mmol/L    Glucose 127 (H) 65 - 100 mg/dL    BUN 8 6 - 20 MG/DL    Creatinine 0.70 0.55 - 1.02 MG/DL    BUN/Creatinine ratio 11 (L) 12 - 20      eGFR >60 >60 ml/min/1.73m2    Calcium 8.8 8.5 - 10.1 MG/DL    Bilirubin, total 0.3 0.2 - 1.0 MG/DL    ALT (SGPT) 19 12 - 78 U/L    AST (SGOT) 28 15 - 37 U/L    Alk. phosphatase 104 45 - 117 U/L    Protein, total 6.2 (L) 6.4 - 8.2 g/dL    Albumin 2.6 (L) 3.5 - 5.0 g/dL    Globulin 3.6 2.0 - 4.0 g/dL    A-G Ratio 0.7 (L) 1.1 - 2.2     PROTEIN/CREATININE RATIO, URINE    Collection Time: 23  7:35 PM   Result Value Ref Range    Protein, urine random 69 (H) 0.0 - 11.9 mg/dL    Creatinine, urine random 161.00 mg/dL    Protein/Creat. urine Ratio 0.4           Assessment and Plan:     32 y.o.  at 25 4/7 weeks Di/Di twin pregnancy and A1GDM and CHTN now with severe range blood pressure and elevated P/C.  CHTN with superimposed preeclampsia w/ severe features vs poorly controlled hypertension.   -labetalol 200mg PO BID   -Procardia antihypertensive protocol to control blood pressure   -Preeclamptic labs   -Diabetic diet   -POC glucose after meals and qHS  Hypothyroid- Continue Levothyroxine  Anxiety D/O- Zoloft 75mg PO Qd        Signed By:  Francis Ambrocio MD     2023 8:38 PM

## 2023-01-24 NOTE — PROGRESS NOTES
Discussed case with ANNE Amin. Recommends continued protocol antihypertensive medications and increasing oral dose of Labetalol. Also recommends BMZ at this time.     Marya Mccray MD

## 2023-01-24 NOTE — PROGRESS NOTES
High Risk Obstetrics Progress Note    Name: Sindhu Gonzalez MRN: [id number]  SSN: [id number]    YOB: 1995  Age: 32 y.o. Sex: female      Subjective:      LOS: 0 days    Estimated Date of Delivery: 23   Gestational Age Today: 25w10d     Patient admitted for  [facility] with severe range BPs. Also with twin IUP. States she does have normal fetal movement and does not have abdominal pain  , headache , and visual disturbances. Objective:     Vitals:  Blood pressure 131/65, pulse (!) 114, temperature 98.5 °F (36.9 °C), resp. rate 17, height 5' (1.524 m), weight 101.2 kg (223 lb), SpO2 98 %, unknown if currently breastfeeding. Temp (24hrs), Av.5 °F (36.9 °C), Min:98.5 °F (36.9 °C), Max:98.5 °F (43.1 °C)    Systolic (38LLK), IJY:002 , Min:82 , SV      Diastolic (30YAC), QYW:23, Min:50, Max:107       Intake and Output:         Physical Exam:  Patient without distress. Abdomen: soft, nontender  Fundus: soft and non tender  Lower Extremities:  - Edema trace   - No cords or calf tenderness. Membranes:  Intact    Uterine Activity:  None    Fetal Heart Rate:  has been wnl        Labs:   Recent Results (from the past 36 hour(s))   CBC WITH AUTOMATED DIFF    Collection Time: 23  7:35 PM   Result Value Ref Range    WBC 11.1 (H) 3.6 - 11.0 K/uL    RBC 3.98 3.80 - 5.20 M/uL    HGB 10.6 (L) 11.5 - 16.0 g/dL    HCT 33.7 (L) 35.0 - 47.0 %    MCV 84.7 80.0 - 99.0 FL    MCH 26.6 26.0 - 34.0 PG    MCHC 31.5 30.0 - 36.5 g/dL    RDW 15.5 (H) 11.5 - 14.5 %    PLATELET 261 026 - 679 K/uL    MPV 12.4 8.9 - 12.9 FL    NRBC 1.1 (H) 0  WBC    ABSOLUTE NRBC 0.12 (H) 0.00 - 0.01 K/uL    NEUTROPHILS 79 (H) 32 - 75 %    LYMPHOCYTES 13 12 - 49 %    MONOCYTES 7 5 - 13 %    EOSINOPHILS 0 0 - 7 %    BASOPHILS 0 0 - 1 %    IMMATURE GRANULOCYTES 1 (H) 0.0 - 0.5 %    ABS. NEUTROPHILS 8.7 (H) 1.8 - 8.0 K/UL    ABS. LYMPHOCYTES 1.5 0.8 - 3.5 K/UL    ABS. MONOCYTES 0.8 0.0 - 1.0 K/UL    ABS.  EOSINOPHILS 0.0 0.0 - 0.4 K/UL    ABS. BASOPHILS 0.0 0.0 - 0.1 K/UL    ABS. IMM. GRANS. 0.1 (H) 0.00 - 0.04 K/UL    DF AUTOMATED     METABOLIC PANEL, COMPREHENSIVE    Collection Time: 01/23/23  7:35 PM   Result Value Ref Range    Sodium 137 136 - 145 mmol/L    Potassium 4.5 3.5 - 5.1 mmol/L    Chloride 104 97 - 108 mmol/L    CO2 24 21 - 32 mmol/L    Anion gap 9 5 - 15 mmol/L    Glucose 127 (H) 65 - 100 mg/dL    BUN 8 6 - 20 MG/DL    Creatinine 0.70 0.55 - 1.02 MG/DL    BUN/Creatinine ratio 11 (L) 12 - 20      eGFR >60 >60 ml/min/1.73m2    Calcium 8.8 8.5 - 10.1 MG/DL    Bilirubin, total 0.3 0.2 - 1.0 MG/DL    ALT (SGPT) 19 12 - 78 U/L    AST (SGOT) 28 15 - 37 U/L    Alk. phosphatase 104 45 - 117 U/L    Protein, total 6.2 (L) 6.4 - 8.2 g/dL    Albumin 2.6 (L) 3.5 - 5.0 g/dL    Globulin 3.6 2.0 - 4.0 g/dL    A-G Ratio 0.7 (L) 1.1 - 2.2     PROTEIN/CREATININE RATIO, URINE    Collection Time: 01/23/23  7:35 PM   Result Value Ref Range    Protein, urine random 69 (H) 0.0 - 11.9 mg/dL    Creatinine, urine random 161.00 mg/dL    Protein/Creat. urine Ratio 0.4     GLUCOSE, POC    Collection Time: 01/24/23  5:53 AM   Result Value Ref Range    Glucose (POC) 145 (H) 65 - 117 mg/dL    Performed by Shazia Hinkle        Assessment and Plan: Twin IUP at 25 5/7 weeks with [facility] and severe range BPs on admisstion. BPs better controlled at present. Continue current care. Active Problems:    Panic anxiety syndrome (8/1/2013)      Overview: Dr. Artie Frey      Twin pregnancy (1/23/2023)      Chronic hypertension complicating or reason for care during pregnancy, second trimester (1/23/2023)      Acquired hypothyroidism (1/23/2023)      Obesity in pregnancy (1/24/2023)       Twin Pregnancy:  stable  CHTN with elevated BPs, better controlled at present.

## 2023-01-24 NOTE — PROGRESS NOTES
1/24/2023  9:32 AM    Medicare Outpatient Observation Notice (MOON)/ [place] Outpatient Observation Notice (Lorne Carter) provided to patient/representative with verbal explanation of the notice. Time allotted for questions regarding the notice.       Aleksandra Ashby

## 2023-01-24 NOTE — PROGRESS NOTES
0091: Bedside and Verbal shift change report given to [address] (oncoming nurse) by Anna Edwards (offgoing nurse). Report included the following information SBAR, Intake/Output, MAR, Recent Results, and Quality Measures. Assuming care of pt at this time. 0920: MD notified of bp. MD also gave orders for zofran 8mg TID prn.    1000: notified MD regarding pt being 25.5 wks. If pt were to need to deliver pt would need to be transferred out. 1100: pt taken to [facility]. Pt in stable condition. 1206: pt returned to unit by primary RN. MD agrees to have pt go to cafeteria in wheelchair and ambulate on the unit. Will continue to monitor. 1440: MD notified of pt's high bg. New orders include diabetes diet and recheck post prandial bg after dinner. 1500: Primary RN gave education to pt regarding diabetic diet. 1906: Verbal shift change report given to Jessika Ramirez (oncoming nurse) by Phong Vieira RN (offgoing nurse). Report included the following information SBAR, Intake/Output, MAR, Recent Results, and Quality Measures. Care of pt relinquished at this time.

## 2023-01-25 PROBLEM — Z34.90 PREGNANCY: Status: ACTIVE | Noted: 2023-01-25

## 2023-01-25 LAB
GLUCOSE BLD STRIP.AUTO-MCNC: 137 MG/DL (ref 65–117)
GLUCOSE BLD STRIP.AUTO-MCNC: 138 MG/DL (ref 65–117)
GLUCOSE BLD STRIP.AUTO-MCNC: 159 MG/DL (ref 65–117)
GLUCOSE BLD STRIP.AUTO-MCNC: 187 MG/DL (ref 65–117)
SERVICE CMNT-IMP: ABNORMAL

## 2023-01-25 PROCEDURE — 74011250637 HC RX REV CODE- 250/637: Performed by: OBSTETRICS & GYNECOLOGY

## 2023-01-25 PROCEDURE — 65270000029 HC RM PRIVATE

## 2023-01-25 PROCEDURE — 74011000250 HC RX REV CODE- 250: Performed by: OBSTETRICS & GYNECOLOGY

## 2023-01-25 PROCEDURE — 82962 GLUCOSE BLOOD TEST: CPT

## 2023-01-25 PROCEDURE — G0378 HOSPITAL OBSERVATION PER HR: HCPCS

## 2023-01-25 RX ORDER — ONDANSETRON 4 MG/1
4 TABLET, ORALLY DISINTEGRATING ORAL
Status: DISCONTINUED | OUTPATIENT
Start: 2023-01-25 | End: 2023-01-26 | Stop reason: HOSPADM

## 2023-01-25 RX ORDER — ZOLPIDEM TARTRATE 5 MG/1
5 TABLET ORAL
Status: DISCONTINUED | OUTPATIENT
Start: 2023-01-25 | End: 2023-01-26 | Stop reason: HOSPADM

## 2023-01-25 RX ORDER — SODIUM CHLORIDE 0.9 % (FLUSH) 0.9 %
5-40 SYRINGE (ML) INJECTION AS NEEDED
Status: DISCONTINUED | OUTPATIENT
Start: 2023-01-25 | End: 2023-01-26 | Stop reason: HOSPADM

## 2023-01-25 RX ORDER — LABETALOL 200 MG/1
400 TABLET, FILM COATED ORAL EVERY 12 HOURS
Status: DISCONTINUED | OUTPATIENT
Start: 2023-01-25 | End: 2023-01-26 | Stop reason: HOSPADM

## 2023-01-25 RX ORDER — SODIUM CHLORIDE 0.9 % (FLUSH) 0.9 %
5-40 SYRINGE (ML) INJECTION EVERY 8 HOURS
Status: DISCONTINUED | OUTPATIENT
Start: 2023-01-25 | End: 2023-01-26 | Stop reason: HOSPADM

## 2023-01-25 RX ADMIN — SODIUM CHLORIDE, PRESERVATIVE FREE 10 ML: 5 INJECTION INTRAVENOUS at 21:08

## 2023-01-25 RX ADMIN — LABETALOL HYDROCHLORIDE 400 MG: 200 TABLET, FILM COATED ORAL at 21:08

## 2023-01-25 RX ADMIN — Medication 1 TABLET: at 09:19

## 2023-01-25 RX ADMIN — ZOLPIDEM TARTRATE 5 MG: 5 TABLET ORAL at 21:08

## 2023-01-25 RX ADMIN — LABETALOL HYDROCHLORIDE 300 MG: 200 TABLET, FILM COATED ORAL at 09:19

## 2023-01-25 RX ADMIN — ASPIRIN 81 MG: 81 TABLET, CHEWABLE ORAL at 09:20

## 2023-01-25 NOTE — PROGRESS NOTES
Went to see patient in regard to her strong desire for discharge. I explained the concerns related to her blood pressures and reiterated recommendation by primary Ob team and MFM to maintain Bps less than 140/90 prior to discharge home. Labetalol dose increased to 400mg po q 12 hrs. Reassurance and support given. Recommended pt get out of the room as she has not been out all day today. She will consider.       eBka Gilbert DO, [address] [facility],[address] [facility]

## 2023-01-25 NOTE — PROGRESS NOTES
Patient called [facility] [address] [facility], regarding why she isn't able to be discharged from the hospital. Dr. Ama Irwin notified, and this nurse instructed to call L&D and speak with primary nurse so they can let Primary OB know of patients concerns. Zarina Valadez RN on L&D notified of patient call.

## 2023-01-25 NOTE — PROGRESS NOTES
0710: Bedside and Verbal shift change report given to [address] (oncoming nurse) by Radha Stewart RN (offgoing nurse). Report included the following information SBAR, Intake/Output, MAR, and Quality Measures. Assuming care of pt this time. 1800: blood pressure cuff switched with larger one. Will repeat pressures. 1912: Verbal shift change report given to Capo Mendoza RN (oncoming nurse) by [address] (offgoing nurse). Report included the following information SBAR, Intake/Output, MAR, Recent Results, and Quality Measures. Care of pt relinquished at this time.

## 2023-01-25 NOTE — PROGRESS NOTES
1910 Bedside and Verbal shift change report given to [address] (oncoming nurse) by Abdullahi Rubin (offgoing nurse). Report included the following information SBAR, Kardex, Intake/Output, MAR, Accordion, and Recent Results. 0715 Bedside and Verbal shift change report given to Abdullahi Rubin (oncoming nurse) by [address] [place] [address] (offgoing nurse). Report included the following information SBAR, Kardex, Intake/Output, MAR, and Recent Results.

## 2023-01-25 NOTE — PROGRESS NOTES
[facility]tePart[facility] Progress Note    Chelsy Crocker  05P0W    Patient admitted for diabetes - gestational, elevated blood pressure in physician's office , and twins 23w6d Estimated Date of Delivery: 23 states she does not  have  headache , abdominal pain  , and right upper quadrant pain  . Vitals:  Patient Vitals for the past 24 hrs:   BP Temp Pulse Resp SpO2   23 0623 (!) 150/89 98.7 °F (37.1 °C) (!) 104 16 99 %   23 0140 138/75 98 °F (36.7 °C) 90 16 99 %   23 2107 136/77 -- 96 -- --   23 2106 136/77 -- 97 -- --   23 1939 (!) 145/87 99.1 °F (37.3 °C) (!) 102 16 99 %   23 1929 138/89 -- 98 -- --   23 1919 (!) 140/103 -- (!) 107 -- --   23 1820 129/80 -- (!) 107 -- --   23 1447 118/71 -- -- -- --   23 1032 135/77 -- (!) 102 -- --   23 0919 (!) 153/100 98.4 °F (36.9 °C) (!) 110 -- --     Temp (24hrs), Av.6 °F (37 °C), Min:98 °F (36.7 °C), Max:99.1 °F (37.3 °C)    I&O:   No intake/output data recorded. No intake/output data recorded.   NST:  not yet done  Uterine Activity: n/a    Exam:  Patient: comfortable without distress               Abdomen soft, gravid, NT               Fundus NT               Lower extremities NT without edema                                         Labs:   Recent Results (from the past 24 hour(s))   GLUCOSE, POC    Collection Time: 23 10:36 AM   Result Value Ref Range    Glucose (POC) 136 (H) 65 - 117 mg/dL    Performed by Zay Cai ([place])    GLUCOSE, POC    Collection Time: 23  2:31 PM   Result Value Ref Range    Glucose (POC) 192 (H) 65 - 117 mg/dL    Performed by Zay Mckinney)    GLUCOSE, POC    Collection Time: 23  8:48 PM   Result Value Ref Range    Glucose (POC) 120 (H) 65 - 117 mg/dL    Performed by Miles Polanco    GLUCOSE, POC    Collection Time: 23  6:21 AM   Result Value Ref Range    Glucose (POC) 137 (H) 65 - 117 mg/dL    Performed by Miles Polanco Assessment and Plan:   Di-di twin IUP @ 23w6d with CHTN exacerbation vs preE with severe features  1. OB stable. 2. BP med: labetalol 300mg po q 12 hrs. Plan to titrate to keep BP under 140/90 consistently prior to discharge home. No si/sy preE currently. 3.  GDMA1; monitor BS fasting and PP, diabetic diet. 4.  SCD for DVT prophylaxis  5. Next MFM appt on Friday  6.   BMZ 2/2 at 2100 1/24    Pallavi Berrios DO, [address] [facility],[address] [facility]

## 2023-01-26 VITALS
WEIGHT: 223 LBS | HEIGHT: 60 IN | BODY MASS INDEX: 43.78 KG/M2 | DIASTOLIC BLOOD PRESSURE: 88 MMHG | OXYGEN SATURATION: 97 % | TEMPERATURE: 97.9 F | RESPIRATION RATE: 20 BRPM | SYSTOLIC BLOOD PRESSURE: 142 MMHG | HEART RATE: 103 BPM

## 2023-01-26 LAB
GLUCOSE BLD STRIP.AUTO-MCNC: 116 MG/DL (ref 65–117)
GLUCOSE BLD STRIP.AUTO-MCNC: 132 MG/DL (ref 65–117)
GLUCOSE BLD STRIP.AUTO-MCNC: 134 MG/DL (ref 65–117)
SERVICE CMNT-IMP: ABNORMAL
SERVICE CMNT-IMP: ABNORMAL
SERVICE CMNT-IMP: NORMAL

## 2023-01-26 PROCEDURE — 74011000250 HC RX REV CODE- 250: Performed by: OBSTETRICS & GYNECOLOGY

## 2023-01-26 PROCEDURE — 74011250637 HC RX REV CODE- 250/637: Performed by: OBSTETRICS & GYNECOLOGY

## 2023-01-26 PROCEDURE — 82962 GLUCOSE BLOOD TEST: CPT

## 2023-01-26 PROCEDURE — 99285 EMERGENCY DEPT VISIT HI MDM: CPT

## 2023-01-26 RX ORDER — LABETALOL 200 MG/1
400 TABLET, FILM COATED ORAL EVERY 12 HOURS
Qty: 120 TABLET | Refills: 2 | Status: SHIPPED | OUTPATIENT
Start: 2023-01-26 | End: 2023-02-04

## 2023-01-26 RX ADMIN — ASPIRIN 81 MG: 81 TABLET, CHEWABLE ORAL at 10:13

## 2023-01-26 RX ADMIN — LABETALOL HYDROCHLORIDE 400 MG: 200 TABLET, FILM COATED ORAL at 10:13

## 2023-01-26 RX ADMIN — FOLIC ACID 1 MG: 1 TABLET ORAL at 10:13

## 2023-01-26 RX ADMIN — Medication 1 TABLET: at 10:13

## 2023-01-26 RX ADMIN — SODIUM CHLORIDE, PRESERVATIVE FREE 10 ML: 5 INJECTION INTRAVENOUS at 11:46

## 2023-01-26 NOTE — PROGRESS NOTES
7:10 PM  SBAR report received from Graham Myers RN. Assumed care of the patient at this time. 7:13 AM  Bedside SBAR report given to Lynsey Jaramillo RN. Care of the patient turned over.

## 2023-01-26 NOTE — DISCHARGE SUMMARY
Patient ID:  Coy Midland  [id number]  36 y.o.  1995    Admit Date: 2023    Discharge Date: 2023     Admitting Physician: Astrid Baldwin DO    Attending Physician: Dalton Hinojosa DO    Admission Diagnoses: Chronic hypertension complicating or reason for care during pregnancy, second trimester [O10.912]  Twin pregnancy [O30.009]  Pregnancy [Z34.90]  Obesity  GDM  Fetal growth restriction     Procedures for this admission: Monitoring of BP, administration of  steroids, titration of antihypertensives, MFM consultation     Discharge Diagnoses: Same as above      Discharge Disposition:  Home    Discharge Condition:  Stable    Additional Diagnoses:  Same as above  . History of Present Illness:   OB History          4    Para   0    Term                AB   3    Living             SAB   3    IAB        Ectopic        Molar        Multiple        Live Births                  Admitted for  Management of worsening CHTN . Hospital Course:   Patient was admitted and monitored for worsening of CHTN. Her labs were consistent with worsening of CHTN. CBC and CMP WNL. Pr:Cr 0.4 which is similar to her baseline. She received BMZ x 2. Her labetalol was increased to 400mg po BID with good control, -140s, SBP 80-90s. She was asking for discharge home on  and was encouraged to stay and monitor after labetalol adjustment, on  her BPs were improved. She remains asymptomatic. Fetal surveillance was reassuring throughout her admission. Follow-up Care: She was advised to keep MFM apt tomorrow and see Dr. Merlin Espy on 23 at 1030. Advised to stay out of work at this time.            Signed:  Patrizia De La Cruz DO  2023  4:35 PM

## 2023-01-26 NOTE — DISCHARGE INSTRUCTIONS
High Blood Pressure in Pregnancy: Care Instructions  Overview     High blood pressure (hypertension) means that the force of blood against your artery walls is too strong. High blood pressure problems during pregnancy include:  Chronic hypertension. This is high blood pressure that starts before pregnancy. Gestational hypertension. This is high blood pressure that starts in the second or third trimester of pregnancy. Preeclampsia. This is a problem that includes high blood pressure and signs of organ injury during pregnancy. In some cases, it leads to eclampsia. Eclampsia causes seizures. High blood pressure during pregnancy can affect the amount of oxygen and nutrients your baby receives. This can affect how your baby grows. High blood pressure can also cause other serious problems for both you and your baby, such as placental abruption. To prevent problems, you and your baby will be watched very closely. You will have to check your blood pressure often during pregnancy and possibly after pregnancy. If your blood pressure rises suddenly or is very high during your pregnancy, your doctor may prescribe medicines. They can usually control blood pressure. If your blood pressure affects your or your baby's health, your doctor may need to deliver your baby early. After your baby is born, your blood pressure will probably improve. But sometimes blood pressure problems continue after birth. Follow-up care is a key part of your treatment and safety. Be sure to make and go to all appointments, and call your doctor if you are having problems. It's also a good idea to know your test results and keep a list of the medicines you take. How can you care for yourself at home? Take and write down your blood pressure at home if your doctor says to. Take your medicines exactly as prescribed. Call your doctor if you think you are having a problem with your medicine. Do not smoke.  This is one of the best things you can do to help your baby be healthy. If you need help quitting, talk to your doctor about stop-smoking programs and medicines. These can increase your chances of quitting for good. Don't gain too much weight during pregnancy. Talk to your doctor about how much weight gain is healthy. Eat a healthy diet. If your doctor says it's okay, get regular exercise. Walking or swimming several times a week can be healthy for you and your baby. Reduce stress, and find time to relax. This is very important if you continue to work or have a busy schedule. It's also important if you have small children at home. When should you call for help? Share this information with your partner or a friend. They can help you watch for warning signs. Call 911  anytime you think you may need emergency care. For example, call if:    You passed out (lost consciousness). You have a seizure. Call your doctor now or seek immediate medical care if:    You have symptoms of preeclampsia, such as:  Sudden swelling of your face, hands, or feet. New vision problems (such as dimness, blurring, or seeing spots). A severe headache. Your blood pressure is very high, such as 160/110 or higher. Your blood pressure is higher than your doctor told you it should be, or it rises quickly. You have new nausea or vomiting. You think that you are in labor. You have pain in your belly or pelvis. Watch closely for changes in your health, and be sure to contact your doctor if:    You gain weight rapidly. Where can you learn more? Go to http://www.gray.com/  Enter A052 in the search box to learn more about \"High Blood Pressure in Pregnancy: Care Instructions. \"  Current as of: February 23, 2022               Content Version: 13.4  © 3247-3525 [employer]. Care instructions adapted under license by [employer] (which disclaims liability or warranty for this information).  If you have questions about a medical condition or this instruction, always ask your healthcare professional. [address] any warranty or liability for your use of this information. Gestational Diabetes Diet: Care Instructions  Overview     Gestational diabetes is a form of diabetes that can happen during pregnancy. It usually goes away after the baby is born. Gestational diabetes occurs when your body does not use insulin properly. Insulin helps sugar enter your cells, where it is used for energy. You may be able to manage your blood sugar while you are pregnant by eating a healthy diet and getting regular exercise. A dietitian or diabetes educator can help you make a food plan. This plan will help manage your blood sugar and provide good nutrition for you and your baby. If diet and exercise don't help keep your blood sugar in target range, you may need diabetes medicine or insulin. Follow-up care is a key part of your treatment and safety. Be sure to make and go to all appointments, and call your doctor if you are having problems. It's also a good idea to know your test results and keep a list of the medicines you take. How can you care for yourself at home? Learn which foods have carbohydrate. Eating too much carbohydrate will cause your blood sugar to go too high. Carbohydrate foods include:  Breads, cereals, pasta, and rice. Dried beans and starchy vegetables, like corn, peas, and potatoes. Fruits and fruit juice, milk, and yogurt. Candy, table sugar, soda pop, and drinks sweetened with sugar. Learn how much carbohydrate you need at meals and snacks. A dietitian or diabetes educator can teach you how to keep track of how much carbohydrate you eat. Limit foods that have added sugar. This includes candy, desserts, and soda pop. These foods need to be counted as part of your total carbohydrate intake for the day. Do not drink alcohol. Alcohol is not safe for you or your baby.   Do not skip meals. Your blood sugar may drop too low if you skip meals and use insulin. Write down what you eat every day. Review your record with your dietitian or diabetes educator to see if you are eating the right amounts of foods. Check your blood sugar first thing in the morning before you eat. Then check your blood sugar 1 to 2 hours after the first bite of each meal (or as your doctor recommends). This will help you see how the food you eat affects your blood sugar. Keep track of these levels. Share the record with your doctor. When should you call for help? Watch closely for changes in your health, and be sure to contact your doctor if:    You have questions about your diet. You often have problems with high or low blood sugar. Where can you learn more? Go to http://www.gray.com/  Enter M291 in the search box to learn more about \"Gestational Diabetes Diet: Care Instructions. \"  Current as of: October 6, 2021               Content Version: 13.4  © 2006-2022 [employer]. Care instructions adapted under license by [employer] (which disclaims liability or warranty for this information). If you have questions about a medical condition or this instruction, always ask your healthcare professional. [place] [address] any warranty or liability for your use of this information. Learning About Twin Pregnancy  What is different about a twin pregnancy? In many ways, a twin pregnancy is like a single-baby pregnancy. Healthy twins develop like a single baby does until the last trimester, when their growth slows down. Twins are usually born before the usual 40-week due date. For the mother, carrying twins can be more difficult than carrying a single baby. And her risks are higher for pregnancy problems. That's why keeping up with prenatal checks and tests is especially important. How do twins grow?   Twins develop from embryos to babies like a single baby does. By weeks 10 to 14 of your pregnancy, one or two placentas have formed inside your uterus. It is possible to hear your babies' heartbeats with a special ultrasound device. Your babies' eyes can move. Their arms and legs can bend. Around weeks 14 to 18, hair is beginning to grow on your babies' heads. By 18 to 22 weeks, your babies can now suck their thumbs. Around this time, you may start to feel your babies move. At first, this might feel like fluttering or \"butterflies. \"  Around week 26, your babies can open and close their eyelids. You may notice that they respond to the sound of your or your partner's voice. You may also notice that they do less turning and twisting and more squirming. Up to 32 weeks, twins grow rapidly. By this point, they really start to look like babies, with hair and plump skin. Around 32 to 34 weeks, twins' pace of growth slows down. Their lungs become more ready for breathing. This marks a stage when babies born early are less likely to have breathing problems after birth. What can you expect during a twin pregnancy? With a twin pregnancy, your body makes high levels of pregnancy hormones. So morning sickness may come on earlier and stronger than if you were carrying a single baby. You may also have earlier and more intense symptoms from pregnancy, like swelling, heartburn, leg cramps, bladder discomfort, and sleep problems. Your belly may grow bigger, and you may gain more weight, sooner. Talk to your doctor about how much you might expect to gain. When you're carrying twins, you and your babies may be tested and checked more than you would for a single-baby pregnancy. At about 10 weeks, an ultrasound can show if the babies are growing in the same amniotic sac. If they each have their own sac and their own placenta, twins have the best chances of both growing well. Between weeks 18 and 20, an ultrasound may be able to show the sex of your babies.   Later in your pregnancy, you will start to have checkups more often. This will be sooner than for a single-baby pregnancy. Twins tend to be born early, but 37 weeks is a goal when mother and babies are doing well. As you get closer to delivery time, your medical team will help you know what to expect and what to do. As questions come to mind, keep a list so you can remember to ask your doctor. Follow-up care is a key part of your treatment and safety. Be sure to make and go to all appointments, and call your doctor if you are having problems. It's also a good idea to know your test results and keep a list of the medicines you take. Where can you learn more? Go to http://www.gray.com/  Enter R987 in the search box to learn more about \"Learning About Twin Pregnancy. \"  Current as of: 2022               Content Version: 13.4   [employer]. Care instructions adapted under license by [employer] (which disclaims liability or warranty for this information). If you have questions about a medical condition or this instruction, always ask your healthcare professional. [place] [address] any warranty or liability for your use of this information. Pregnancy Precautions: Care Instructions  Your Care Instructions     There is no sure way to prevent labor before your due date ( labor) or to prevent most other pregnancy problems. But there are things you can do to increase your chances of a healthy pregnancy. Go to your appointments, follow your doctor's advice, and take good care of yourself. Eat well, and exercise (if your doctor agrees). And make sure to drink plenty of water. Follow-up care is a key part of your treatment and safety. Be sure to make and go to all appointments, and call your doctor if you are having problems. It's also a good idea to know your test results and keep a list of the medicines you take.   How can you care for yourself at home? Make sure you go to your prenatal appointments. At each visit, your doctor will check your blood pressure. Your doctor will also check to see if you have protein in your urine. High blood pressure and protein in urine are signs of preeclampsia. This condition can be dangerous for you and your baby. Drink plenty of fluids. Dehydration can cause contractions. If you have kidney, heart, or liver disease and have to limit fluids, talk with your doctor before you increase the amount of fluids you drink. Tell your doctor right away if you notice any symptoms of an infection, such as:  Burning when you urinate. A foul-smelling discharge from your vagina. Vaginal itching. Unexplained fever. Unusual pain or soreness in your uterus or lower belly. Eat a balanced diet. Include plenty of foods that are high in calcium and iron. Foods high in calcium include milk, cheese, yogurt, almonds, and broccoli. Foods high in iron include red meat, shellfish, poultry, eggs, beans, raisins, whole-grain bread, and leafy green vegetables. Do not smoke. If you need help quitting, talk to your doctor about stop-smoking programs and medicines. These can increase your chances of quitting for good. Do not drink alcohol or use marijuana or illegal drugs. Follow your doctor's directions about activity. Your doctor will let you know how much, if any, exercise you can do. Ask your doctor if you can have sex. If you are at risk for early labor, your doctor may ask you to not have sex. Take care to prevent falls. During pregnancy, your joints are loose, and your balance is off. Sports such as bicycling, skiing, or in-line skating can increase your risk of falling. And don't ride horses or motorcycles, dive, water ski, scuba dive, or parachute jump while you are pregnant. Avoid things that can make your body too hot and may be harmful to your baby, such as a hot tub or sauna.  Or talk with your doctor before doing anything that raises your body temperature. Your doctor can tell you if it's safe. Do not take any over-the-counter or herbal medicines or supplements without talking to your doctor or pharmacist first.  When should you call for help? Call 911  anytime you think you may need emergency care. For example, call if:    You passed out (lost consciousness). You have a seizure. You have severe vaginal bleeding. You have severe pain in your belly or pelvis. You have had fluid gushing or leaking from your vagina and you know or think the umbilical cord is bulging into your vagina. If this happens, immediately get down on your knees so your rear end (buttocks) is higher than your head. This will decrease the pressure on the cord until help arrives. Call your doctor now or seek immediate medical care if:    You have signs of preeclampsia, such as:  Sudden swelling of your face, hands, or feet. New vision problems (such as dimness, blurring, or seeing spots). A severe headache. You have any vaginal bleeding. You have belly pain or cramping. You have a fever. You have had regular contractions (with or without pain) for an hour. This means that you have 8 or more within 1 hour or 4 or more in 20 minutes after you change your position and drink fluids. You have a sudden release of fluid from your vagina. You have low back pain or pelvic pressure that does not go away. You notice that your baby has stopped moving or is moving much less than normal.   Watch closely for changes in your health, and be sure to contact your doctor if you have any problems. Where can you learn more? Go to http://www.BrightQube.com/  Enter Y951 in the search box to learn more about \"Pregnancy Precautions: Care Instructions. \"  Current as of: February 23, 2022               Content Version: 13.4  © 3848-8747 [employer].    Care instructions adapted under license by [facility] (which disclaims liability or warranty for this information). If you have questions about a medical condition or this instruction, always ask your healthcare professional. [address] any warranty or liability for your use of this information. Counting Your Baby's Kicks: Care Instructions  Overview     Counting your baby's kicks is one way your doctor can tell that your baby is healthy. Most women--especially in a first pregnancy--feel their baby move for the first time between 16 and 22 weeks. The movement may feel like flutters rather than kicks. Your baby may move more at certain times of the day. When you are active, you may notice less kicking than when you are resting. At your prenatal visits, your doctor will ask whether the baby is active. In your last trimester, your doctor may ask you to count the number of times you feel your baby move. Follow-up care is a key part of your treatment and safety. Be sure to make and go to all appointments, and call your doctor if you are having problems. It's also a good idea to know your test results and keep a list of the medicines you take. How do you count fetal kicks? A common method of checking your baby's movement is to note the length of time it takes to count ten movements (such as kicks, flutters, or rolls). Pick your baby's most active time of day to count. This may be any time from morning to evening. If you don't feel 10 movements in an hour, have something to eat or drink and count for another hour. If you don't feel at least 10 movements in the 2-hour period, call your doctor. When should you call for help? Call your doctor now or seek immediate medical care if:    You noticed that your baby has stopped moving or is moving much less than normal.   Watch closely for changes in your health, and be sure to contact your doctor if you have any problems. Where can you learn more?   Go to http://www.gray.com/  Enter [id number] in the search box to learn more about \"Counting Your Baby's Kicks: Care Instructions. \"  Current as of: February 23, 2022               Content Version: 13.4  © 6877-3494 [employer]. Care instructions adapted under license by [employer] (which disclaims liability or warranty for this information). If you have questions about a medical condition or this instruction, always ask your healthcare professional. [place] [address] any warranty or liability for your use of this information.

## 2023-01-26 NOTE — PROGRESS NOTES
4579 Assumed care of patient. 0915 Resting quietly in bed. Agreeable for this writer to return to bedside in one hour. Family at bedside. 1020 Denies pain or discomforts. Reports +fetal movement. Denies H/A, visual disturbances/changes, N/V, RUQ pain. No vaginal discharge or bleeding. Opportunity provided to ask questions or voice any concerns. No increased edema. Call bell in reach. [phone] Discharge instructions reviewed with patient, verbalizes understanding. Peripheral IV removed. Blood pressure cuff provided with instructions. Made aware of med changes and med available at preferred pharmacy. Aware to follow up with Dr. Shaq Green in office on 1/30. Patient has scheduled appointment with [facility] on 1/27. Patient again verbalizes understanding of all instructions. Ambulated off unit with spouse and voices no complaints.

## 2023-01-26 NOTE — PROGRESS NOTES
AntePartum Progress Note    Vladislav Luis  26w0d    Patient admitted for diabetes - gestational, elevated blood pressure in physician's office , and twins 34w0d Estimated Date of Delivery: 23 states she does not  have  headache , abdominal pain  , and right upper quadrant pain  . Feeling anxious, wants very much to be discharged home. Her mom is at bedside today and is a reassuring presence for her.      Vitals:  Patient Vitals for the past 24 hrs:   BP Temp Pulse Resp SpO2   23 0638 (!) 140/90 -- 100 -- --   23 2347 118/82 99.2 °F (37.3 °C) (!) 104 15 98 %   23 2108 134/87 -- (!) 104 -- --   23 1936 (!) 140/92 99.1 °F (37.3 °C) (!) 101 16 96 %   23 1846 (!) 143/93 -- 100 -- --   23 1836 (!) 139/92 -- 100 -- --   23 1826 (!) 141/84 -- 98 -- --   23 1816 (!) 146/84 -- (!) 101 -- --   23 1806 (!) 146/89 -- (!) 102 -- --   23 1804 129/81 -- (!) 101 -- --   23 1802 (!) 151/90 -- 98 -- --   23 1716 (!) 169/105 -- (!) 104 -- --   23 1704 (!) 151/91 -- (!) 109 -- --   23 1037 131/83 -- 100 -- --       Temp (24hrs), Av.2 °F (37.3 °C), Min:99.1 °F (37.3 °C), Max:99.2 °F (37.3 °C)    NST:  not yet done  Uterine Activity: n/a    Exam:  Patient: comfortable without distress               Abdomen soft, gravid, NT               Fundus NT               Lower extremities NT without edema                                         Labs:   Recent Results (from the past 24 hour(s))   GLUCOSE, POC    Collection Time: 23 10:38 AM   Result Value Ref Range    Glucose (POC) 138 (H) 65 - 117 mg/dL    Performed by Junior Torres ([place])    GLUCOSE, POC    Collection Time: 23  3:07 PM   Result Value Ref Range    Glucose (POC) 159 (H) 65 - 117 mg/dL    Performed by Junior Torres ([place])    GLUCOSE, POC    Collection Time: 23  9:14 PM   Result Value Ref Range    Glucose (POC) 187 (H) 65 - 117 mg/dL    Performed by Geovany Garrison GLUCOSE, POC    Collection Time: 01/26/23  6:40 AM   Result Value Ref Range    Glucose (POC) 134 (H) 65 - 117 mg/dL    Performed by Aftab Hoffman and Plan:   Di-di twin IUP @ 26w0d with CHTN exacerbation vs preE with severe features  1. OB stable. 2. BP med: labetalol 400mg po q 12 hrs. Plan to titrate to keep BP under 140/90 consistently prior to discharge home. No si/sy preE currently. -Will see how BPs are throughout the day, pt very much wants to be discharged home, if BPs 140/90 or less all day can consider  3. GDMA1; monitor BS fasting and PP, diabetic diet. 4.  Anxiety:  on home Zoloft, reassurance, encouraged family presence when possible for support, encouraged her to ambulate outside of the room today possibly go downstairs, get some fresh air   5. SCD for DVT prophylaxis  6. Next MFM appt on Friday  7. BMZ 2/2 at 2100 1/24    Carol Cortez [address]. .[place] [id number]     Patient has been feeling well throughout the day. BPs have been much improved -140, DBP 80-90s. One outlier of 152/100 that was right before her morning dose of Labetalol and improved on recheck in 1 hour. She is asking to go home. I think that is reasonable at this point. She agrees to check her BPs, continue labetalol at 400mg po BID. Has follow up apt with MFM tomorrow and will see me on Monday 1/30 at 1000. Advised to stay out of work for now and take it easy. Nurse asked to review Pre-E precautions with patient prior to discharge.      Carol Cortez DO

## 2023-01-26 NOTE — PROGRESS NOTES
Comprehensive Nutrition Assessment    Type and Reason for Visit: Initial, Positive nutrition screen - Gest DM, Twin Pregnancy, 2nd Trimester pre-eclampsia     Nutrition Recommendations/Plan:   Suggest Diet order of Regular, No Concentrated Sweets with Double protein portions and HS snack with 30g carbs    Obtain Measured Standing weight daily until stable & BP WDL    Consider adding sliding insulin scale to treat elevated BG or Consult Diabetes Program for Health      Malnutrition Assessment:  Malnutrition Status:  No malnutrition (01/26/23 1548)      Nutrition Assessment:      32year old 34w0d TWIN Gestation with Diabetes. Pt reports DM has been diet controlled, did attend DM education class but does not count carbohydrates, does eat small frequent meals, avoids simple sugars, likes fruit, drinks water well. She does check home BG, reporting \"hardly ever seeing >140\". Medications do not include sliding scale or any insulin coverage. Pt is getting steroid injections and may see some increases in BG. Pt reports pre-pregnancy weight of 212 lbs with no significant wt gain until the week prior to admission 2/2 fluid & high BP. Twin Gestation has higher calorie and protein requirements as assessed below. Recent POC Glucose 132, 116, 134, 187. POC gluc prior were higher possibly 2/2 steroid injections. RD visited patient and reviewed some diet parameters for better BG control, RD encouraged tighter BG control to help improve BP. Pt, RD and RN are unclear why patient diet was changed from Gest DM to Regular.  Pt currently ordering own meals without carb restrictions since 1/26 b'fast.     Current Regular diet provides: ~2320 kcal/d (90% needs), 250 g carb (140% needs), 99 g protein (78% needs)   RD recommended NCS diet order would provide: ~2060 kcals (~80% of needs), 210 g carb (120% of carbs), 97 g pro (76% of protein) if eating non-select diet    Nutrition Related Findings:      Wound Type: None  Last Bowel Movement Date: 01/25/23  Abdominal Assessment: Pregnant, Obese  Appetite: Good  Bowel Sounds: Active     Edema:Facial: Trace (1/26/2023  3:40 PM)  Generalized: Trace (1/25/2023  7:36 PM)  LLE: No Edema (1/26/2023 10:15 AM)  LUE: Trace (1/25/2023  7:36 PM)  RLE: No Edema (1/26/2023 10:15 AM)  RUE: Trace (1/25/2023  7:36 PM)    Nutr. Labs:  Lab Results   Component Value Date/Time    GFR est AA 58 (L) 06/14/2021 01:02 AM    GFR est non-AA 47 (L) 06/14/2021 01:02 AM    Creatinine 0.70 01/23/2023 07:35 PM    BUN 8 01/23/2023 07:35 PM    Sodium 137 01/23/2023 07:35 PM    Potassium 4.5 01/23/2023 07:35 PM    Chloride 104 01/23/2023 07:35 PM    CO2 24 01/23/2023 07:35 PM     Lab Results   Component Value Date/Time    Glucose 127 (H) 01/23/2023 07:35 PM    Glucose (POC) 132 (H) 01/26/2023 02:21 PM     Lab Results   Component Value Date/Time    Hemoglobin A1c 5.4 06/14/2021 01:02 AM     Nutr. Meds:  Folic acid, labetalol, prenatal, zoloft, aspirin  PRN: Ambien, Zofran, tums    Current Nutrition Intake & Therapies:  Average Meal Intake: 51-75%  Average Supplement Intake: None ordered  ADULT DIET Regular    Documented Meal intake:  Patient Vitals for the past 168 hrs:   % Diet Eaten   01/26/23 1344 76 - 100%   01/26/23 0938 76 - 100%     Documentation of supplement intake:  No data found. Anthropometric Measures:  Height: 5' (152.4 cm)  Ideal Body Weight (IBW): 100 lbs (45 kg)  Admission Body Weight: 223 lb  Current Body Wt:  101.2 kg (223 lb 1.7 oz), 223.1 % IBW. Stated  Current BMI (kg/m2): 43.6  Usual Body Weight: 96.2 kg (212 lb)  % Weight Change (Calculated): 5.2  Weight Adjustment:  (2nd trimester of pregnancy)                 BMI Category: Obese class 2 (BMI 35.0-39. 9)    Wt Readings from Last 10 Encounters:   01/23/23 101.2 kg (223 lb)   06/13/21 96.1 kg (211 lb 13.8 oz)   06/10/21 95.5 kg (210 lb 8.6 oz)   04/18/21 93.9 kg (207 lb)   04/18/21 93.9 kg (207 lb)   10/08/20 99.1 kg (218 lb 7.6 oz)   08/16/20 95.3 kg (210 lb)   09/14/19 99.8 kg (220 lb)   01/28/19 97.5 kg (215 lb)   01/25/19 97.5 kg (215 lb)     Estimated Daily Nutrient Needs:  Energy Requirements Based On: Formula  Weight Used for Energy Requirements: Pre-pregnancy  Energy (kcal/day): 2590 kcal/d (MSJ xAF1.3 +500 kcal)  Weight Used for Protein Requirements: Pre-pregnancy  Protein (g/day): 77g + 50g = 127g/d  Method Used for Fluid Requirements: 1 ml/kcal  Fluid (ml/day): ~2600 mL/d    Nutrition Diagnosis:   Increased nutrient needs related to increased demand for energy/nutrients as evidenced by  (pregnancy with twins.)    Nutrition Interventions:   Food and/or Nutrient Delivery: Modify current diet, Snacks (specify) - if ok with MD add HS snacks & double protein portions on meals  Nutrition Education/Counseling: Education completed regarding limited morning carbohydrates, small frequent meals, adequate hydration & monitoring overall carb intake  Coordination of Nutrition Care: Continue to monitor while inpatient  Plan of Care discussed with: patient and RN    Goals:     Goals: Meet at least 75% of estimated needs, by next RD assessment       Nutrition Monitoring and Evaluation:   Behavioral-Environmental Outcomes: Knowledge or skill  Food/Nutrient Intake Outcomes: Food and nutrient intake  Physical Signs/Symptoms Outcomes: Biochemical data, Weight, Fluid status or edema    Discharge Planning:    Continue current diet recommended by RD including NCS and increased protein    Rea Birmingham MS, RD  Contact via [facility] or office [phone]

## 2023-01-26 NOTE — PROGRESS NOTES
Problem: General Medical Care Plan  Goal: *Skin integrity maintained  Outcome: Progressing Towards Goal  Goal: *Anxiety reduced or absent  Outcome: Progressing Towards Goal

## 2023-01-27 ENCOUNTER — HOSPITAL ENCOUNTER (OUTPATIENT)
Dept: PERINATAL CARE | Age: 28
Discharge: HOME | End: 2023-01-27
Attending: OBSTETRICS & GYNECOLOGY
Payer: COMMERCIAL

## 2023-01-27 PROCEDURE — 76816 OB US FOLLOW-UP PER FETUS: CPT | Performed by: OBSTETRICS & GYNECOLOGY

## 2023-02-02 ENCOUNTER — HOSPITAL ENCOUNTER (EMERGENCY)
Age: 28
Discharge: HOME | End: 2023-02-02
Attending: OBSTETRICS & GYNECOLOGY
Payer: COMMERCIAL

## 2023-02-02 VITALS
RESPIRATION RATE: 20 BRPM | DIASTOLIC BLOOD PRESSURE: 80 MMHG | OXYGEN SATURATION: 98 % | TEMPERATURE: 99.4 F | HEART RATE: 108 BPM | SYSTOLIC BLOOD PRESSURE: 127 MMHG

## 2023-02-02 LAB
ALBUMIN SERPL-MCNC: 2.6 G/DL (ref 3.5–5)
ALBUMIN/GLOB SERPL: 0.8 (ref 1.1–2.2)
ALP SERPL-CCNC: 113 U/L (ref 45–117)
ALT SERPL-CCNC: 29 U/L (ref 12–78)
ANION GAP SERPL CALC-SCNC: 7 MMOL/L (ref 5–15)
AST SERPL-CCNC: 25 U/L (ref 15–37)
BILIRUB SERPL-MCNC: 0.4 MG/DL (ref 0.2–1)
BUN SERPL-MCNC: 7 MG/DL (ref 6–20)
BUN/CREAT SERPL: 11 (ref 12–20)
CALCIUM SERPL-MCNC: 8.5 MG/DL (ref 8.5–10.1)
CHLORIDE SERPL-SCNC: 105 MMOL/L (ref 97–108)
CO2 SERPL-SCNC: 25 MMOL/L (ref 21–32)
CREAT SERPL-MCNC: 0.63 MG/DL (ref 0.55–1.02)
ERYTHROCYTE [DISTWIDTH] IN BLOOD BY AUTOMATED COUNT: 16 % (ref 11.5–14.5)
GLOBULIN SER CALC-MCNC: 3.4 G/DL (ref 2–4)
GLUCOSE SERPL-MCNC: 116 MG/DL (ref 65–100)
HCT VFR BLD AUTO: 32.6 % (ref 35–47)
HGB BLD-MCNC: 10.2 G/DL (ref 11.5–16)
MCH RBC QN AUTO: 26.4 PG (ref 26–34)
MCHC RBC AUTO-ENTMCNC: 31.3 G/DL (ref 30–36.5)
MCV RBC AUTO: 84.5 FL (ref 80–99)
NRBC # BLD: 0.07 K/UL (ref 0–0.01)
NRBC BLD-RTO: 0.7 PER 100 WBC
PLATELET # BLD AUTO: 184 K/UL (ref 150–400)
PMV BLD AUTO: 11.4 FL (ref 8.9–12.9)
POTASSIUM SERPL-SCNC: 3.8 MMOL/L (ref 3.5–5.1)
PROT SERPL-MCNC: 6 G/DL (ref 6.4–8.2)
RBC # BLD AUTO: 3.86 M/UL (ref 3.8–5.2)
SODIUM SERPL-SCNC: 137 MMOL/L (ref 136–145)
URATE SERPL-MCNC: 5.5 MG/DL (ref 2.6–6)
WBC # BLD AUTO: 10.7 K/UL (ref 3.6–11)

## 2023-02-02 PROCEDURE — 99285 EMERGENCY DEPT VISIT HI MDM: CPT

## 2023-02-02 PROCEDURE — 85027 COMPLETE CBC AUTOMATED: CPT

## 2023-02-02 PROCEDURE — 80053 COMPREHEN METABOLIC PANEL: CPT

## 2023-02-02 PROCEDURE — 36415 COLL VENOUS BLD VENIPUNCTURE: CPT

## 2023-02-02 PROCEDURE — 74011250637 HC RX REV CODE- 250/637: Performed by: OBSTETRICS & GYNECOLOGY

## 2023-02-02 PROCEDURE — 84550 ASSAY OF BLOOD/URIC ACID: CPT

## 2023-02-02 RX ORDER — NIFEDIPINE 10 MG/1
10 CAPSULE ORAL ONCE
Status: COMPLETED | OUTPATIENT
Start: 2023-02-02 | End: 2023-02-02

## 2023-02-02 RX ORDER — NIFEDIPINE 30 MG/1
30 TABLET, EXTENDED RELEASE ORAL DAILY
Qty: 30 TABLET | Refills: 0 | Status: SHIPPED | OUTPATIENT
Start: 2023-02-03

## 2023-02-02 RX ADMIN — NIFEDIPINE 10 MG: 10 CAPSULE ORAL at 17:37

## 2023-02-02 NOTE — PROGRESS NOTES
[phone]-  27w0d patient with di/di twins arrived to [facility] with complaints of high blood pressures and L eye blurriness. Patient endorses positive fetal movement for both babies and no leakage of fluid or bleeding. Patient has had history of R eye vision issues and this is similar. Patient also history of CHTN and GDM. Patient placed on monitor, labs sent and BP cycled. 1700- Dr Rita Salgado to bedside with [address]. Procardia ordered. -okay for dc and follow up with MFM tomorrow. - patient given dc instructions and all questions answered. Patient home with family.

## 2023-02-02 NOTE — ED PROVIDER NOTES
Mirella Eid is a pleasant 33 yo  @ 27 0/7 wks with IVF di/di twins who presents to [facility] for elevated BPs at home and new left eye blank spots as well as blurred vision. Interestingly, pt reports that the same thing has happened in her right eye for which she has seen a ophthalmologist.  She was diagnosed with \"central serous retinopathy\". She states at the time of that diagnosis they also saw fluid behind her left retina but not to the same degree as the right and her vision had not been impacted in the left eye. She woke this AM to find her left eye vision as described. She has no other symptoms denying any CP, SOB, RUQ/epigastric pain, edema, HA, or new n/v. She has had active FM x 2 and denies any LOF or VB or contractions. She states that she overall feels very well and if not for the left eye changes she would not have contacted her provider. Pt normally sees Dr. Jessica Simpson of [facility] and has [facility] for which she was just increased to 400 mg labetalol TID. She last took her dose just after 1400. She has been getting BPs 150s/100s at home and that is why her dose was increased on Monday. Her pregnancy comorbidities include:  --maternal obesity with BMI 40, baby ASA  --CHTN, as above  --Hypothyroidism, on levothyroxine  --Anxiety/depression, on meds  --GDM, diet control; A1c 5.3%  --s/p BMTZ  @ 25 weeks         Chief Complaint   Patient presents with    Visual Problems       Past Medical History:   Diagnosis Date    ADHD (attention deficit hyperactivity disorder)     Dr. Kacy Solomon Ankle fracture, left     4x    Asthma attack     Depression, major 2013    Dr. Cintia Hannon hypertension    10 [facility] allergy 2010    beef (severe), tomoato (severe), others.  Dr. Melanie Staley GERD (gastroesophageal reflux disease)     Gestational diabetes     Infertility, female     IUD (intrauterine device) in place 2014    Dr. Lily Duverney    Nephrolithiasis     multiple     Panic anxiety syndrome 2013 Dr. Mely Escobedo Pap smear for cervical cancer screening     Dr Letitia Deluna Polycystic disease, ovaries        Past Surgical History:   Procedure Laterality Date    HX OTHER SURGICAL  10/2020    d and c    HX TONSIL AND ADENOIDECTOMY      LITHOTRIPSY  3 total         Family History:   Problem Relation Age of Onset    Hypertension Mother        Social History     Socioeconomic History    Marital status:      Spouse name: Not on file    Number of children: Not on file    Years of education: Not on file    Highest education level: Not on file   Occupational History    Not on file   Tobacco Use    Smoking status: Never    Smokeless tobacco: Never   Substance and Sexual Activity    Alcohol use: No    Drug use: No    Sexual activity: Yes   Other Topics Concern    Not on file   Social History Narrative    Not on file     Social Determinants of Health     Financial Resource Strain: Not on file   Food Insecurity: Not on file   Transportation Needs: Not on file   Physical Activity: Not on file   Stress: Not on file   Social Connections: Not on file   Intimate Partner Violence: Not on file   Housing Stability: Not on file         ALLERGIES: Bactrim [sulfamethoprim ds], Beef containing products, Compazine [prochlorperazine maleate], Griseofulvin, Macrobid [nitrofurantoin monohyd/m-cryst], and Tomato    Review of Systems   All other systems reviewed and are negative. Vitals:    02/02/23 1652   BP: (!) 160/101   Pulse: 94   Resp: 20   Temp: 99.4 °F (37.4 °C)   SpO2: 98%            Physical Exam  Vitals and nursing note reviewed. Exam conducted with a chaperone present. Constitutional:       Appearance: She is obese. HENT:      Head: Atraumatic. Eyes:      Extraocular Movements: Extraocular movements intact. Cardiovascular:      Rate and Rhythm: Normal rate.    Pulmonary:      Effort: Pulmonary effort is normal.   Abdominal:      Comments: Obese, soft, nontender   Musculoskeletal:         General: Normal range of motion. Cervical back: Normal range of motion. Skin:     General: Skin is warm and dry. Comments: hirsute   Neurological:      General: No focal deficit present. Mental Status: She is alert and oriented to person, place, and time. Psychiatric:         Mood and Affect: Mood normal.         Behavior: Behavior normal.        MDM     Amount and/or Complexity of Data Reviewed  Clinical lab tests: ordered  Review and summarize past medical records: yes  Independent visualization of images, tracings, or specimens: yes    Risk of Complications, Morbidity, and/or Mortality  Presenting problems: moderate  Diagnostic procedures: moderate  Management options: moderate    Critical Care  Total time providing critical care: 30-74 minutes    Patient Progress  Patient progress: stable            ED Course as of 02/03/23 1306   Thu Feb 02, 2023   1656 Pt here for BP check with h/o CHTN and visual disturbances. Will get serial BPs and labs.  [DG]   1733 Immediate release procardia x 1 now  Difficult tracing babies; will do our best with 2 nurses at bedside holding the monitors on to at least get a 15 minute strip [DG]   7867 Labs wnl  BP responded to Procardia; will discharge home with new Rx for Procardia 30 mg XL to start in AM  Pt to keep appt tomorrow with MFM [DG]      ED Course User Index  [DG] Teresa Estrada MD       Procedures    [unfilled]

## 2023-02-03 ENCOUNTER — HOSPITAL ENCOUNTER (OUTPATIENT)
Dept: PERINATAL CARE | Age: 28
Discharge: HOME | End: 2023-02-03
Attending: OBSTETRICS & GYNECOLOGY
Payer: COMMERCIAL

## 2023-02-03 ENCOUNTER — HOSPITAL ENCOUNTER (EMERGENCY)
Age: 28
Discharge: HOME | End: 2023-02-03
Attending: STUDENT IN AN ORGANIZED HEALTH CARE EDUCATION/TRAINING PROGRAM | Admitting: STUDENT IN AN ORGANIZED HEALTH CARE EDUCATION/TRAINING PROGRAM
Payer: COMMERCIAL

## 2023-02-03 VITALS
RESPIRATION RATE: 16 BRPM | OXYGEN SATURATION: 97 % | TEMPERATURE: 99 F | SYSTOLIC BLOOD PRESSURE: 155 MMHG | HEIGHT: 60 IN | WEIGHT: 228 LBS | DIASTOLIC BLOOD PRESSURE: 93 MMHG | BODY MASS INDEX: 44.76 KG/M2 | HEART RATE: 102 BPM

## 2023-02-03 PROBLEM — E66.01 MORBID OBESITY WITH BMI OF 40.0-44.9, ADULT (HCC): Status: ACTIVE | Noted: 2023-02-03

## 2023-02-03 PROBLEM — Z3A.27 27 WEEKS GESTATION OF PREGNANCY: Status: ACTIVE | Noted: 2023-02-03

## 2023-02-03 PROCEDURE — 74011250637 HC RX REV CODE- 250/637: Performed by: OBSTETRICS & GYNECOLOGY

## 2023-02-03 PROCEDURE — 76812 OB US DETAILED ADDL FETUS: CPT | Performed by: OBSTETRICS & GYNECOLOGY

## 2023-02-03 PROCEDURE — 76818 FETAL BIOPHYS PROFILE W/NST: CPT | Performed by: OBSTETRICS & GYNECOLOGY

## 2023-02-03 PROCEDURE — 76811 OB US DETAILED SNGL FETUS: CPT

## 2023-02-03 PROCEDURE — 74011250637 HC RX REV CODE- 250/637

## 2023-02-03 RX ORDER — NIFEDIPINE 10 MG/1
10 CAPSULE ORAL
Status: COMPLETED | OUTPATIENT
Start: 2023-02-03 | End: 2023-02-03

## 2023-02-03 RX ORDER — NIFEDIPINE 10 MG/1
CAPSULE ORAL
Status: COMPLETED
Start: 2023-02-03 | End: 2023-02-03

## 2023-02-03 RX ORDER — LABETALOL 200 MG/1
400 TABLET, FILM COATED ORAL EVERY 8 HOURS
Qty: 180 TABLET | Refills: 2 | Status: SHIPPED | OUTPATIENT
Start: 2023-02-04 | End: 2023-03-06

## 2023-02-03 RX ORDER — LABETALOL 200 MG/1
400 TABLET, FILM COATED ORAL EVERY 8 HOURS
Status: DISCONTINUED | OUTPATIENT
Start: 2023-02-03 | End: 2023-02-04 | Stop reason: HOSPADM

## 2023-02-03 RX ADMIN — NIFEDIPINE 10 MG: 10 CAPSULE ORAL at 19:41

## 2023-02-03 RX ADMIN — LABETALOL HYDROCHLORIDE 400 MG: 200 TABLET, FILM COATED ORAL at 19:13

## 2023-02-03 NOTE — DISCHARGE INSTRUCTIONS
Preeclampsia: Care Instructions  Preeclampsia is a condition that can happen when your blood pressure rises during pregnancy. If it's severe and not treated, it can cause seizures and damage to your liver or kidneys. It can also prevent your baby from getting enough nutrients and oxygen. This can cause low birth weight and other problems. Most people with preeclampsia have healthy babies. Preeclampsia usually goes away in the weeks after birth. In rare cases, symptoms of preeclampsia don't show up until days or weeks after childbirth. Follow your doctor's directions about how active you can be. Your doctor will watch you closely to prevent problems. And if your health or your baby's health is at risk, they'll deliver your baby early. Keep track of your blood pressure at home if your doctor asks you to. Ask your doctor to make sure that the monitor is working and that you're using it right. Follow instructions about when to take your blood pressure and what to avoid before taking your blood pressure. Take medicines exactly as prescribed. You may need to take medicine to control your blood pressure. Don't smoke. If you need help quitting, talk to your doctor. Check your baby's movements. Once each day, time how long it takes to count 10 movements. If you don't feel at least 10 movements in 2 hours, call your doctor. When should you call for help? Share this information with your partner or a friend. They can help you watch for warning signs. Call 911  anytime you think you may need emergency care. For example, call if:    You passed out (lost consciousness). You have a seizure. You have trouble breathing. You have chest pain. Call your doctor now or seek immediate medical care if:    You have:  Sudden swelling of your face, hands, or feet. New vision problems (such as dimness, blurring, or seeing spots). A severe headache.      Your blood pressure is very high, such as 160/110 or higher. Or your blood pressure is higher than your doctor told you it should be, or it rises quickly. You have new nausea or vomiting. You think that you are in labor. You have pain in your belly or pelvis. You gain weight rapidly. Follow-up care is a key part of your treatment and safety. Be sure to make and go to all appointments, and call your doctor if you are having problems. It's also a good idea to know your test results and keep a list of the medicines you take. Where can you learn more? Go to http://www.gray.com/  Enter Z954 in the search box to learn more about \"Preeclampsia: Care Instructions. \"  Current as of: 2022               Content Version: 13.4   [employer]. Care instructions adapted under license by [employer] (which disclaims liability or warranty for this information). If you have questions about a medical condition or this instruction, always ask your healthcare professional. [place] [address] any warranty or liability for your use of this information. Weeks 26 to 30 of Your Pregnancy: Care Instructions  You're starting your last trimester. Harlen Severin probably feel your baby moving around more. Your back may ache as your body gets used to your baby's size and length. Take care of yourself, and pay attention to what your body needs. Talk to your doctor about getting the Tdap shot. It will help protect your  against whooping cough (pertussis). You may have [place] contractions. They are single or several strong contractions without a pattern. These are practice contractions but not the start of labor. Be kind to yourself. Take breaks when you're tired. Change positions often. Don't sit for too long or stand for too long. At work, rest during breaks if you can.  If you don't get breaks, talk to your doctor about writing a letter to your employer to request them. Avoid fumes, chemicals, and tobacco smoke. Be sexual if you want to. You may be interested in sex, or you may not. Everyone is different. Sex is okay unless your doctor tells you not to. Your belly can make it hard to find good positions for sex. [place] and explore. Watch for signs of  labor. These signs include:  Menstrual-like cramps. Or you may have pain or pressure in your pelvis that happens in a pattern. About 6 or more contractions in an hour (even after rest and a glass of water). A low, dull backache that doesn't go away when you change positions. An increase or change in vaginal discharge. Your water breaking. Know what to do if you think you are having contractions. Drink 1 or 2 glasses of water. Lie down on your left side for at least an hour. While on your side, feel the top of your belly to see if it's tight. Write down your contractions for an hour. Time how long it is from the start of one contraction to the start of the next. Call your doctor if you have regular contractions. Follow-up care is a key part of your treatment and safety. Be sure to make and go to all appointments, and call your doctor if you are having problems. It's also a good idea to know your test results and keep a list of the medicines you take. Where can you learn more? Go to http://www.gray.com/  Enter E035 in the search box to learn more about \"Weeks 26 to 30 of Your Pregnancy: Care Instructions. \"  Current as of: 2022               Content Version: 13.4   [employer]. Care instructions adapted under license by [employer] (which disclaims liability or warranty for this information). If you have questions about a medical condition or this instruction, always ask your healthcare professional. [address] any warranty or liability for your use of this information.

## 2023-02-03 NOTE — H&P
History & Physical    Name: Zay Hardy MRN: [id number]  SSN: [id number]    YOB: 1995  Age: 32 y.o. Sex: female      Subjective:     Reason for Admission:  Twin Pregnancy and possible IUGR in twin A with non reassuring fetal surveillance. History of Present Illness: Zay Hardy is a 32 y.o.  female with an estimated gestational age of 27w3d with Estimated Date of Delivery: 23. Patient Pregnancy has been complicated by chronic hypertension, diabetes - gestational, fetal growth restriction, and twins. Patient denies abdominal pain  , contractions, fever, headache , nausea and vomiting, pelvic pressure, vaginal bleeding , and vaginal leaking of fluid . Pt was at [facility] yesterday for similar issue and had prolonged monitoring and was discharged home. She went to her regular  visit today and BPPs were 6/8 and 6/8 with twin A showing possible IUGR. Was sent here for monitoring. Patient is upset and frustrated by the entire situation. Long discussion with her and her mother regarding her high risk status (Twins, IVF Pregnancy, CHTN, GDM, Obesity, IUGR). Pt agrees to short stay for eval of fetal heart rate. OB History          4    Para   0    Term                AB   3    Living             SAB   3    IAB        Ectopic        Molar        Multiple        Live Births                  Past Medical History:   Diagnosis Date    ADHD (attention deficit hyperactivity disorder)     Dr. Taras Acosta    Ankle fracture, left     4x    Asthma attack     Depression, major 2013    Dr. Bea Garber hypertension     Food allergy     beef (severe), tomoato (severe), others.  Dr. Claire Arrieta    GERD (gastroesophageal reflux disease)     Gestational diabetes     Infertility, female     IUD (intrauterine device) in place 2014    Dr. Johnette Cushing    Nephrolithiasis     multiple     Panic anxiety syndrome 2013    Dr. Taras Acosta    Pap smear for cervical cancer screening     Dr Mauri Thomas    Polycystic disease, ovaries      Past Surgical History:   Procedure Laterality Date    HX OTHER SURGICAL  10/2020    d and c    HX TONSIL AND ADENOIDECTOMY      LITHOTRIPSY  3 total     Social History     Occupational History    Not on file   Tobacco Use    Smoking status: Never    Smokeless tobacco: Never   Substance and Sexual Activity    Alcohol use: No    Drug use: No    Sexual activity: Yes     Family History   Problem Relation Age of Onset    Hypertension Mother        Allergies   Allergen Reactions    Bactrim [Sulfamethoprim Ds] Rash    Beef Containing Products Itching     Itchy throat or GI issues     Compazine [Prochlorperazine Maleate] Anxiety    Griseofulvin Rash    Macrobid [Nitrofurantoin Monohyd/M-Cryst] Nausea and Vomiting    Tomato Itching     Itchy throat or GI issues      Prior to Admission medications    Medication Sig Start Date End Date Taking? Authorizing Provider   NIFEdipine ER (Procardia XL) 30 mg ER tablet Take 1 Tablet by mouth daily. Indications: pre-existing hypertension during pregnancy 2/3/23   Donovan Ganser, MD   labetaloL (NORMODYNE) 200 mg tablet Take 2 Tablets by mouth every twelve (12) hours. Patient taking differently: Take 400 mg by mouth every twelve (12) hours. Indications: high blood pressure 1/26/23   Bre Wise,    levothyroxine (SYNTHROID) 50 mcg tablet Take 50 mcg by mouth Daily (before breakfast). Provider, Historical   PNV Comb #2-Iron-FA-Omega 3 29-1-400 mg cmpk Take  by mouth. Provider, Historical   aspirin 81 mg cap Take 81 mg by mouth daily. Provider, Historical   glucose blood VI test strips (OneTouch Ultra Test) strip Check BS 4 times daily, fasting in the AM and 1 hour after meals. 12/21/22   Danna Del Rio MD   lancets (OneTouch UltraSoft Lancets) misc Check BS 4 times daily, fasting in the AM and 1 hour after meals.  12/21/22   Danna Del Rio MD   Blood-Glucose Meter (OneTouch Ultra2 Meter) monitoring kit Check BS 4 times daily, fasting in the AM and 1 hour after meals. 12/21/22   Sharmaine Cuevas MD   alcohol swabs padm Check blood sugar 4 times daily 12/19/22   Sharmaine Cuevas MD   sertraline (ZOLOFT) 50 mg tablet Take 75 mg by mouth nightly. Provider, Historical   omeprazole (PRILOSEC) 20 mg capsule Take 20 mg by mouth daily. Provider, Historical   albuterol (PROVENTIL HFA, VENTOLIN HFA, PROAIR HFA) 90 mcg/actuation inhaler Take 2 Puffs by inhalation every four (4) hours as needed for Wheezing. 1/10/18   Pattie Ward MD        Review of Systems    Objective:     Vitals:    Vitals:    02/03/23 1842   Weight: 228 lb (103.4 kg)   Height: 5' (1.524 m)      No data recorded. BP  Min: 127/80  Max: 129/78     Physical Exam    HEENT WNL  Heart RRR  Chest CTA  Abd Gravid, soft NT  Ext Neg Edema    Labs: No results found for this or any previous visit (from the past 24 hour(s)). Assessment and Plan:     Hospital Problems  Date Reviewed: 1/23/2023            Codes Class Noted POA    27 weeks gestation of pregnancy ICD-10-CM: Z3A.27  ICD-9-CM: V22.2  2/3/2023 Yes        IVF Pregnancy ICD-10-CM: Z38.2  ICD-9-CM: 190.00  2/3/2023 Yes        Diet controlled gestational diabetes mellitus (GDM) in second trimester ICD-10-CM: O24.410  ICD-9-CM: 648.83  1/24/2023 Yes        * (Principal) Fetal growth restriction antepartum ICD-10-CM: O36.5990  ICD-9-CM: 656.50  1/24/2023 Yes        Dichorionic diamniotic twin pregnancy in second trimester ICD-10-CM: O30.042  ICD-9-CM: 651.03, V91.03  1/23/2023 Yes        Chronic hypertension complicating or reason for care during pregnancy, second trimester ICD-10-CM: O10.912  ICD-9-CM: 642.03, 401.9  1/23/2023 Yes         Will do extended monitoring. If fetal tracing reassuring can be discharged.       Signed By:  Whit De Leon MD     February 3, 2023

## 2023-02-03 NOTE — PROGRESS NOTES
1856: Dr. Aime Yancey phoned due to pt. Severe range blood pressures. Pt. Has GHTN and takes labetalol 400 TID and procardia 30 once in AM. MD stated will order blood pressure medication.

## 2023-02-04 NOTE — DISCHARGE INSTRUCTIONS
High Blood Pressure in Pregnancy: Care Instructions  Overview     High blood pressure (hypertension) means that the force of blood against your artery walls is too strong. High blood pressure problems during pregnancy include:  Chronic hypertension. This is high blood pressure that starts before pregnancy. Gestational hypertension. This is high blood pressure that starts in the second or third trimester of pregnancy. Preeclampsia. This is a problem that includes high blood pressure and signs of organ injury during pregnancy. In some cases, it leads to eclampsia. Eclampsia causes seizures. High blood pressure during pregnancy can affect the amount of oxygen and nutrients your baby receives. This can affect how your baby grows. High blood pressure can also cause other serious problems for both you and your baby, such as placental abruption. To prevent problems, you and your baby will be watched very closely. You will have to check your blood pressure often during pregnancy and possibly after pregnancy. If your blood pressure rises suddenly or is very high during your pregnancy, your doctor may prescribe medicines. They can usually control blood pressure. If your blood pressure affects your or your baby's health, your doctor may need to deliver your baby early. After your baby is born, your blood pressure will probably improve. But sometimes blood pressure problems continue after birth. Follow-up care is a key part of your treatment and safety. Be sure to make and go to all appointments, and call your doctor if you are having problems. It's also a good idea to know your test results and keep a list of the medicines you take. How can you care for yourself at home? Take and write down your blood pressure at home if your doctor says to. Take your medicines exactly as prescribed. Call your doctor if you think you are having a problem with your medicine. Do not smoke.  This is one of the best things you can do to help your baby be healthy. If you need help quitting, talk to your doctor about stop-smoking programs and medicines. These can increase your chances of quitting for good. Don't gain too much weight during pregnancy. Talk to your doctor about how much weight gain is healthy. Eat a healthy diet. If your doctor says it's okay, get regular exercise. Walking or swimming several times a week can be healthy for you and your baby. Reduce stress, and find time to relax. This is very important if you continue to work or have a busy schedule. It's also important if you have small children at home. When should you call for help? Share this information with your partner or a friend. They can help you watch for warning signs. Call 911  anytime you think you may need emergency care. For example, call if:    You passed out (lost consciousness). You have a seizure. Call your doctor now or seek immediate medical care if:    You have symptoms of preeclampsia, such as:  Sudden swelling of your face, hands, or feet. New vision problems (such as dimness, blurring, or seeing spots). A severe headache. Your blood pressure is very high, such as 160/110 or higher. Your blood pressure is higher than your doctor told you it should be, or it rises quickly. You have new nausea or vomiting. You think that you are in labor. You have pain in your belly or pelvis. Watch closely for changes in your health, and be sure to contact your doctor if:    You gain weight rapidly. Where can you learn more? Go to http://www.gray.com/  Enter A052 in the search box to learn more about \"High Blood Pressure in Pregnancy: Care Instructions. \"  Current as of: February 23, 2022               Content Version: 13.4  © 3919-8058 [employer]. Care instructions adapted under license by [employer] (which disclaims liability or warranty for this information).  If you have questions about a medical condition or this instruction, always ask your healthcare professional. [place] [address] any warranty or liability for your use of this information. Weeks 26 to 30 of Your Pregnancy: Care Instructions  You're starting your last trimester. Citlali Nixon probably feel your baby moving around more. Your back may ache as your body gets used to your baby's size and length. Take care of yourself, and pay attention to what your body needs. Talk to your doctor about getting the Tdap shot. It will help protect your  against whooping cough (pertussis). You may have [place] contractions. They are single or several strong contractions without a pattern. These are practice contractions but not the start of labor. Be kind to yourself. Take breaks when you're tired. Change positions often. Don't sit for too long or stand for too long. At work, rest during breaks if you can. If you don't get breaks, talk to your doctor about writing a letter to your employer to request them. Avoid fumes, chemicals, and tobacco smoke. Be sexual if you want to. You may be interested in sex, or you may not. Everyone is different. Sex is okay unless your doctor tells you not to. Your belly can make it hard to find good positions for sex. [place] and explore. Watch for signs of  labor. These signs include:  Menstrual-like cramps. Or you may have pain or pressure in your pelvis that happens in a pattern. About 6 or more contractions in an hour (even after rest and a glass of water). A low, dull backache that doesn't go away when you change positions. An increase or change in vaginal discharge. Your water breaking. Know what to do if you think you are having contractions. Drink 1 or 2 glasses of water. Lie down on your left side for at least an hour. While on your side, feel the top of your belly to see if it's tight.   Write down your contractions for an hour. Time how long it is from the start of one contraction to the start of the next. Call your doctor if you have regular contractions. Follow-up care is a key part of your treatment and safety. Be sure to make and go to all appointments, and call your doctor if you are having problems. It's also a good idea to know your test results and keep a list of the medicines you take. Where can you learn more? Go to http://www.gray.com/  Enter T960 in the search box to learn more about \"Weeks 26 to 30 of Your Pregnancy: Care Instructions. \"  Current as of: February 23, 2022               Content Version: 13.4  © 5106-3094 [employer], [employer]. Care instructions adapted under license by [employer] (which disclaims liability or warranty for this information). If you have questions about a medical condition or this instruction, always ask your healthcare professional. [address] any warranty or liability for your use of this information.

## 2023-02-04 NOTE — PROGRESS NOTES
Pt wants to go home    Bps were elevated after patient missed dose of labetalol  Bps improved after dose + nifedipine IR 10. Fetal tracing reactive for 27 weeks x2. Ok to DC home with short term followup.

## 2023-02-04 NOTE — DISCHARGE SUMMARY
Obstetrical Discharge Summary     Name: Nona Osborne MRN: [id number]  SSN: [id number]    YOB: 1995  Age: 32 y.o. Sex: female      Admit Date: 2/3/2023    Discharge Date: 2/3/2023     Admitting Physician: Maribel Sandra DO     Attending Physician:  Miesha Villalobos DO     Admission Diagnoses: 32 week twins  IUGR  CHTN,     Discharge Diagnoses:   same    Additional Diagnoses:   Hospital Problems  Date Reviewed: 1/23/2023            Codes Class Noted POA    27 weeks gestation of pregnancy ICD-10-CM: Z3A.27  ICD-9-CM: V22.2  2/3/2023 Yes        IVF Pregnancy ICD-10-CM: Z38.2  ICD-9-CM: 760.63  2/3/2023 Yes        Morbid obesity with BMI of 40.0-44.9, adult [facility]) ICD-10-CM: E66.01, Z68.41  ICD-9-CM: 278.01, V85.41  2/3/2023 Yes        Diet controlled gestational diabetes mellitus (GDM) in second trimester ICD-10-CM: O24.410  ICD-9-CM: 648.83  1/24/2023 Yes        * (Principal) Fetal growth restriction antepartum ICD-10-CM: O36.5990  ICD-9-CM: 656.50  1/24/2023 Yes        Dichorionic diamniotic twin pregnancy in second trimester ICD-10-CM: O30.042  ICD-9-CM: 651.03, V91.03  1/23/2023 Yes        Chronic hypertension complicating or reason for care during pregnancy, second trimester ICD-10-CM: O10.912  ICD-9-CM: 642.03, 401.9  1/23/2023 Yes        No results found for: RUBELLAEXT, GRBSEXT    Immunization(s):   Immunization History   Administered Date(s) Administered    Influenza Vaccine 11/17/2015        Hospital Course: observed for several hours of monitoring with reassuring tracing x2 for 27 weeks. BP was elevated initially but responded to giving missed dose of labetalol. Will increase labetalol to 400 TID. The patient was released to her home in good condition. Patient Instructions:     Reference my discharge instructions. I spent 10 minutes discharging the patient in face to face contact.       Follow-up Appointments   Procedures    FOLLOW UP VISIT Appointment in: 3 - 11 Days  Irvin Russell     Standing Status:   Standing     Number of Occurrences:   1     Order Specific Question:   Appointment in     Answer:   3 - 5 Days        Signed By:  Skyla Joel MD     February 3, 2023

## 2023-02-04 NOTE — PROGRESS NOTES
Kenroy Renteria made aware of /103, pt received her  400mg labetalol dose, Dr Modesto Renteria orders 10mg nifedipine po now. 2115 Call to Dr Modesto Renteria, luly reviewed, order received to discharge patient home. 2130 Discharge instructions reviewed with pt. Pt verbalized understanding of all teaching and stated when she was to return to the dr. Bharati Kyle also verbalized ss to look for and should call the  For.     2135 Pt discharged home ambulatory with mother.

## 2023-02-09 ENCOUNTER — PATIENT MESSAGE (OUTPATIENT)
Dept: PERINATAL CARE | Age: 28
End: 2023-02-09

## 2023-02-10 ENCOUNTER — TELEPHONE (OUTPATIENT)
Dept: PERINATAL CARE | Age: 28
End: 2023-02-10

## 2023-02-10 NOTE — TELEPHONE ENCOUNTER
Placed call to patient in regard to cancelled appointment on 2/9/23 and [facility] message unread requesting BG log readings. Pt informed me she tested + for COVID but made a follow up appt for next week. I requested she send her BG log through [facility] for the MD to review. Pt understanding and had no further questions at this time.

## 2023-02-14 ENCOUNTER — HOSPITAL ENCOUNTER (OUTPATIENT)
Dept: GENERAL RADIOLOGY | Age: 28
Discharge: HOME | End: 2023-02-14
Attending: OBSTETRICS & GYNECOLOGY
Payer: COMMERCIAL

## 2023-02-14 ENCOUNTER — ANESTHESIA EVENT (OUTPATIENT)
Dept: LABOR AND DELIVERY | Age: 28
End: 2023-02-14
Payer: COMMERCIAL

## 2023-02-14 ENCOUNTER — ANESTHESIA (OUTPATIENT)
Dept: LABOR AND DELIVERY | Age: 28
End: 2023-02-14
Payer: COMMERCIAL

## 2023-02-14 ENCOUNTER — HOSPITAL ENCOUNTER (INPATIENT)
Age: 28
LOS: 2 days | Discharge: HOME | End: 2023-02-16
Attending: STUDENT IN AN ORGANIZED HEALTH CARE EDUCATION/TRAINING PROGRAM | Admitting: OBSTETRICS & GYNECOLOGY
Payer: COMMERCIAL

## 2023-02-14 ENCOUNTER — HOSPITAL ENCOUNTER (OUTPATIENT)
Dept: PERINATAL CARE | Age: 28
Discharge: HOME | End: 2023-02-14
Attending: OBSTETRICS & GYNECOLOGY
Payer: COMMERCIAL

## 2023-02-14 DIAGNOSIS — O10.912 CHRONIC HYPERTENSION COMPLICATING OR REASON FOR CARE DURING PREGNANCY, SECOND TRIMESTER: Primary | ICD-10-CM

## 2023-02-14 LAB
ALBUMIN SERPL-MCNC: 2 G/DL (ref 3.5–5)
ALBUMIN SERPL-MCNC: 2.3 G/DL (ref 3.5–5)
ALBUMIN/GLOB SERPL: 0.5 (ref 1.1–2.2)
ALBUMIN/GLOB SERPL: 0.6 (ref 1.1–2.2)
ALP SERPL-CCNC: 185 U/L (ref 45–117)
ALP SERPL-CCNC: 215 U/L (ref 45–117)
ALT SERPL-CCNC: 373 U/L (ref 12–78)
ALT SERPL-CCNC: 417 U/L (ref 12–78)
ANION GAP SERPL CALC-SCNC: 5 MMOL/L (ref 5–15)
ANION GAP SERPL CALC-SCNC: 5 MMOL/L (ref 5–15)
APTT PPP: 29.9 SEC (ref 22.1–31)
AST SERPL-CCNC: 448 U/L (ref 15–37)
AST SERPL-CCNC: 508 U/L (ref 15–37)
BASOPHILS # BLD: 0 K/UL (ref 0–0.1)
BASOPHILS # BLD: 0 K/UL (ref 0–0.1)
BASOPHILS NFR BLD: 0 % (ref 0–1)
BASOPHILS NFR BLD: 0 % (ref 0–1)
BILIRUB SERPL-MCNC: 3.9 MG/DL (ref 0.2–1)
BILIRUB SERPL-MCNC: 4 MG/DL (ref 0.2–1)
BUN SERPL-MCNC: 25 MG/DL (ref 6–20)
BUN SERPL-MCNC: 25 MG/DL (ref 6–20)
BUN/CREAT SERPL: 16 (ref 12–20)
BUN/CREAT SERPL: 19 (ref 12–20)
CALCIUM SERPL-MCNC: 7.8 MG/DL (ref 8.5–10.1)
CALCIUM SERPL-MCNC: 8.4 MG/DL (ref 8.5–10.1)
CHLORIDE SERPL-SCNC: 101 MMOL/L (ref 97–108)
CHLORIDE SERPL-SCNC: 103 MMOL/L (ref 97–108)
CO2 SERPL-SCNC: 26 MMOL/L (ref 21–32)
CO2 SERPL-SCNC: 26 MMOL/L (ref 21–32)
COMMENT, HOLDF: NORMAL
COMMENT, HOLDF: NORMAL
CREAT SERPL-MCNC: 1.29 MG/DL (ref 0.55–1.02)
CREAT SERPL-MCNC: 1.6 MG/DL (ref 0.55–1.02)
CREAT UR-MCNC: 305 MG/DL
DIFFERENTIAL METHOD BLD: ABNORMAL
DIFFERENTIAL METHOD BLD: ABNORMAL
EOSINOPHIL # BLD: 0 K/UL (ref 0–0.4)
EOSINOPHIL # BLD: 0.1 K/UL (ref 0–0.4)
EOSINOPHIL NFR BLD: 0 % (ref 0–7)
EOSINOPHIL NFR BLD: 1 % (ref 0–7)
ERYTHROCYTE [DISTWIDTH] IN BLOOD BY AUTOMATED COUNT: 23.5 % (ref 11.5–14.5)
ERYTHROCYTE [DISTWIDTH] IN BLOOD BY AUTOMATED COUNT: 23.6 % (ref 11.5–14.5)
ERYTHROCYTE [DISTWIDTH] IN BLOOD BY AUTOMATED COUNT: 23.8 % (ref 11.5–14.5)
ERYTHROCYTE [DISTWIDTH] IN BLOOD BY AUTOMATED COUNT: 23.9 % (ref 11.5–14.5)
FIBRINOGEN PPP-MCNC: 607 MG/DL (ref 200–475)
GLOBULIN SER CALC-MCNC: 3.4 G/DL (ref 2–4)
GLOBULIN SER CALC-MCNC: 4.8 G/DL (ref 2–4)
GLUCOSE SERPL-MCNC: 71 MG/DL (ref 65–100)
GLUCOSE SERPL-MCNC: 82 MG/DL (ref 65–100)
HCT VFR BLD AUTO: 23.4 % (ref 35–47)
HCT VFR BLD AUTO: 27.4 % (ref 35–47)
HCT VFR BLD AUTO: 28.1 % (ref 35–47)
HCT VFR BLD AUTO: 33.8 % (ref 35–47)
HGB BLD-MCNC: 10.9 G/DL (ref 11.5–16)
HGB BLD-MCNC: 7.7 G/DL (ref 11.5–16)
HGB BLD-MCNC: 8.9 G/DL (ref 11.5–16)
HGB BLD-MCNC: 9.2 G/DL (ref 11.5–16)
HISTORY CHECKED?,CKHIST: NORMAL
IMM GRANULOCYTES # BLD AUTO: 0 K/UL
IMM GRANULOCYTES # BLD AUTO: 0 K/UL (ref 0–0.04)
IMM GRANULOCYTES NFR BLD AUTO: 0 %
IMM GRANULOCYTES NFR BLD AUTO: 0 % (ref 0–0.5)
INR PPP: 1.1 (ref 0.9–1.1)
LYMPHOCYTES # BLD: 1.3 K/UL (ref 0.8–3.5)
LYMPHOCYTES # BLD: 1.6 K/UL (ref 0.8–3.5)
LYMPHOCYTES NFR BLD: 19 % (ref 12–49)
LYMPHOCYTES NFR BLD: 24 % (ref 12–49)
MAGNESIUM SERPL-MCNC: 1.6 MG/DL (ref 1.6–2.4)
MCH RBC QN AUTO: 27.2 PG (ref 26–34)
MCH RBC QN AUTO: 27.3 PG (ref 26–34)
MCH RBC QN AUTO: 27.5 PG (ref 26–34)
MCH RBC QN AUTO: 27.8 PG (ref 26–34)
MCHC RBC AUTO-ENTMCNC: 32.2 G/DL (ref 30–36.5)
MCHC RBC AUTO-ENTMCNC: 32.5 G/DL (ref 30–36.5)
MCHC RBC AUTO-ENTMCNC: 32.7 G/DL (ref 30–36.5)
MCHC RBC AUTO-ENTMCNC: 32.9 G/DL (ref 30–36.5)
MCV RBC AUTO: 83.8 FL (ref 80–99)
MCV RBC AUTO: 83.9 FL (ref 80–99)
MCV RBC AUTO: 84.5 FL (ref 80–99)
MCV RBC AUTO: 84.7 FL (ref 80–99)
MONOCYTES # BLD: 0.4 K/UL (ref 0–1)
MONOCYTES # BLD: 0.5 K/UL (ref 0–1)
MONOCYTES NFR BLD: 6 % (ref 5–13)
MONOCYTES NFR BLD: 8 % (ref 5–13)
NEUTS BAND NFR BLD MANUAL: 5 %
NEUTS BAND NFR BLD MANUAL: 5 % (ref 0–6)
NEUTS SEG # BLD: 4.3 K/UL (ref 1.8–8)
NEUTS SEG # BLD: 4.9 K/UL (ref 1.8–8)
NEUTS SEG NFR BLD: 62 % (ref 32–75)
NEUTS SEG NFR BLD: 70 % (ref 32–75)
NRBC # BLD: 0.89 K/UL (ref 0–0.01)
NRBC # BLD: 1.04 K/UL (ref 0–0.01)
NRBC # BLD: 1.47 K/UL (ref 0–0.01)
NRBC # BLD: 1.47 K/UL (ref 0–0.01)
NRBC BLD-RTO: 12.9 PER 100 WBC
NRBC BLD-RTO: 13.5 PER 100 WBC
NRBC BLD-RTO: 13.9 PER 100 WBC
NRBC BLD-RTO: 22.7 PER 100 WBC
PLATELET # BLD AUTO: 18 K/UL (ref 150–400)
PLATELET # BLD AUTO: 21 K/UL (ref 150–400)
PLATELET # BLD AUTO: 26 K/UL (ref 150–400)
PLATELET # BLD AUTO: 41 K/UL (ref 150–400)
PLATELET COMMENTS,PCOM: ABNORMAL
PLATELET COMMENTS,PCOM: ABNORMAL
PMV BLD AUTO: ABNORMAL FL (ref 8.9–12.9)
POTASSIUM SERPL-SCNC: 4.9 MMOL/L (ref 3.5–5.1)
POTASSIUM SERPL-SCNC: 6.4 MMOL/L (ref 3.5–5.1)
PROT SERPL-MCNC: 5.4 G/DL (ref 6.4–8.2)
PROT SERPL-MCNC: 7.1 G/DL (ref 6.4–8.2)
PROT UR-MCNC: 2402 MG/DL (ref 0–11.9)
PROT/CREAT UR-RTO: 7.9
PROTHROMBIN TIME: 11.1 SEC (ref 9–11.1)
RBC # BLD AUTO: 2.77 M/UL (ref 3.8–5.2)
RBC # BLD AUTO: 3.27 M/UL (ref 3.8–5.2)
RBC # BLD AUTO: 3.35 M/UL (ref 3.8–5.2)
RBC # BLD AUTO: 3.99 M/UL (ref 3.8–5.2)
RBC MORPH BLD: ABNORMAL
RBC MORPH BLD: ABNORMAL
SAMPLES BEING HELD,HOLD: NORMAL
SAMPLES BEING HELD,HOLD: NORMAL
SODIUM SERPL-SCNC: 132 MMOL/L (ref 136–145)
SODIUM SERPL-SCNC: 134 MMOL/L (ref 136–145)
THERAPEUTIC RANGE,PTTT: NORMAL SECS (ref 58–77)
WBC # BLD AUTO: 10.5 K/UL (ref 3.6–11)
WBC # BLD AUTO: 6.5 K/UL (ref 3.6–11)
WBC # BLD AUTO: 6.6 K/UL (ref 3.6–11)
WBC # BLD AUTO: 8.1 K/UL (ref 3.6–11)

## 2023-02-14 PROCEDURE — 77010026064 HC OXYGEN INFANT MED AIR MIN: Performed by: STUDENT IN AN ORGANIZED HEALTH CARE EDUCATION/TRAINING PROGRAM

## 2023-02-14 PROCEDURE — 76010000392 HC C SECN EA ADDL 0.5 HR: Performed by: STUDENT IN AN ORGANIZED HEALTH CARE EDUCATION/TRAINING PROGRAM

## 2023-02-14 PROCEDURE — 74011250637 HC RX REV CODE- 250/637: Performed by: OBSTETRICS & GYNECOLOGY

## 2023-02-14 PROCEDURE — 83880 ASSAY OF NATRIURETIC PEPTIDE: CPT

## 2023-02-14 PROCEDURE — 36430 TRANSFUSION BLD/BLD COMPNT: CPT

## 2023-02-14 PROCEDURE — 74011250637 HC RX REV CODE- 250/637: Performed by: STUDENT IN AN ORGANIZED HEALTH CARE EDUCATION/TRAINING PROGRAM

## 2023-02-14 PROCEDURE — 74011000250 HC RX REV CODE- 250: Performed by: OBSTETRICS & GYNECOLOGY

## 2023-02-14 PROCEDURE — 83735 ASSAY OF MAGNESIUM: CPT

## 2023-02-14 PROCEDURE — 75410000003 HC RECOV DEL/VAG/CSECN EA 0.5 HR: Performed by: STUDENT IN AN ORGANIZED HEALTH CARE EDUCATION/TRAINING PROGRAM

## 2023-02-14 PROCEDURE — 74011000258 HC RX REV CODE- 258: Performed by: STUDENT IN AN ORGANIZED HEALTH CARE EDUCATION/TRAINING PROGRAM

## 2023-02-14 PROCEDURE — 88307 TISSUE EXAM BY PATHOLOGIST: CPT

## 2023-02-14 PROCEDURE — 76816 OB US FOLLOW-UP PER FETUS: CPT | Performed by: OBSTETRICS & GYNECOLOGY

## 2023-02-14 PROCEDURE — 65410000002 HC RM PRIVATE OB

## 2023-02-14 PROCEDURE — 77010026065 HC OXYGEN MINIMUM MEDICAL AIR: Performed by: STUDENT IN AN ORGANIZED HEALTH CARE EDUCATION/TRAINING PROGRAM

## 2023-02-14 PROCEDURE — 74011250636 HC RX REV CODE- 250/636: Performed by: OBSTETRICS & GYNECOLOGY

## 2023-02-14 PROCEDURE — P9073 PLATELETS PHERESIS PATH REDU: HCPCS

## 2023-02-14 PROCEDURE — 85610 PROTHROMBIN TIME: CPT

## 2023-02-14 PROCEDURE — 85027 COMPLETE CBC AUTOMATED: CPT

## 2023-02-14 PROCEDURE — 76820 UMBILICAL ARTERY ECHO: CPT | Performed by: OBSTETRICS & GYNECOLOGY

## 2023-02-14 PROCEDURE — 71045 X-RAY EXAM CHEST 1 VIEW: CPT

## 2023-02-14 PROCEDURE — 85730 THROMBOPLASTIN TIME PARTIAL: CPT

## 2023-02-14 PROCEDURE — 76060000033 HC ANESTHESIA 1 TO 1.5 HR: Performed by: STUDENT IN AN ORGANIZED HEALTH CARE EDUCATION/TRAINING PROGRAM

## 2023-02-14 PROCEDURE — P9016 RBC LEUKOCYTES REDUCED: HCPCS

## 2023-02-14 PROCEDURE — 76010000391 HC C SECN FIRST 1 HR: Performed by: STUDENT IN AN ORGANIZED HEALTH CARE EDUCATION/TRAINING PROGRAM

## 2023-02-14 PROCEDURE — 76819 FETAL BIOPHYS PROFIL W/O NST: CPT | Performed by: OBSTETRICS & GYNECOLOGY

## 2023-02-14 PROCEDURE — 74011250636 HC RX REV CODE- 250/636: Performed by: STUDENT IN AN ORGANIZED HEALTH CARE EDUCATION/TRAINING PROGRAM

## 2023-02-14 PROCEDURE — 36415 COLL VENOUS BLD VENIPUNCTURE: CPT

## 2023-02-14 PROCEDURE — 84156 ASSAY OF PROTEIN URINE: CPT

## 2023-02-14 PROCEDURE — 85384 FIBRINOGEN ACTIVITY: CPT

## 2023-02-14 PROCEDURE — 85025 COMPLETE CBC W/AUTO DIFF WBC: CPT

## 2023-02-14 PROCEDURE — 80053 COMPREHEN METABOLIC PANEL: CPT

## 2023-02-14 PROCEDURE — 86923 COMPATIBILITY TEST ELECTRIC: CPT

## 2023-02-14 PROCEDURE — 86900 BLOOD TYPING SEROLOGIC ABO: CPT

## 2023-02-14 PROCEDURE — 65270000029 HC RM PRIVATE

## 2023-02-14 RX ORDER — SODIUM CHLORIDE, SODIUM LACTATE, POTASSIUM CHLORIDE, CALCIUM CHLORIDE 600; 310; 30; 20 MG/100ML; MG/100ML; MG/100ML; MG/100ML
125 INJECTION, SOLUTION INTRAVENOUS CONTINUOUS
Status: DISCONTINUED | OUTPATIENT
Start: 2023-02-14 | End: 2023-02-14

## 2023-02-14 RX ORDER — SIMETHICONE 80 MG
80 TABLET,CHEWABLE ORAL AS NEEDED
Status: DISCONTINUED | OUTPATIENT
Start: 2023-02-14 | End: 2023-02-17 | Stop reason: HOSPADM

## 2023-02-14 RX ORDER — MISOPROSTOL 200 UG/1
600 TABLET ORAL ONCE
Status: COMPLETED | OUTPATIENT
Start: 2023-02-14 | End: 2023-02-14

## 2023-02-14 RX ORDER — NIFEDIPINE 30 MG/1
30 TABLET, EXTENDED RELEASE ORAL EVERY 12 HOURS
Status: DISCONTINUED | OUTPATIENT
Start: 2023-02-14 | End: 2023-02-17 | Stop reason: HOSPADM

## 2023-02-14 RX ORDER — BUPIVACAINE HYDROCHLORIDE 7.5 MG/ML
INJECTION, SOLUTION EPIDURAL; RETROBULBAR AS NEEDED
Status: DISCONTINUED | OUTPATIENT
Start: 2023-02-14 | End: 2023-02-14 | Stop reason: HOSPADM

## 2023-02-14 RX ORDER — METHYLERGONOVINE MALEATE 0.2 MG/ML
0.2 INJECTION INTRAVENOUS ONCE
Status: COMPLETED | OUTPATIENT
Start: 2023-02-14 | End: 2023-02-14

## 2023-02-14 RX ORDER — ACETAMINOPHEN 325 MG/1
650 TABLET ORAL
Status: DISCONTINUED | OUTPATIENT
Start: 2023-02-14 | End: 2023-02-17 | Stop reason: HOSPADM

## 2023-02-14 RX ORDER — LEVOTHYROXINE SODIUM 25 UG/1
50 TABLET ORAL
Status: DISCONTINUED | OUTPATIENT
Start: 2023-02-15 | End: 2023-02-17 | Stop reason: HOSPADM

## 2023-02-14 RX ORDER — TRANEXAMIC ACID 100 MG/ML
INJECTION, SOLUTION INTRAVENOUS AS NEEDED
Status: DISCONTINUED | OUTPATIENT
Start: 2023-02-14 | End: 2023-02-14 | Stop reason: HOSPADM

## 2023-02-14 RX ORDER — SODIUM CHLORIDE 0.9 % (FLUSH) 0.9 %
5-40 SYRINGE (ML) INJECTION EVERY 8 HOURS
Status: DISCONTINUED | OUTPATIENT
Start: 2023-02-14 | End: 2023-02-17 | Stop reason: HOSPADM

## 2023-02-14 RX ORDER — EPHEDRINE SULFATE/0.9% NACL/PF 50 MG/5 ML
SYRINGE (ML) INTRAVENOUS AS NEEDED
Status: DISCONTINUED | OUTPATIENT
Start: 2023-02-14 | End: 2023-02-14 | Stop reason: HOSPADM

## 2023-02-14 RX ORDER — LABETALOL 200 MG/1
400 TABLET, FILM COATED ORAL EVERY 8 HOURS
Status: DISCONTINUED | OUTPATIENT
Start: 2023-02-14 | End: 2023-02-17 | Stop reason: HOSPADM

## 2023-02-14 RX ORDER — OXYTOCIN/RINGER'S LACTATE 30/500 ML
87.3 PLASTIC BAG, INJECTION (ML) INTRAVENOUS AS NEEDED
Status: DISCONTINUED | OUTPATIENT
Start: 2023-02-14 | End: 2023-02-16 | Stop reason: ALTCHOICE

## 2023-02-14 RX ORDER — ONDANSETRON 2 MG/ML
INJECTION INTRAMUSCULAR; INTRAVENOUS AS NEEDED
Status: DISCONTINUED | OUTPATIENT
Start: 2023-02-14 | End: 2023-02-14 | Stop reason: HOSPADM

## 2023-02-14 RX ORDER — OXYTOCIN 10 [USP'U]/ML
INJECTION, SOLUTION INTRAMUSCULAR; INTRAVENOUS AS NEEDED
Status: DISCONTINUED | OUTPATIENT
Start: 2023-02-14 | End: 2023-02-14 | Stop reason: HOSPADM

## 2023-02-14 RX ORDER — SODIUM CHLORIDE 9 MG/ML
250 INJECTION, SOLUTION INTRAVENOUS AS NEEDED
Status: DISCONTINUED | OUTPATIENT
Start: 2023-02-14 | End: 2023-02-14 | Stop reason: SDUPTHER

## 2023-02-14 RX ORDER — DOCUSATE SODIUM 100 MG/1
100 CAPSULE, LIQUID FILLED ORAL 2 TIMES DAILY
Status: DISCONTINUED | OUTPATIENT
Start: 2023-02-14 | End: 2023-02-17 | Stop reason: HOSPADM

## 2023-02-14 RX ORDER — SODIUM CHLORIDE 0.9 % (FLUSH) 0.9 %
5-40 SYRINGE (ML) INJECTION AS NEEDED
Status: DISCONTINUED | OUTPATIENT
Start: 2023-02-14 | End: 2023-02-16 | Stop reason: ALTCHOICE

## 2023-02-14 RX ORDER — NIFEDIPINE 30 MG/1
30 TABLET, EXTENDED RELEASE ORAL DAILY
Status: DISCONTINUED | OUTPATIENT
Start: 2023-02-15 | End: 2023-02-14

## 2023-02-14 RX ORDER — SODIUM CHLORIDE 9 MG/ML
250 INJECTION, SOLUTION INTRAVENOUS AS NEEDED
Status: DISCONTINUED | OUTPATIENT
Start: 2023-02-14 | End: 2023-02-14

## 2023-02-14 RX ORDER — SODIUM CHLORIDE, SODIUM LACTATE, POTASSIUM CHLORIDE, CALCIUM CHLORIDE 600; 310; 30; 20 MG/100ML; MG/100ML; MG/100ML; MG/100ML
100 INJECTION, SOLUTION INTRAVENOUS CONTINUOUS
Status: DISCONTINUED | OUTPATIENT
Start: 2023-02-14 | End: 2023-02-17 | Stop reason: HOSPADM

## 2023-02-14 RX ORDER — DIPHENHYDRAMINE HCL 25 MG
25 CAPSULE ORAL
Status: DISCONTINUED | OUTPATIENT
Start: 2023-02-14 | End: 2023-02-17 | Stop reason: HOSPADM

## 2023-02-14 RX ORDER — ALBUTEROL SULFATE 0.83 MG/ML
2.5 SOLUTION RESPIRATORY (INHALATION)
Status: DISCONTINUED | OUTPATIENT
Start: 2023-02-14 | End: 2023-02-17 | Stop reason: HOSPADM

## 2023-02-14 RX ORDER — ZOLPIDEM TARTRATE 5 MG/1
5 TABLET ORAL
Status: DISCONTINUED | OUTPATIENT
Start: 2023-02-14 | End: 2023-02-17 | Stop reason: HOSPADM

## 2023-02-14 RX ORDER — SODIUM CHLORIDE 0.9 % (FLUSH) 0.9 %
5-40 SYRINGE (ML) INJECTION EVERY 8 HOURS
Status: DISCONTINUED | OUTPATIENT
Start: 2023-02-14 | End: 2023-02-16 | Stop reason: ALTCHOICE

## 2023-02-14 RX ORDER — SERTRALINE HYDROCHLORIDE 50 MG/1
75 TABLET, FILM COATED ORAL
Status: DISCONTINUED | OUTPATIENT
Start: 2023-02-14 | End: 2023-02-17 | Stop reason: HOSPADM

## 2023-02-14 RX ORDER — KETOROLAC TROMETHAMINE 30 MG/ML
15 INJECTION, SOLUTION INTRAMUSCULAR; INTRAVENOUS EVERY 6 HOURS
Status: DISCONTINUED | OUTPATIENT
Start: 2023-02-14 | End: 2023-02-14

## 2023-02-14 RX ORDER — MAGNESIUM SULFATE HEPTAHYDRATE 40 MG/ML
1 INJECTION, SOLUTION INTRAVENOUS CONTINUOUS
Status: DISCONTINUED | OUTPATIENT
Start: 2023-02-14 | End: 2023-02-16 | Stop reason: ALTCHOICE

## 2023-02-14 RX ORDER — OXYTOCIN/RINGER'S LACTATE 30/500 ML
10 PLASTIC BAG, INJECTION (ML) INTRAVENOUS AS NEEDED
Status: DISCONTINUED | OUTPATIENT
Start: 2023-02-14 | End: 2023-02-16 | Stop reason: ALTCHOICE

## 2023-02-14 RX ORDER — CALCIUM GLUCONATE 20 MG/ML
1 INJECTION, SOLUTION INTRAVENOUS AS NEEDED
Status: DISCONTINUED | OUTPATIENT
Start: 2023-02-14 | End: 2023-02-16 | Stop reason: ALTCHOICE

## 2023-02-14 RX ORDER — MAGNESIUM SULFATE HEPTAHYDRATE 40 MG/ML
4 INJECTION, SOLUTION INTRAVENOUS ONCE
Status: COMPLETED | OUTPATIENT
Start: 2023-02-14 | End: 2023-02-14

## 2023-02-14 RX ORDER — BUPIVACAINE HYDROCHLORIDE 7.5 MG/ML
INJECTION, SOLUTION INTRASPINAL
Status: DISCONTINUED | OUTPATIENT
Start: 2023-02-14 | End: 2023-02-14

## 2023-02-14 RX ORDER — SODIUM CHLORIDE, SODIUM LACTATE, POTASSIUM CHLORIDE, CALCIUM CHLORIDE 600; 310; 30; 20 MG/100ML; MG/100ML; MG/100ML; MG/100ML
INJECTION, SOLUTION INTRAVENOUS
Status: DISCONTINUED | OUTPATIENT
Start: 2023-02-14 | End: 2023-02-14 | Stop reason: HOSPADM

## 2023-02-14 RX ORDER — ONDANSETRON 2 MG/ML
4 INJECTION INTRAMUSCULAR; INTRAVENOUS
Status: DISCONTINUED | OUTPATIENT
Start: 2023-02-14 | End: 2023-02-17 | Stop reason: HOSPADM

## 2023-02-14 RX ORDER — NALOXONE HYDROCHLORIDE 0.4 MG/ML
0.4 INJECTION, SOLUTION INTRAMUSCULAR; INTRAVENOUS; SUBCUTANEOUS AS NEEDED
Status: DISCONTINUED | OUTPATIENT
Start: 2023-02-14 | End: 2023-02-17 | Stop reason: HOSPADM

## 2023-02-14 RX ORDER — MISOPROSTOL 200 UG/1
400 TABLET ORAL ONCE
Status: COMPLETED | OUTPATIENT
Start: 2023-02-14 | End: 2023-02-14

## 2023-02-14 RX ADMIN — Medication: at 18:55

## 2023-02-14 RX ADMIN — SERTRALINE 75 MG: 50 TABLET, FILM COATED ORAL at 22:15

## 2023-02-14 RX ADMIN — LABETALOL HYDROCHLORIDE 400 MG: 200 TABLET, FILM COATED ORAL at 18:47

## 2023-02-14 RX ADMIN — MISOPROSTOL 400 MCG: 200 TABLET ORAL at 16:20

## 2023-02-14 RX ADMIN — ONDANSETRON 4 MG: 2 INJECTION INTRAMUSCULAR; INTRAVENOUS at 15:57

## 2023-02-14 RX ADMIN — MAGNESIUM SULFATE HEPTAHYDRATE 1 G/HR: 40 INJECTION, SOLUTION INTRAVENOUS at 18:59

## 2023-02-14 RX ADMIN — MAGNESIUM SULFATE HEPTAHYDRATE 4 G: 40 INJECTION, SOLUTION INTRAVENOUS at 18:36

## 2023-02-14 RX ADMIN — Medication 20 MG: at 16:15

## 2023-02-14 RX ADMIN — CEFAZOLIN 2 G: 10 INJECTION, POWDER, FOR SOLUTION INTRAVENOUS at 16:00

## 2023-02-14 RX ADMIN — MISOPROSTOL 600 MCG: 200 TABLET ORAL at 17:31

## 2023-02-14 RX ADMIN — SODIUM CHLORIDE, POTASSIUM CHLORIDE, SODIUM LACTATE AND CALCIUM CHLORIDE 100 ML/HR: 600; 310; 30; 20 INJECTION, SOLUTION INTRAVENOUS at 21:19

## 2023-02-14 RX ADMIN — TRANEXAMIC ACID 1 G: 100 INJECTION, SOLUTION INTRAVENOUS at 16:20

## 2023-02-14 RX ADMIN — SODIUM CHLORIDE, PRESERVATIVE FREE 20 MG: 5 INJECTION INTRAVENOUS at 18:43

## 2023-02-14 RX ADMIN — METHYLERGONOVINE MALEATE 0.2 MG: 0.2 INJECTION, SOLUTION INTRAMUSCULAR; INTRAVENOUS at 16:30

## 2023-02-14 RX ADMIN — LABETALOL HYDROCHLORIDE 400 MG: 200 TABLET, FILM COATED ORAL at 22:15

## 2023-02-14 RX ADMIN — DOCUSATE SODIUM 100 MG: 100 CAPSULE, LIQUID FILLED ORAL at 21:20

## 2023-02-14 RX ADMIN — OXYTOCIN 30 UNITS: 10 INJECTION, SOLUTION INTRAMUSCULAR; INTRAVENOUS at 16:20

## 2023-02-14 RX ADMIN — BUPIVACAINE HYDROCHLORIDE 1.4 ML: 7.5 INJECTION, SOLUTION EPIDURAL; RETROBULBAR at 16:03

## 2023-02-14 RX ADMIN — Medication 10 MG: at 16:10

## 2023-02-14 RX ADMIN — Medication: at 19:16

## 2023-02-14 RX ADMIN — SODIUM CHLORIDE, SODIUM LACTATE, POTASSIUM CHLORIDE, CALCIUM CHLORIDE: 600; 310; 30; 20 INJECTION, SOLUTION INTRAVENOUS at 16:00

## 2023-02-14 RX ADMIN — NIFEDIPINE 30 MG: 30 TABLET, FILM COATED, EXTENDED RELEASE ORAL at 21:20

## 2023-02-14 RX ADMIN — Medication 20 MG: at 16:21

## 2023-02-14 NOTE — ANESTHESIA PROCEDURE NOTES
Spinal Block    Start time: 2/14/2023 3:54 PM  End time: 2/14/2023 4:05 PM  Performed by: Antonio Martin CRNA  Authorized by: Antonio Martin CRNA     Pre-procedure: Indications: primary anesthetic  Preanesthetic Checklist: patient identified, risks and benefits discussed, anesthesia consent, site marked, patient being monitored, timeout performed and fire risk safety assessment completed and verbalized    Timeout Time: 15:54 EST      Spinal Block:   Patient Position:  Seated  Prep Region:  Lumbar  Prep: chlorhexidine      Location:  L3-4  Technique:  Single shot      Med Admin Time: 2/14/2023 4:04 PM    Needle:   Needle Type:   Romaine  Needle Gauge:  25 G  Attempts:  2      Events: CSF confirmed        Assessment:  Insertion:  Uncomplicated  Patient tolerance:  Patient tolerated the procedure well with no immediate complications

## 2023-02-14 NOTE — PROGRESS NOTES
MATERNAL FETAL MEDICINE PROGRESS NOTE    Layla Dorado is a 31 yo  at 30w6d with pregnancy complicated by dichorionic/diamniotic twin pregnancy, IVF pregnancy, chronic hypertension, gestational diabetes, obesity. She was diagnosed with fetal demise of Twin A yesterday. Twin A is presenting maternal left breech. Twin B is cephalic, maternal right. EFW is 906 gms (less than 1st percentile). MVP 4.5 cm. BPP 2/8 - only points for amniotic fluid, there is no tone, no fetal movement, no breathing movements. Umbilical artery dopplers have reversed end-diastolic flow. Layla Dorado received  corticosteroids  and . I discussed the findings with Layla Dorado and her . I recommend immediate delivery. We discussed the possibility that the baby may be transferred to [facility].     Delfin Whitfield MD  Maternal Fetal Medicine

## 2023-02-14 NOTE — BRIEF OP NOTE
Brief Postoperative Note    Patient: Janay Bernal  YOB: 1995  MRN: [id number]    Date of Procedure: 2023     Pre-Op Diagnosis: 32yo [id number] with di/di twins , one liveborn and one stillborn [Z37.3],   IVF pregnancy   IUFD of Baby A   Chronic Hypertension on Medication  Gestational diabetes-diet controlled   Fetal growth restriction   Recent COVID infection   Morbid obesity     Post-Op Diagnosis: Same as preoperative diagnosis. HELLP syndrome    Procedure(s):   SECTION -primary low transverse     Surgeon(s):  Neymar Davis DO    Surgical Assistant: Jack Conklin MD     Anesthesia: Spinal      Estimated Blood Loss (mL): 349    Complications: Hypotension and Other: Uterine atony    Specimens: None    Implants: None    Drains: Guerrero to gravity     Findings: Normal uterus, Baby A in breech presentation, brown tinged/clear amniotic fluid, fetal demise not macerated, no nuchal cord. Baby B in vertex presentation, clear amniotic fluid. Normal appearing fallopian tubes. Polycystic appearing ovaries. Uterine atony present but improved after first layer of hysterotomy was closed, pt received IV pitocin, TXA IV 1gm, Misoprostol 600mcg po, Methergine 25mcg IM. She required brief pressor support during the procedure but was able to be weaned off while in the OR.      Electronically Signed by Brady Cleaning DO on 2023 at 5:31 PM

## 2023-02-14 NOTE — DISCHARGE INSTRUCTIONS
Discharge Instructions for  Section    Patient ID:  Lian Rosales  [id number]  32 y.o.  1995    Continue taking your prenatal vitamins if you are breastfeeding. Follow-up care is a key part of your treatment and safety. Be sure to keep all your scheduled appointments    Activity  Avoid heavy lifting greater than 10lbs for 2 weeks after your surgery  Pelvic rest for 6 weeks, ie, nothing in your vagina for 6 weeks  (no intercourse, tampons, or douching). No tubs for 6 weeks. No driving for 2 weeks and until you are no longer taking prescription pain medications and you can put your foot on the break in a hurry    Limit climbing stairs to only when necessary the first 1-2 weeks. Hold the railing and do not carry your baby up and downstairs at first for safety   You may walk as tolerated, though be care to not over-do it. Walking will assist in overall healing, decrease constipation and bloating. Aundria Pavel feel better more quickly with some daily movement. Diet  Regular diet as tolerated    Wound care  There are several types of incision closures. If you have metal staples in place when you leave the hospital, please call your doctors office to schedule the staple removal as directed at discharge. If you have steri strips covering your incision, you may remove them as they fall off or be sure to remove them about one week after your surgery. Removing them in the shower may be easier. If you have Dermabond, or skin glue, covering your incision, it will fall off slowly in the next few weeks. You may remove it as it begins to peel off. Additional wound care  Clear or reddish drainage from your incision is normal.  It's best to leave it open to the air, but if there is drainage, you may cover the incision lightly with gauze, preferably without tape. Keep the incision clean and dry. Numbness of the skin at or around your incision is normal and the feeling usually returns gradually. Call your doctor if you have increasing drainage from your incision, an unpleasant odor, red streaks, an increase in pain, or if it appears to open. Pain Management  Continue prescription pain medication as written by discharging physician  Over the counter medications such as Tylenol and ibuprofen (Motrin or Advil) are also ideal.  These may be taken together or in alternating doses. You may  take the maximum dose:  Motrin or Advil (generic ibuprofen), either 3 tablets every 6 hours or 4 tablets every 8 hours. Your prescription medication conntains a narcotic mixed with Tylenol,so  you should not take any extra Tylenol or acetaminophen until you have reduced your prescription pain medication. The maximum dose is Tylenol or acetominophen 1000 mg every 6 hours (equivalent to 2 Extra Strength Tylenols every 6 hours). After a few days, begin to replace the prescription medication with over the counter medications. Use the prescription medication if needed for more severe pain or at night. The prescription medication can be addictive if overused. Add heating pad or sitz baths as needed. Constipation  Constipation is normal after surgery, especially while taking prescription narcotic pain medication. Over the counter remedies including ducosate (Colace), take 1-2 capsules 1-2 times daily for soft stool as needed. You may also add/ try milk of magnesia or rectal remedies such as Dulcolax or Fleets enema. Recovery: What to Expect at Home  Fatigue is expected. Try to rest when you can and don't worry about doing housework or other tasks which can wait. The soreness in your incision will improve significantly over the first 2 weeks, but it may take 6-8 weeks before you are completely recovered. Back pain or general body aches or muscle soreness are expected and should improve with acetominophen or ibuprofen.   Leg swelling due to pregnancy and/or IV fluids given in the hospital will take about two weeks to resolve. Most women experience some form of the \"Baby Blues\" after having a baby. Feeling emotional, tearful, frustrated, anxious, sad, and irritable some of the time is normal and go away after about 2 weeks. Adequate rest and help from your family will help. Take breaks from caring for the baby. Call your doctor if your symptoms seem severe, last more than 2 weeks, or seem to be getting worse instead of better. Get help immediately if you have thoughts of wanting to hurt yourself or others! Call your doctor or seek immediate medical care if you have:  Heavy vaginal bleeding, soaking through one or more pads an hour for several hours. Foul-smelling discharge from your vagina or incision. Consistent nausea and vomiting and cannot keep fluids down. Consistent pain that does not get better after you take pain medicine.   Sudden chest pain and shortness of breath  Signs of a blood clot: pain/ swelling/ increasing redness in your lower extremeties  Signs of infection: increased pain in your abdomen or vaginal area; red streaks, warmth, or tenderness of your breasts; fever of 100.5 F or greater

## 2023-02-14 NOTE — ANESTHESIA PREPROCEDURE EVALUATION
Relevant Problems   No relevant active problems       Anesthetic History   No history of anesthetic complications            Review of Systems / Medical History  Patient summary reviewed and pertinent labs reviewed    Pulmonary            Asthma : well controlled       Neuro/Psych         Psychiatric history     Cardiovascular    Hypertension: poorly controlled              Exercise tolerance: >4 METS     GI/Hepatic/Renal     GERD: well controlled    Renal disease: stones       Endo/Other    Diabetes: type 2  Hypothyroidism  Morbid obesity     Other Findings              Physical Exam    Airway  Mallampati: III  TM Distance: 4 - 6 cm  Neck ROM: normal range of motion        Cardiovascular  Regular rate and rhythm,  S1 and S2 normal,  no murmur, click, rub, or gallop             Dental  No notable dental hx       Pulmonary  Breath sounds clear to auscultation               Abdominal         Other Findings            Anesthetic Plan    ASA: 3, emergent  Anesthesia type: spinal            Anesthetic plan and risks discussed with: Patient

## 2023-02-14 NOTE — DISCHARGE SUMMARY
Patient ID:  Julian Hong  [id number]  28 y.o.  1995    Admit Date: 2023    Discharge Date: 2023     Admitting Physician: Luz Monroy MD    Attending Physician: Edward Sullivan DO    Admission Diagnoses: HELLP syndrome, complicating childbirth [P41.29]  30yo O6L2490 with di/di twins at 30w6d, one liveborn and one stillborn [Z37.3],   IVF pregnancy   IUFD of Baby A   Chronic Hypertension on Medication  Gestational diabetes-diet controlled   Fetal growth restriction   Recent COVID infection   Morbid obesity     Procedures for this admission: 1LTCS    Discharge Diagnoses: Same as above with 1LTCS producing a viable infant. Information for the patient's :  Maurice Bravo Pending FD [[id number]]      Information for the patient's :  Noa Martin, Lanie Kasandra Amy [[id number]]          Discharge Disposition:  Home     Discharge Condition:  Stable     Additional Diagnoses: IVF pregnancy   IUFD of Baby A   Chronic Hypertension on Medication  Gestational diabetes-diet controlled   Fetal growth restriction   Recent COVID infection   Morbid obesity   HELLP Syndrome     Maternal Labs: A pos    History of Present Illness:   OB History          4    Para   0    Term                AB   3    Living             SAB   3    IAB        Ectopic        Molar        Multiple        Live Births                  Admitted for  Emergent C/S delivery . Hospital Course:   Patient was admitted as above and delivered via 1LTCS at 28w5d (IUFD twin A, HELLP Syndrome) . Please the chart for details. On POD#2 patient was improving clinically and laboratory values were trending toward normal. Patient strongly desired to be released from the hospital to see her premature child at [facility] NICU. Her clinical examination at discharge was normal. She was discharged with Norco, Motrin, Zoloft, Procardia 30mg XL and prenatal vitamins.  Signs and symptoms of preeclampsia and postoperative concerns were outlined to the patient. Follow-up Care:  Follow up in the office tomorrow for incision and BP check     Signed:  Tyrel Boykin DO  2/14/2023  5:42 PM       Annetta Crane MD  2/16/2023  7:37 PM

## 2023-02-14 NOTE — H&P
History & Physical    Name: Russ Garcia MRN: [id number]  SSN: [id number]    YOB: 1995  Age: 32 y.o. Sex: female        Subjective:     Estimated Date of Delivery: 23  OB History          4    Para   0    Term                AB   3    Living             SAB   3    IAB        Ectopic        Molar        Multiple        Live Births                    Ms. Corby Cowart is admitted with pregnancy at 28w5d for delivery after abnormal monitoring at [facility] today. Pregnancy complicated by di/di twins with IUFD found yesterday of baby A, Baby B today with abnormal BPP 2 (only points for PHILIPPE) with abnormal dopplers. Send by [facility] to L&D for delivery     Pregnancy via IVF di/di twins  Complicated by [facility] on labetalol 400mg po Q8H and Nifedipine 30XL, Diabetes -gestational diet controlled, obesity, fetal growth restriction, recent COVID infection last week     Reports generally not feeling well these last few days    Past Medical History:   Diagnosis Date    ADHD (attention deficit hyperactivity disorder)     Dr. Wyatt Nguyễn    Ankle fracture, left     4x    Asthma attack     Depression, major 2013    Dr. Jah Galo hypertension     Food allergy 2010    beef (severe), tomoato (severe), others.  Dr. Fredricka Gilford    GERD (gastroesophageal reflux disease)     Gestational diabetes     Infertility, female     IUD (intrauterine device) in place 2014    Dr. Amira Smith    Nephrolithiasis     multiple     Panic anxiety syndrome 2013    Dr. Wyatt Nguyễn    Polycystic disease, ovaries      Past Surgical History:   Procedure Laterality Date    HX DILATION AND CURETTAGE  10/2020    HX TONSIL AND ADENOIDECTOMY      LITHOTRIPSY  3 total     Social History     Occupational History    Not on file   Tobacco Use    Smoking status: Never    Smokeless tobacco: Never   Substance and Sexual Activity    Alcohol use: No    Drug use: No    Sexual activity: Yes     Family History   Problem Relation Age of Onset    Hypertension Mother        Allergies   Allergen Reactions    Bactrim [Sulfamethoprim Ds] Rash    Beef Containing Products Itching     Itchy throat or GI issues     Compazine [Prochlorperazine Maleate] Anxiety    Griseofulvin Rash    Macrobid [Nitrofurantoin Monohyd/M-Cryst] Nausea and Vomiting    Tomato Itching     Itchy throat or GI issues      Prior to Admission medications    Medication Sig Start Date End Date Taking? Authorizing Provider   labetaloL (NORMODYNE) 200 mg tablet Take 2 Tablets by mouth every eight (8) hours for 30 days. Indications: high blood pressure, pre-existing hypertension during pregnancy 2/4/23 3/6/23  Bridget Johnson II, MD   NIFEdipine ER (Procardia XL) 30 mg ER tablet Take 1 Tablet by mouth daily. Indications: pre-existing hypertension during pregnancy 2/3/23   Akhil Villeda MD   levothyroxine (SYNTHROID) 50 mcg tablet Take 50 mcg by mouth Daily (before breakfast). Provider, Historical   PNV Comb #2-Iron-FA-Omega 3 29-1-400 mg cmpk Take  by mouth. Provider, Historical   aspirin 81 mg cap Take 81 mg by mouth daily. Provider, Historical   glucose blood VI test strips (OneTouch Ultra Test) strip Check BS 4 times daily, fasting in the AM and 1 hour after meals. 12/21/22   Chris Welch MD   lancets (OneTouch UltraSoft Lancets) misc Check BS 4 times daily, fasting in the AM and 1 hour after meals. 12/21/22   Chris Welch MD   Blood-Glucose Meter (OneTouch Ultra2 Meter) monitoring kit Check BS 4 times daily, fasting in the AM and 1 hour after meals. 12/21/22   Chris Welch MD   alcohol swabs padm Check blood sugar 4 times daily 12/19/22   Chris Welch MD   sertraline (ZOLOFT) 50 mg tablet Take 75 mg by mouth nightly. Provider, Historical   omeprazole (PRILOSEC) 20 mg capsule Take 20 mg by mouth daily.     Provider, Historical   albuterol (PROVENTIL HFA, VENTOLIN HFA, PROAIR HFA) 90 mcg/actuation inhaler Take 2 Puffs by inhalation every four (4) hours as needed for Wheezing. 1/10/18   Eriberto Gibbs MD        Review of Systems: A comprehensive review of systems was negative except for that written in the HPI. Objective:       Visit Vitals  BP (!) 150/100   Pulse 100   SpO2 100%       Physical Exam:  Patient without distress. Respiratory: normal, non-labored breathing  Heart: mild tachycardia   Abdomen: Obese, gravid, non-tender.    Membranes:  Intact  Fetal Heart Rate: Difficult to hear FHTs with doppler, fetal heart rate by US at bedside 120s     Prenatal Labs:   A pos (see prenatal flowsheet)     Assessment/Plan:   30yo  at 28w5d with di/di twins baby A IUFD diagnosed yesterday, baby B with non-reassuring surveillance today sent for delivery      Pregnancy complicated by [facility] on labetalol 400mg po Q8H and Nifedipine 30XL, Diabetes -gestational diet controlled, obesity, fetal growth restriction, recent COVID infection last week     Plan: Admit for delivery  Baby A IUFD, Baby B confirmed FHTs 120s by [address] emergently to OR for delivery  Consent for delivery, possible blood products reviewed  Labs pending at time going back to [facility] aware and present for delivery     Signed By:  Jen Head DO     2023

## 2023-02-15 ENCOUNTER — APPOINTMENT (OUTPATIENT)
Dept: NON INVASIVE DIAGNOSTICS | Age: 28
End: 2023-02-15
Attending: PHYSICIAN ASSISTANT
Payer: COMMERCIAL

## 2023-02-15 LAB
ALBUMIN SERPL-MCNC: 1.6 G/DL (ref 3.5–5)
ALBUMIN SERPL-MCNC: 1.7 G/DL (ref 3.5–5)
ALBUMIN SERPL-MCNC: 1.7 G/DL (ref 3.5–5)
ALBUMIN SERPL-MCNC: 1.8 G/DL (ref 3.5–5)
ALBUMIN SERPL-MCNC: 1.8 G/DL (ref 3.5–5)
ALBUMIN SERPL-MCNC: 1.9 G/DL (ref 3.5–5)
ALBUMIN/GLOB SERPL: 0.5 (ref 1.1–2.2)
ALBUMIN/GLOB SERPL: 0.5 (ref 1.1–2.2)
ALBUMIN/GLOB SERPL: 0.6 (ref 1.1–2.2)
ALP SERPL-CCNC: 131 U/L (ref 45–117)
ALP SERPL-CCNC: 147 U/L (ref 45–117)
ALP SERPL-CCNC: 151 U/L (ref 45–117)
ALP SERPL-CCNC: 153 U/L (ref 45–117)
ALP SERPL-CCNC: 158 U/L (ref 45–117)
ALP SERPL-CCNC: 158 U/L (ref 45–117)
ALT SERPL-CCNC: 212 U/L (ref 12–78)
ALT SERPL-CCNC: 222 U/L (ref 12–78)
ALT SERPL-CCNC: 277 U/L (ref 12–78)
ALT SERPL-CCNC: 293 U/L (ref 12–78)
ALT SERPL-CCNC: 294 U/L (ref 12–78)
ALT SERPL-CCNC: 320 U/L (ref 12–78)
ANION GAP SERPL CALC-SCNC: 4 MMOL/L (ref 5–15)
ANION GAP SERPL CALC-SCNC: 5 MMOL/L (ref 5–15)
ANION GAP SERPL CALC-SCNC: 6 MMOL/L (ref 5–15)
ANION GAP SERPL CALC-SCNC: 6 MMOL/L (ref 5–15)
ANION GAP SERPL CALC-SCNC: 7 MMOL/L (ref 5–15)
ANION GAP SERPL CALC-SCNC: 7 MMOL/L (ref 5–15)
ARTERIAL PATENCY WRIST A: POSITIVE
AST SERPL-CCNC: 163 U/L (ref 15–37)
AST SERPL-CCNC: 198 U/L (ref 15–37)
AST SERPL-CCNC: 273 U/L (ref 15–37)
AST SERPL-CCNC: 321 U/L (ref 15–37)
AST SERPL-CCNC: 373 U/L (ref 15–37)
AST SERPL-CCNC: ABNORMAL U/L (ref 15–37)
BASE DEFICIT BLD-SCNC: 0.1 MMOL/L
BDY SITE: ABNORMAL
BILIRUB SERPL-MCNC: 2.2 MG/DL (ref 0.2–1)
BILIRUB SERPL-MCNC: 2.3 MG/DL (ref 0.2–1)
BILIRUB SERPL-MCNC: 2.8 MG/DL (ref 0.2–1)
BILIRUB SERPL-MCNC: 2.9 MG/DL (ref 0.2–1)
BILIRUB SERPL-MCNC: 3 MG/DL (ref 0.2–1)
BILIRUB SERPL-MCNC: 3.4 MG/DL (ref 0.2–1)
BLD PROD TYP BPU: NORMAL
BNP SERPL-MCNC: 282 PG/ML
BNP SERPL-MCNC: 616 PG/ML
BPU ID: NORMAL
BUN SERPL-MCNC: 26 MG/DL (ref 6–20)
BUN SERPL-MCNC: 27 MG/DL (ref 6–20)
BUN SERPL-MCNC: 30 MG/DL (ref 6–20)
BUN SERPL-MCNC: 33 MG/DL (ref 6–20)
BUN/CREAT SERPL: 19 (ref 12–20)
BUN/CREAT SERPL: 19 (ref 12–20)
BUN/CREAT SERPL: 21 (ref 12–20)
BUN/CREAT SERPL: 22 (ref 12–20)
BUN/CREAT SERPL: 22 (ref 12–20)
BUN/CREAT SERPL: 23 (ref 12–20)
CALCIUM SERPL-MCNC: 7.2 MG/DL (ref 8.5–10.1)
CALCIUM SERPL-MCNC: 7.2 MG/DL (ref 8.5–10.1)
CALCIUM SERPL-MCNC: 7.3 MG/DL (ref 8.5–10.1)
CALCIUM SERPL-MCNC: 7.3 MG/DL (ref 8.5–10.1)
CALCIUM SERPL-MCNC: 7.4 MG/DL (ref 8.5–10.1)
CALCIUM SERPL-MCNC: 7.6 MG/DL (ref 8.5–10.1)
CHLORIDE SERPL-SCNC: 100 MMOL/L (ref 97–108)
CHLORIDE SERPL-SCNC: 101 MMOL/L (ref 97–108)
CHLORIDE SERPL-SCNC: 101 MMOL/L (ref 97–108)
CHLORIDE SERPL-SCNC: 102 MMOL/L (ref 97–108)
CHLORIDE SERPL-SCNC: 103 MMOL/L (ref 97–108)
CHLORIDE SERPL-SCNC: 103 MMOL/L (ref 97–108)
CO2 SERPL-SCNC: 25 MMOL/L (ref 21–32)
CO2 SERPL-SCNC: 27 MMOL/L (ref 21–32)
CO2 SERPL-SCNC: 27 MMOL/L (ref 21–32)
CREAT SERPL-MCNC: 1.15 MG/DL (ref 0.55–1.02)
CREAT SERPL-MCNC: 1.16 MG/DL (ref 0.55–1.02)
CREAT SERPL-MCNC: 1.44 MG/DL (ref 0.55–1.02)
CREAT SERPL-MCNC: 1.52 MG/DL (ref 0.55–1.02)
CREAT SERPL-MCNC: 1.55 MG/DL (ref 0.55–1.02)
CREAT SERPL-MCNC: 1.58 MG/DL (ref 0.55–1.02)
ECHO AO ASC DIAM: 2.5 CM
ECHO AO ASCENDING AORTA INDEX: 1.27 CM/M2
ECHO AV AREA PEAK VELOCITY: 1.9 CM2
ECHO AV AREA VTI: 2 CM2
ECHO AV AREA/BSA PEAK VELOCITY: 1 CM2/M2
ECHO AV AREA/BSA VTI: 1 CM2/M2
ECHO AV MEAN GRADIENT: 6 MMHG
ECHO AV MEAN VELOCITY: 1.2 M/S
ECHO AV PEAK GRADIENT: 12 MMHG
ECHO AV PEAK VELOCITY: 1.7 M/S
ECHO AV VELOCITY RATIO: 0.65
ECHO AV VTI: 31.6 CM
ECHO LA DIAMETER INDEX: 1.78 CM/M2
ECHO LA DIAMETER: 3.5 CM
ECHO LA VOL 2C: 34 ML (ref 22–52)
ECHO LA VOL 4C: 47 ML (ref 22–52)
ECHO LA VOL BP: 44 ML (ref 22–52)
ECHO LA VOL/BSA BIPLANE: 22 ML/M2 (ref 16–34)
ECHO LA VOLUME AREA LENGTH: 47 ML
ECHO LA VOLUME INDEX A2C: 17 ML/M2 (ref 16–34)
ECHO LA VOLUME INDEX A4C: 24 ML/M2 (ref 16–34)
ECHO LA VOLUME INDEX AREA LENGTH: 24 ML/M2 (ref 16–34)
ECHO LV E' LATERAL VELOCITY: 10 CM/S
ECHO LV E' SEPTAL VELOCITY: 9 CM/S
ECHO LV EDV A2C: 73 ML
ECHO LV EDV A4C: 85 ML
ECHO LV EDV BP: 81 ML (ref 56–104)
ECHO LV EDV INDEX A4C: 43 ML/M2
ECHO LV EDV INDEX BP: 41 ML/M2
ECHO LV EDV NDEX A2C: 37 ML/M2
ECHO LV EJECTION FRACTION A2C: 55 %
ECHO LV EJECTION FRACTION A4C: 60 %
ECHO LV EJECTION FRACTION BIPLANE: 59 % (ref 55–100)
ECHO LV ESV A2C: 33 ML
ECHO LV ESV A4C: 34 ML
ECHO LV ESV BP: 33 ML (ref 19–49)
ECHO LV ESV INDEX A2C: 17 ML/M2
ECHO LV ESV INDEX A4C: 17 ML/M2
ECHO LV ESV INDEX BP: 17 ML/M2
ECHO LV FRACTIONAL SHORTENING: 35 % (ref 28–44)
ECHO LV INTERNAL DIMENSION DIASTOLE INDEX: 2.03 CM/M2
ECHO LV INTERNAL DIMENSION DIASTOLIC: 4 CM (ref 3.9–5.3)
ECHO LV INTERNAL DIMENSION SYSTOLIC INDEX: 1.32 CM/M2
ECHO LV INTERNAL DIMENSION SYSTOLIC: 2.6 CM
ECHO LV IVSD: 1.4 CM (ref 0.6–0.9)
ECHO LV MASS 2D: 220.7 G (ref 67–162)
ECHO LV MASS INDEX 2D: 112 G/M2 (ref 43–95)
ECHO LV POSTERIOR WALL DIASTOLIC: 1.5 CM (ref 0.6–0.9)
ECHO LV RELATIVE WALL THICKNESS RATIO: 0.75
ECHO LVOT AREA: 3.1 CM2
ECHO LVOT AV VTI INDEX: 0.68
ECHO LVOT DIAM: 2 CM
ECHO LVOT MEAN GRADIENT: 2 MMHG
ECHO LVOT PEAK GRADIENT: 5 MMHG
ECHO LVOT PEAK VELOCITY: 1.1 M/S
ECHO LVOT STROKE VOLUME INDEX: 34.1 ML/M2
ECHO LVOT SV: 67.2 ML
ECHO LVOT VTI: 21.4 CM
ECHO MV A VELOCITY: 0.84 M/S
ECHO MV E DECELERATION TIME (DT): 210.4 MS
ECHO MV E VELOCITY: 1.11 M/S
ECHO MV E/A RATIO: 1.32
ECHO MV E/E' LATERAL: 11.1
ECHO MV E/E' RATIO (AVERAGED): 11.72
ECHO MV E/E' SEPTAL: 12.33
ECHO PULMONARY ARTERY END DIASTOLIC PRESSURE: 5 MMHG
ECHO PV MAX VELOCITY: 1.5 M/S
ECHO PV PEAK GRADIENT: 9 MMHG
ECHO PV REGURGITANT MAX VELOCITY: 1.2 M/S
ECHO RV INTERNAL DIMENSION: 3.9 CM
ECHO RV TAPSE: 2.3 CM (ref 1.7–?)
ECHO RVOT PEAK GRADIENT: 2 MMHG
ECHO RVOT PEAK VELOCITY: 0.8 M/S
ECHO TV REGURGITANT MAX VELOCITY: 2.06 M/S
ECHO TV REGURGITANT PEAK GRADIENT: 17 MMHG
ERYTHROCYTE [DISTWIDTH] IN BLOOD BY AUTOMATED COUNT: 23.6 % (ref 11.5–14.5)
ERYTHROCYTE [DISTWIDTH] IN BLOOD BY AUTOMATED COUNT: 23.8 % (ref 11.5–14.5)
ERYTHROCYTE [DISTWIDTH] IN BLOOD BY AUTOMATED COUNT: 23.9 % (ref 11.5–14.5)
ERYTHROCYTE [DISTWIDTH] IN BLOOD BY AUTOMATED COUNT: 25.2 % (ref 11.5–14.5)
GAS FLOW.O2 O2 DELIVERY SYS: ABNORMAL
GLOBULIN SER CALC-MCNC: 2.7 G/DL (ref 2–4)
GLOBULIN SER CALC-MCNC: 2.9 G/DL (ref 2–4)
GLOBULIN SER CALC-MCNC: 3.1 G/DL (ref 2–4)
GLOBULIN SER CALC-MCNC: 3.3 G/DL (ref 2–4)
GLUCOSE SERPL-MCNC: 73 MG/DL (ref 65–100)
GLUCOSE SERPL-MCNC: 81 MG/DL (ref 65–100)
GLUCOSE SERPL-MCNC: 83 MG/DL (ref 65–100)
GLUCOSE SERPL-MCNC: 84 MG/DL (ref 65–100)
GLUCOSE SERPL-MCNC: 87 MG/DL (ref 65–100)
GLUCOSE SERPL-MCNC: 97 MG/DL (ref 65–100)
HCO3 BLD-SCNC: 25 MMOL/L (ref 22–26)
HCT VFR BLD AUTO: 23.1 % (ref 35–47)
HCT VFR BLD AUTO: 24.8 % (ref 35–47)
HCT VFR BLD AUTO: 26.5 % (ref 35–47)
HCT VFR BLD AUTO: 27.6 % (ref 35–47)
HGB BLD-MCNC: 7.6 G/DL (ref 11.5–16)
HGB BLD-MCNC: 8 G/DL (ref 11.5–16)
HGB BLD-MCNC: 8.7 G/DL (ref 11.5–16)
HGB BLD-MCNC: 9 G/DL (ref 11.5–16)
MAGNESIUM SERPL-MCNC: 4 MG/DL (ref 1.6–2.4)
MAGNESIUM SERPL-MCNC: 4.4 MG/DL (ref 1.6–2.4)
MAGNESIUM SERPL-MCNC: 4.6 MG/DL (ref 1.6–2.4)
MAGNESIUM SERPL-MCNC: 4.6 MG/DL (ref 1.6–2.4)
MAGNESIUM SERPL-MCNC: 5 MG/DL (ref 1.6–2.4)
MAGNESIUM SERPL-MCNC: NORMAL MG/DL (ref 1.6–2.4)
MCH RBC QN AUTO: 27.4 PG (ref 26–34)
MCH RBC QN AUTO: 27.5 PG (ref 26–34)
MCH RBC QN AUTO: 27.9 PG (ref 26–34)
MCH RBC QN AUTO: 28 PG (ref 26–34)
MCHC RBC AUTO-ENTMCNC: 32.3 G/DL (ref 30–36.5)
MCHC RBC AUTO-ENTMCNC: 32.6 G/DL (ref 30–36.5)
MCHC RBC AUTO-ENTMCNC: 32.8 G/DL (ref 30–36.5)
MCHC RBC AUTO-ENTMCNC: 32.9 G/DL (ref 30–36.5)
MCV RBC AUTO: 83.7 FL (ref 80–99)
MCV RBC AUTO: 83.9 FL (ref 80–99)
MCV RBC AUTO: 84.9 FL (ref 80–99)
MCV RBC AUTO: 86.7 FL (ref 80–99)
NRBC # BLD: 0.41 K/UL (ref 0–0.01)
NRBC # BLD: 0.42 K/UL (ref 0–0.01)
NRBC # BLD: 0.67 K/UL (ref 0–0.01)
NRBC # BLD: 1.09 K/UL (ref 0–0.01)
NRBC BLD-RTO: 10.3 PER 100 WBC
NRBC BLD-RTO: 3.4 PER 100 WBC
NRBC BLD-RTO: 4.3 PER 100 WBC
NRBC BLD-RTO: 6.1 PER 100 WBC
O2/TOTAL GAS SETTING VFR VENT: 2 %
PATH REV BLD -IMP: NORMAL
PCO2 BLD: 42.2 MMHG (ref 35–45)
PH BLD: 7.38 (ref 7.35–7.45)
PLATELET # BLD AUTO: 42 K/UL (ref 150–400)
PLATELET # BLD AUTO: 52 K/UL (ref 150–400)
PLATELET # BLD AUTO: 56 K/UL (ref 150–400)
PLATELET # BLD AUTO: 81 K/UL (ref 150–400)
PMV BLD AUTO: ABNORMAL FL (ref 8.9–12.9)
PO2 BLD: 108 MMHG (ref 80–100)
POTASSIUM SERPL-SCNC: 4.7 MMOL/L (ref 3.5–5.1)
POTASSIUM SERPL-SCNC: 4.8 MMOL/L (ref 3.5–5.1)
POTASSIUM SERPL-SCNC: 4.9 MMOL/L (ref 3.5–5.1)
POTASSIUM SERPL-SCNC: 5.1 MMOL/L (ref 3.5–5.1)
POTASSIUM SERPL-SCNC: 5.4 MMOL/L (ref 3.5–5.1)
POTASSIUM SERPL-SCNC: ABNORMAL MMOL/L (ref 3.5–5.1)
PROT SERPL-MCNC: 4.3 G/DL (ref 6.4–8.2)
PROT SERPL-MCNC: 4.6 G/DL (ref 6.4–8.2)
PROT SERPL-MCNC: 4.8 G/DL (ref 6.4–8.2)
PROT SERPL-MCNC: 4.9 G/DL (ref 6.4–8.2)
PROT SERPL-MCNC: 5 G/DL (ref 6.4–8.2)
PROT SERPL-MCNC: 5.1 G/DL (ref 6.4–8.2)
RBC # BLD AUTO: 2.76 M/UL (ref 3.8–5.2)
RBC # BLD AUTO: 2.86 M/UL (ref 3.8–5.2)
RBC # BLD AUTO: 3.12 M/UL (ref 3.8–5.2)
RBC # BLD AUTO: 3.29 M/UL (ref 3.8–5.2)
SAO2 % BLD: 98.1 % (ref 92–97)
SODIUM SERPL-SCNC: 132 MMOL/L (ref 136–145)
SODIUM SERPL-SCNC: 132 MMOL/L (ref 136–145)
SODIUM SERPL-SCNC: 133 MMOL/L (ref 136–145)
SODIUM SERPL-SCNC: 134 MMOL/L (ref 136–145)
SPECIMEN TYPE: ABNORMAL
STATUS OF UNIT,%ST: NORMAL
UNIT DIVISION, %UDIV: 0
WBC # BLD AUTO: 10.6 K/UL (ref 3.6–11)
WBC # BLD AUTO: 11 K/UL (ref 3.6–11)
WBC # BLD AUTO: 11.9 K/UL (ref 3.6–11)
WBC # BLD AUTO: 9.8 K/UL (ref 3.6–11)

## 2023-02-15 PROCEDURE — 36415 COLL VENOUS BLD VENIPUNCTURE: CPT

## 2023-02-15 PROCEDURE — 74011250637 HC RX REV CODE- 250/637: Performed by: OBSTETRICS & GYNECOLOGY

## 2023-02-15 PROCEDURE — 80053 COMPREHEN METABOLIC PANEL: CPT

## 2023-02-15 PROCEDURE — 83735 ASSAY OF MAGNESIUM: CPT

## 2023-02-15 PROCEDURE — 36600 WITHDRAWAL OF ARTERIAL BLOOD: CPT

## 2023-02-15 PROCEDURE — 93306 TTE W/DOPPLER COMPLETE: CPT

## 2023-02-15 PROCEDURE — 74011000250 HC RX REV CODE- 250: Performed by: OBSTETRICS & GYNECOLOGY

## 2023-02-15 PROCEDURE — 94660 CPAP INITIATION&MGMT: CPT

## 2023-02-15 PROCEDURE — 85027 COMPLETE CBC AUTOMATED: CPT

## 2023-02-15 PROCEDURE — 65410000002 HC RM PRIVATE OB

## 2023-02-15 PROCEDURE — 83880 ASSAY OF NATRIURETIC PEPTIDE: CPT

## 2023-02-15 PROCEDURE — 74011250637 HC RX REV CODE- 250/637: Performed by: STUDENT IN AN ORGANIZED HEALTH CARE EDUCATION/TRAINING PROGRAM

## 2023-02-15 PROCEDURE — 82803 BLOOD GASES ANY COMBINATION: CPT

## 2023-02-15 PROCEDURE — 74011250636 HC RX REV CODE- 250/636: Performed by: OBSTETRICS & GYNECOLOGY

## 2023-02-15 PROCEDURE — 93306 TTE W/DOPPLER COMPLETE: CPT | Performed by: STUDENT IN AN ORGANIZED HEALTH CARE EDUCATION/TRAINING PROGRAM

## 2023-02-15 PROCEDURE — 65270000029 HC RM PRIVATE

## 2023-02-15 RX ORDER — OXYCODONE HYDROCHLORIDE 5 MG/1
5 TABLET ORAL
Status: DISCONTINUED | OUTPATIENT
Start: 2023-02-15 | End: 2023-02-17 | Stop reason: HOSPADM

## 2023-02-15 RX ORDER — LANOLIN ALCOHOL/MO/W.PET/CERES
1 CREAM (GRAM) TOPICAL
Status: DISCONTINUED | OUTPATIENT
Start: 2023-02-15 | End: 2023-02-17 | Stop reason: HOSPADM

## 2023-02-15 RX ORDER — CALCIUM CARBONATE 200(500)MG
400 TABLET,CHEWABLE ORAL AS NEEDED
Status: DISCONTINUED | OUTPATIENT
Start: 2023-02-15 | End: 2023-02-17 | Stop reason: HOSPADM

## 2023-02-15 RX ORDER — FUROSEMIDE 10 MG/ML
20 INJECTION INTRAMUSCULAR; INTRAVENOUS ONCE
Status: COMPLETED | OUTPATIENT
Start: 2023-02-15 | End: 2023-02-15

## 2023-02-15 RX ORDER — OXYCODONE HYDROCHLORIDE 5 MG/1
10 TABLET ORAL
Status: DISCONTINUED | OUTPATIENT
Start: 2023-02-15 | End: 2023-02-17 | Stop reason: HOSPADM

## 2023-02-15 RX ADMIN — FERROUS SULFATE TAB 325 MG (65 MG ELEMENTAL FE) 325 MG: 325 (65 FE) TAB at 13:56

## 2023-02-15 RX ADMIN — SERTRALINE 75 MG: 50 TABLET, FILM COATED ORAL at 21:12

## 2023-02-15 RX ADMIN — NIFEDIPINE 30 MG: 30 TABLET, FILM COATED, EXTENDED RELEASE ORAL at 21:12

## 2023-02-15 RX ADMIN — NIFEDIPINE 30 MG: 30 TABLET, FILM COATED, EXTENDED RELEASE ORAL at 09:08

## 2023-02-15 RX ADMIN — FUROSEMIDE 20 MG: 10 INJECTION, SOLUTION INTRAMUSCULAR; INTRAVENOUS at 04:39

## 2023-02-15 RX ADMIN — MAGNESIUM SULFATE HEPTAHYDRATE 1 G/HR: 40 INJECTION, SOLUTION INTRAVENOUS at 14:57

## 2023-02-15 RX ADMIN — SODIUM CHLORIDE, PRESERVATIVE FREE 20 MG: 5 INJECTION INTRAVENOUS at 21:13

## 2023-02-15 RX ADMIN — DOCUSATE SODIUM 100 MG: 100 CAPSULE, LIQUID FILLED ORAL at 09:08

## 2023-02-15 RX ADMIN — MUPIROCIN: 20 OINTMENT TOPICAL at 21:13

## 2023-02-15 RX ADMIN — LEVOTHYROXINE SODIUM 50 MCG: 0.03 TABLET ORAL at 09:08

## 2023-02-15 RX ADMIN — SODIUM CHLORIDE, PRESERVATIVE FREE 10 ML: 5 INJECTION INTRAVENOUS at 14:05

## 2023-02-15 RX ADMIN — SODIUM CHLORIDE, POTASSIUM CHLORIDE, SODIUM LACTATE AND CALCIUM CHLORIDE 100 ML/HR: 600; 310; 30; 20 INJECTION, SOLUTION INTRAVENOUS at 09:30

## 2023-02-15 RX ADMIN — SODIUM CHLORIDE, PRESERVATIVE FREE 20 MG: 5 INJECTION INTRAVENOUS at 09:08

## 2023-02-15 RX ADMIN — ANTACID TABLETS 400 MG: 500 TABLET, CHEWABLE ORAL at 17:33

## 2023-02-15 RX ADMIN — OXYCODONE 5 MG: 5 TABLET ORAL at 21:13

## 2023-02-15 RX ADMIN — DOCUSATE SODIUM 100 MG: 100 CAPSULE, LIQUID FILLED ORAL at 21:13

## 2023-02-15 NOTE — PROGRESS NOTES
Per MD order placed patient on CPAP 10 using medium full face mask. Patient immediately fell asleep. After several minutes requested to be taken off in order to eat. 1530 hrs- received call from RN requesting nasal pillows for CPAP due to patient refusing full face mask. Corresponded with SALVATORE Mccarty, [address] who ordered CPAP to be reduced to 6. Patient explained the importance of wearing the mask while asleep and that CPAP has been reduced for her comfort. Patient understands and is in agreement to wear. When RT departed room patient asleep wearing medium full face mask with CPAP 6, Fi02 .40.

## 2023-02-15 NOTE — PROGRESS NOTES
2/15/2023  2:53 PM    CM attempted to visit with MOB, MOB attempting to rest.  CM will follow up later.      Neela Barrera

## 2023-02-15 NOTE — PROGRESS NOTES
Labs drawn at 220 were never sent to the lab. Will redraw. Her breathing appears labored, but she isnot short of breath. Visit Vitals  BP (!) 132/95   Pulse 100   Temp 99.2 °F (37.3 °C)   Resp 18   SpO2 90%   Breastfeeding Unknown     Urine output is low. Will check labs prior to fluid bolus versus furosemide.

## 2023-02-15 NOTE — PROGRESS NOTES
1916 - Bedside and Verbal shift change report given to BERTA Wooten (oncoming nurse) by BERTA Contreras and Adi Hankins, RN (offgoing nurse). Report included the following information SBAR, Kardex, Procedure Summary, Intake/Output, MAR, and Recent Results. 2018 - VORB from Dr. Christin Velarde to change pt's nifedipine frequency from daily to q12h.    2120 - RN at pt bedside administering colace and nifedipine at this time. 2215 - RN at pt bedside administering Zoloft and labetalol at this time. 26 - RN at pt bedside drawing labs at this time. [address] from MD to start oxygen via nasal cannula at 2 L/min. MD also wants pt to get chest x-ray. 0000 - Radiology personnel at pt bedside performing chest x-ray. 36 - RN at pt bedside redrawing CMP since previous sample was hemolyzed. 18 - [facility] NICU transport team at pt bedside allowing pt and  to see baby before baby transferred out to [address] - RN at pt bedside drawing labs at this time. 3637 - RN administering lasix at this time. 2461 - Critical platelet count called in from the lab (42). RN to notify MD.    0600 - Pt's /93. Miki Irwin from MD to hold pt's scheduled labetalol at this time. 0730 - Bedside and Verbal shift change report given to CRISTI Brock (oncoming nurse) by BERTA Wooten (offgoing nurse). Report included the following information SBAR, Kardex, Procedure Summary, Intake/Output, MAR, and Recent Results.

## 2023-02-15 NOTE — PROGRESS NOTES
1540- This RN arrives to bedside, C/S decided for fetal intolerance. Fidelia Garcia MD at bedside consenting pt at this time    [phone]- Pt in [facility]- Delivery of baby A and B    1712- Pt leaves OR with this RN and anesthesia team    [phone]- Pt arrives to PACU     1916- SBAR to BERTA Estevez RN

## 2023-02-15 NOTE — CONSULTS
Name: [facility]: [address]   : 1995 Admit Date: 2023   Phone: [phone]  Room:    PCP: Roserodríguez Rogelbhumi  MRN: [id number]   Date: 2/15/2023  Code: Full Code          Chart and notes reviewed. Data reviewed. I review the patient's current medications in the medical record at each encounter. I have evaluated and examined the patient. HPI:    11:00 AM       History was obtained from patient, , and chart. I was asked by Romana Nuñez MD to see Lissette Sandoval in consultation for a chief complaint of sleep apnea. Mrs. Steve Coy is a 32 year female with hx of PCOS admitted with HELLP syndrome,   with di/di twins at 30w6d (IVF pregnancy), one liveborn (transferred to [address] [place] [address]) and one stillborn. Patient and  report  snoring and witnessed apnea throughout her pregnancy. Patient also notes snoring as a kid and had her tonsils and adenoids removed as a result. Her nurse today reports increased lethargy/somnolence. She has a history of asthma and uses prn albuterol at baseline. S/p dose of IV Lasix 20mg early this morning. CXR personally visualized and report reviewed. Moderate cardiomegaly with minimal interstitial edema. WBC 11  Hgb 9.0  Plt 52  Creat 1.4  Mag 5.0  LFTs elevated, but trending down since admission    Past Medical History:   Diagnosis Date    ADHD (attention deficit hyperactivity disorder)     Dr. Radha Guzman    Ankle fracture, left     4x    Asthma attack     Depression, major 2013    Dr. Delfin Perera hypertension     Food allergy 2010    beef (severe), tomoato (severe), others.  Dr. Kacie Jung    GERD (gastroesophageal reflux disease)     Gestational diabetes     HELLP syndrome, complicating childbirth     Infertility, female     IUD (intrauterine device) in place 2014    Dr. Willem Erickson    Nephrolithiasis     multiple     Panic anxiety syndrome 2013    Dr. Radha Guzman    Polycystic disease, ovaries Past Surgical History:   Procedure Laterality Date    HX DILATION AND CURETTAGE  10/2020    HX TONSIL AND ADENOIDECTOMY      LITHOTRIPSY  3 total       Family History   Problem Relation Age of Onset    Hypertension Mother        Social History     Tobacco Use    Smoking status: Never    Smokeless tobacco: Never   Substance Use Topics    Alcohol use: No       Allergies   Allergen Reactions    Bactrim [Sulfamethoprim Ds] Rash    Beef Containing Products Itching     Itchy throat or GI issues     Compazine [Prochlorperazine Maleate] Anxiety    Griseofulvin Rash    Macrobid [Nitrofurantoin Monohyd/M-Cryst] Nausea and Vomiting    Tomato Itching     Itchy throat or GI issues        Current Facility-Administered Medications   Medication Dose Route Frequency    ferrous sulfate tablet 325 mg  1 Tablet Oral DAILY WITH BREAKFAST    albuterol (PROVENTIL VENTOLIN) nebulizer solution 2.5 mg  2.5 mg Nebulization Q4H PRN    levothyroxine (SYNTHROID) tablet 50 mcg  50 mcg Oral ACB    sertraline (ZOLOFT) tablet 75 mg  75 mg Oral QHS    lactated Ringers infusion  100 mL/hr IntraVENous CONTINUOUS    sodium chloride (NS) flush 5-40 mL  5-40 mL IntraVENous Q8H    sodium chloride (NS) flush 5-40 mL  5-40 mL IntraVENous PRN    acetaminophen (TYLENOL) tablet 650 mg  650 mg Oral Q4H PRN    naloxone (NARCAN) injection 0.4 mg  0.4 mg IntraVENous PRN    oxytocin (PITOCIN) 10 unit bolus from bag  10 Units IntraVENous PRN    And    oxytocin (PITOCIN) 30 units in 500 mL infusion  87.3 phillip-units/min IntraVENous PRN    ondansetron (ZOFRAN) injection 4 mg  4 mg IntraVENous Q4H PRN    simethicone (MYLICON) tablet 80 mg  80 mg Oral PRN    docusate sodium (COLACE) capsule 100 mg  100 mg Oral BID    diphenhydrAMINE (BENADRYL) capsule 25 mg  25 mg Oral Q4H PRN    measles, mumps & rubella Vacc (PF) (M-M-R II) injection 0.5 mL  0.5 mL SubCUTAneous PRIOR TO DISCHARGE    rho D immune globulin (RHOGAM) 1,500 unit (300 mcg) injection 0.3 mg  300 mcg IntraMUSCular ONCE PRN    zolpidem (AMBIEN) tablet 5 mg  5 mg Oral QHS PRN    mineral oil-petrolat,wht-water (LUBRIDERM) lotion   Topical Q2H PRN    Nipple Ointment (Wilian Baldwin's)BSR   Topical TID    labetaloL (NORMODYNE) tablet 400 mg  400 mg Oral Q8H    HYDROmorphone 30 mg/30 mL (DILAUDID) PCA   IntraVENous TITRATE    sodium chloride (NS) flush 5-40 mL  5-40 mL IntraVENous Q8H    sodium chloride (NS) flush 5-40 mL  5-40 mL IntraVENous PRN    calcium gluconate 1 gram in sodium chloride (ISO-OSM) 50 mL infusion  1 g IntraVENous PRN    magnesium sulfate 20 gm/500 mL Sterile Water infusion  1 g/hr IntraVENous CONTINUOUS    famotidine (PF) (PEPCID) 20 mg in 0.9% sodium chloride 10 mL injection  20 mg IntraVENous Q12H    NIFEdipine ER (PROCARDIA XL) tablet 30 mg  30 mg Oral Q12H         REVIEW OF SYSTEMS   12 point ROS negative except as stated in the HPI. Physical Exam:   Visit Vitals  /75   Pulse 81   Temp 97.8 °F (36.6 °C)   Resp 18   SpO2 98%   Breastfeeding Unknown       General:  Drowsy, cooperative, no distress, appears stated age. Head:  Normocephalic, without obvious abnormality, atraumatic. Hirsute. Eyes:  Conjunctivae/corneas clear. Nose: Nares normal. Septum midline. Mucosa normal.    Throat: Lips, mucosa, and tongue normal.  Class 4 airway. Neck: Thick, supple, symmetrical, trachea midline, no adenopathy. Lungs: Audible snoring. Clear to auscultation bilaterally. Chest wall:  No tenderness or deformity. Heart:  Regular rate and rhythm, S1, S2 normal, no murmur, click, rub or gallop. Abdomen:   Soft, non-tender. Bowel sounds normal. No masses,  No organomegaly. Extremities: Extremities normal, atraumatic, no cyanosis or edema. Pulses: 2+ and symmetric all extremities.    Skin: Skin color, texture, turgor normal. No rashes or lesions   Lymph nodes: Cervical, supraclavicular nodes normal.   Neurologic: Drowsy/somnolent, otherwise grossly nonfocal.  Follows commands, moves all extremities. Oriented. Lab Results   Component Value Date/Time    Sodium 134 (L) 02/15/2023 08:28 AM    Potassium 4.9 02/15/2023 08:28 AM    Chloride 101 02/15/2023 08:28 AM    CO2 27 02/15/2023 08:28 AM    BUN 30 (H) 02/15/2023 08:28 AM    Creatinine 1.44 (H) 02/15/2023 08:28 AM    Glucose 81 02/15/2023 08:28 AM    Calcium 7.6 (L) 02/15/2023 08:28 AM    Magnesium 5.0 (H) 02/15/2023 08:28 AM    Phosphorus 3.8 06/14/2021 01:02 AM       Lab Results   Component Value Date/Time    WBC 11.0 02/15/2023 08:28 AM    HGB 9.0 (L) 02/15/2023 08:28 AM    PLATELET 52 (L) 16/07/0630 08:28 AM    MCV 83.9 02/15/2023 08:28 AM       Lab Results   Component Value Date/Time    INR 1.1 02/14/2023 06:19 PM    aPTT 29.9 02/14/2023 06:19 PM    Alk.  phosphatase 158 (H) 02/15/2023 08:28 AM    Protein, total 5.0 (L) 02/15/2023 08:28 AM    Albumin 1.9 (L) 02/15/2023 08:28 AM    Globulin 3.1 02/15/2023 08:28 AM       Lab Results   Component Value Date/Time    Iron 25 (L) 03/20/2015 12:02 PM    TIBC 395 03/20/2015 12:02 PM    Iron % saturation 6 (LL) 03/20/2015 12:02 PM    Ferritin 11 (L) 10/06/2015 11:35 AM       Lab Results   Component Value Date/Time    TSH 1.930 03/20/2015 12:02 PM        No results found for: PH, PHI, PCO2, PCO2I, PO2, PO2I, HCO3, HCO3I, FIO2, FIO2I    Lab Results   Component Value Date/Time    Troponin-I, Qt. <0.04 01/10/2018 01:13 AM        Lab Results   Component Value Date/Time    Culture result: MIXED UROGENITAL MONIQUE ISOLATED 06/14/2021 10:11 PM    Culture result: MIXED UROGENITAL MONIQUE ISOLATED 06/10/2021 11:25 PM    Culture result: MIXED UROGENITAL MONIQUE ISOLATED 04/18/2021 12:31 PM       No results found for: TOXA1, RPR, HBCM, HBSAG, HAAB, HCAB1, HAAT, G6PD, CRYAC, HIVGT, HIVR, HIV1, HIV12, HIVPC, HIVRPI    No results found for: VANCT, CPK    Lab Results   Component Value Date/Time    Color YELLOW/STRAW 06/13/2021 10:11 PM    Appearance CLEAR 06/13/2021 10:11 PM    Specific gravity 1.025 08/12/2018 11:57 AM    pH (UA) 6.0 2021 10:11 PM    Protein 30 (A) 2021 10:11 PM    Glucose Negative 2021 10:11 PM    Ketone Negative 2021 10:11 PM    Bilirubin Negative 2021 10:11 PM    Blood LARGE (A) 2021 10:11 PM    Urobilinogen 1.0 2021 10:11 PM    Nitrites Negative 2021 10:11 PM    Leukocyte Esterase SMALL (A) 2021 10:11 PM    WBC 10-20 2021 10:11 PM    RBC >100 (H) 2021 10:11 PM    Bacteria Negative 2021 10:11 PM       IMPRESSION  Snoring  Witnessed apnea  Somnolence  Cardiomegaly and mild edema on CXR  History of asthma  HELLP syndrome,   with di/di twins at 30w6d (IVF pregnancy), one liveborn (transferred to [facility] NICU) and one stillborn  PCOS  Obesity    PLAN  Supplemental O2 to keep sats > 90%  Check ABG. Suspect she will need NIV/PAP therapy. Check ECHO  Prn albuterol nebs  Need outpatient sleep evaluation with PSG      Thank you for allowing us to participate in the care of this patient. We will be happy to follow along in her progress with you.     Bridgette Velasquez

## 2023-02-15 NOTE — LACTATION NOTE
Patient resting. Lactation consultant spoke with staff RN to offer services to pt. Staff RN to speak with pt and will let her know that we can provide services to her if she would like to see us.

## 2023-02-15 NOTE — PROGRESS NOTES
PostPartum Note    Rick Jewell  [id number]  1995  32 y.o.    S:  Ms. Rick Jewell is a 32 y.o. G4 [phone] POD#1 s/p  for IUFD twin A and BPP 2/8 of twin B. She is complicated by HELLP syndrome and labored respirations. Postpartum  - Incisional pain is present but improving with medications  - Tolerating PO sips, will eat breakfast this morning  - Emotional: has not had time to fully process, is doing OK, wants to deal with physical recovery before emotional   - Has not thought about pumping yet   - Voiding via capellan catheter  - [facility] B is at [facility], we have not heard an update  - Baby A is in cooling in room 201     HELLP   - She has no HA/visual changes/ruq pain/nv  - She does not feel dizzy or lightheaded  - She is voiding via capellan catheter    Labored respirations  - She has oxygen on which is helping  - No chest pain and she herself does not find it difficult to breathe  - Breathing has been like this the whole pregnancy     O:   Visit Vitals  /70   Pulse 84   Temp 97.8 °F (36.6 °C)   Resp 18   SpO2 93%   Breastfeeding Unknown       Gen - No acute distress  HEENT: inc facial hair   Respirations - labord rspirations with 2L NC on   Abdomen - Fundus firm below the umbilicus but difficult to palpate 2/2/ habitus, and incision has bandaid on, abd soft and nontender    - no edema, capellan catheter in place   Ext - Warm, well perfused, nontender, BL SCDs in place     Lab Results   Component Value Date/Time    WBC 11.0 02/15/2023 08:28 AM    HGB 9.0 (L) 02/15/2023 08:28 AM    HCT 27.6 (L) 02/15/2023 08:28 AM    PLATELET 52 (L)  08:28 AM    MCV 83.9 02/15/2023 08:28 AM         A/P:  Ms. Rick Jewell is a 32 y.o.  POD#1 s/p  for IUFD twin A and BPP 2/8 of twin B. She is complicated by HELLP syndrome and labored respirations.      CVS/Neuro  - AAO x 3, NAD    HEENT  - No issues    RESP  - 2L NC, satting well  - Will attempt to wean later today  - If persistent labored respirations, consult PULM for CPAP likely sleep apnea  - cxr prelim wnl, formal read not back  yet     CVS  - No acute issues  - BNP elevated  - No chest pain  - HTN controlled on labetalol 400 q8, procardia xl 30 q12, last dose labetalol held    Heme/ID  - HELLP, low PLT - PLT rising, s/p ffp x 1, s/p PLT x 1, s/p PRBC x 1   - H/H stable   - Start iron    - no e/o infection  - SCDs on     Endocrine  - GDMA1, sugars wnl  - Hypothyroidism, continue synthroid 50 q AM       - low uop resolved 350 cc over the lat hour  - s/o lasix x1  - creatinine improving, if does not continue to improve, obtain Fena  - continue capellan   - blood loss within normal, ~ 500   - Mag on for HELLP, mag level rising,  likely 2/2 elevated creatinine, mag at 1, monitor with next lab draw   - LR on     GI  - tolerating sips  - breakfast this AM   - LFTs improving     Routine postpartum  - Discuss woundcare after bandage removal this evening  - Baby B at [facility],follow up update this morning  - Baby A in 201 cooled, follow up w/ CM re: disposition of remaines  - Follow up decision to pump       Samson Schwartz MD  [facility]

## 2023-02-15 NOTE — OP NOTES
[facility] 77  OPERATIVE REPORT    Name:  Nya Villanueva  MR#:  [id number]  :  1995  ACCOUNT #:  [de-identified]  DATE OF SERVICE:  2023    PREOPERATIVE DIAGNOSES:  A 57-year-old  4, para 0-0-3-0 with Di/Di twins, one liveborn and one stillborn, in vitro fertilization pregnancy, intrauterine fetal demise baby A, chronic hypertension on medication, gestational diabetes diet controlled, fetal growth restriction, recent COVID infection, morbid obesity, hemolysis, elevated liver enzymes, and low platelets syndrome. POSTOPERATIVE DIAGNOSES:  A 57-year-old  4, para 0-0-3-0 with Di/Di twins, one liveborn and one stillborn, In vitro fertilization pregnancy, intrauterine fetal demise baby A, chronic hypertension on medication, gestational diabetes diet controlled, fetal growth restriction, recent COVID infection, morbid obesity. PROCEDURE PERFORMED:   primary low transverse. SURGEON:  Janee Coughlin. [address] [place] [address]    ASSISTANT:  Justus Perez MD    ANESTHESIA:  Spinal.    COMPLICATIONS:  Hypertension, uterine atony. SPECIMENS REMOVED:  Placenta to Pathology. IMPLANTS:  None. ESTIMATED BLOOD LOSS:  500 mL. DRAINS:  Guerrero catheter to gravity. FINDINGS:  Normal uterus. Baby A in breech presentation, brown tinged amniotic fluid, fetal demise, nonmacerated, no nuchal cord. Baby B in vertex presentation, clear amniotic fluid, normal-appearing fallopian tubes, polycystic-appearing ovary. Uterine atony present and improved after the first layer of the hysterotomy was closed. The patient received IV Pitocin, tranexamic acid, misoprostol 600 mcg p.o. and Methergine 25 mcg IM. She required brief IV pressor support during the procedure, but was able to be weaned off prior to exiting the OR. INDICATIONS:  The patient presented to high risk OB today for evaluation and monitoring of her pregnancy.   The day prior she has been diagnosed with intrauterine fetal demise of baby A which looks to be growth restricted. Her pregnancy has been complicated by the problems listed above. At her prenatal visit, abnormal fetal monitoring of baby B was identified with BPP of 2 of 8 only points given for amniotic fluid levels. The patient was sent urgently to Labor and Deliver for delivery. She was counseled on the need for emergent delivery via  section, risks of surgery including bleeding, infection, damage to surrounding organs, need for possible blood transfusion were all reviewed with her and questions were answered prior to going to the operating room. PROCEDURE:  The patient was taken to the operating room. After spinal anesthesia was placed, she was positioned supine on the table with left lateral tilt. A traxi device was placed on her abdomen to expose her lower abdomen. She had a Guerrero catheter placed in her bladder. SCDs were applied. She had her abdomen prepped and she was draped in sterile fashion. After a time-out was performed, the level of anesthesia was checked and found to be adequate. A Pfannenstiel incision was made 2 cm above the pubic symphysis. This incision was carried down sharply to the fascia. The fascia was incised with a knife and the fascial opening was extended using [place] scissors. The superior border of the fascia was grasped with Kocher clamps. The median raphae was taken down sharply. The muscles were bluntly dissected off. The rectus muscles were  bluntly in the midline and the peritoneum was entered bluntly using the 's fingers. The peritoneal opening was extended manually by pulling. An Zbigniew retractor was placed. The 's hand was inserted into the abdomen to find the uterus in the nonrotated position. A hysterotomy was made in the lower uterine segment using the knife and entered bluntly using the 's fingers. The hysterotomy was extended in a cephalocaudad manner. The 's hand was inserted into the uterus to find baby B in breech presentation. The fetus was small and able to be delivered easily through the incision with flexion of the fetal head. There was brown-tinged amniotic fluid noted. This baby was . The cord was clamped and cut and the infant was passed to waiting nurses. The second amniotic fluid pack was ruptured with clear fluid. The baby B was noted to be in vertex presentation. The vertex was grasped, flexed and brought to the hysterotomy where the baby was able to be delivered. The infant showed signs of movement and made a small cry. NICU was present in the room and delayed cord clamping was performed for approximately 30 seconds. The cord was then clamped and cut and the baby passed off to NICU team.  At this point, the placenta was manually expressed. The uterus was exteriorized and wiped of all clots and debris. The uterus was then replaced into the abdomen and the hysterotomy was repaired first with a layer of 0 Vicryl suture in a running locked fashion followed by a second layer of 0 Monocryl with imbricate with good hemostasis thereafter. The hemostasis was achieved where needed using the Bovie, but the hemostasis was excellent on the uterus. Initial atony of the uterus improved after closure of hysterotomy and administration of uterotonic agent. The uterus was firm and the decision was made to go ahead and close the abdomen. Peritoneum was closed using a 0 Vicryl suture in a running fashion. The rectus muscles were inspected and hemostasis was achieved where needed using the Bovie. The rectus fascia was then closed with a #1 Vicryl suture in a running fashion. The subcutaneous tissues were irrigated and hemostasis was achieved where needed using the Bovie. The subcutaneous tissues were reapproximated using 3-0 plain gut suture.   The skin was then closed using a 4-0 Monocryl and bandaged with Steri-Strips and a pressure dressing applied. All counts were correct at the end of the case. There were no complications.       Daryl Light DO      [id number]  D:  02/15/2023 15:41  T:  02/15/2023 17:58  JOB #:  [id number]  CC:  Jimmy Cano DO

## 2023-02-15 NOTE — PROGRESS NOTES
Update note:     echo w lventricular hypertrophy concentric  Bnp downtrendinf  No chest pain  Cards consult tomorrow     Cpap on     Mag discontinued  Creatinine improving   Guerrero in place, will attempt to remove if able to use bedside commode or bedpan     All labs trending in the right direction     Follow up cbc 12:45 am, determine need for additional unit     [facility]

## 2023-02-15 NOTE — PROGRESS NOTES
[id number] Bedside SBAR from BERTA Estevez RN. Assumed care of patient. Patient is sleeping, easily awakened. Pt exhibiting signs of sleep apnea. Pt's  states that started with her pregnancy. Patient resting in position of comfort in locked and lowered bed. VSS. Patient denies further needs at this time. Call bell in reach. 6988 Dr. Bonny Betancourt at bedside to discuss plan of care for day. Answered questions regarding HELLP syndrome, medications, and lab results. Pt verbalizes understanding. MD aware of sleep apnea and need for NC 2L.     0943 TORB via Bonny Betancourt MD for BNP drawn at 1230, consult to pulmonology dt patient's breathing difficulties, and inform MD for any signs of mag toxicity or decreased UOP.     5596-1548 RN at bedside discussing pt's plans for Twin A, filling in paperwork. Pt and spouse decline genetic testing, will get back to RN regarding  services and photography. Pt will see lactation tomorrow to discuss pumping. [address] notified of patient's persistent apnea/drowsiness. Per MD, consult to internal medicine (pulmonary not available), pause mag administration, stat mag level, and ABGs. 1056 Mag paused (see MAR). PA at bedside for sleep apnea assessment. Respiratory therapists at bedside for ABG draw. 1113 ABG drawn at this time. [address] drawn at this time    1118 Per RT, CO2 WNL. RT says ok to d/c NC while patient is awake of O2 remains WNL. [address] updated on pt status. Will contact RT for NIV/PAP per pulmonary. 1146 Call to RT for NIV/PAP.     1208 Call to Bonny Betancourt MD to update on pt status, no answer. 5 Per Bonny Betancourt, ok to restart mag administration. MD notified of pt's hyper-reflexia, no further orders. See MAR    1220 RT at bedside with this RN explaining CPAP machine to patient. Pt verbalizes understanding. 1227 CPAP applied, 10L at 40 Fi02    1316 ECHO on unit. 1344 R hand PIV line pulled by patient.  Pressure held to reduce/stop bleeding. 932 [address] Dr. Teri Méndez at bedside for evaluation. Pt sleeping on CPAP. Per MD, hold labetalol dt BP WNL. On license of [facility] 372 called to bedside. Pt tearful, states she cannot wear CPAP. States she was coughing and unable to sit up or communicate her needs. NC 2L placed on pt.    0959 Call to materials management for a K-pad.     1435 K-pad received. 1440 Call to [facility] for k-pad warming unit. 56 Pt's family at bedside, states they brought her a warming pack for her low back discomfort. 1516 Call to RT for \"nasal pillow\" dt patient rejecting full mask. Per RT, not carried on unit. 1533 RT at bedside, CPAP O2 reduced to 6. Facemask replaced, pt fairly tolerated. [id number] Per MD, ok to turn off mag at 24 hours postpartum (see MAR). 46 Pt states her pain has increased since she boosted herself in bed. Encouraged PCA use, educated on need to stay ahead of pain management. 1740 Assisted patient to sitting at bedside. Pt states she will try standing in half an hour. [id number] Pt assisted to standing at bedside. Small steps taken in place. Linens and pad changed. [id number] Bedside SBAR to BARBARA Rogers RN.

## 2023-02-15 NOTE — PROGRESS NOTES
Patient needs to sit up to be comfortable. Her breathing sounds labored, but she denies shortness of breath. Review of Systems   Constitutional:  Positive for malaise/fatigue. Negative for chills and fever. Eyes: Negative. Respiratory: Negative. Cardiovascular: Negative. Gastrointestinal:  Positive for abdominal pain. Negative for nausea and vomiting. Musculoskeletal: Negative. Neurological: Negative. Visit Vitals  BP (!) 132/95   Pulse 100   Temp 99.2 °F (37.3 °C)   Resp 18   SpO2 90%   Breastfeeding Unknown     Patient Vitals for the past 12 hrs:   Temp Pulse Resp BP SpO2   02/14/23 2208 -- 100 18 (!) 132/95 90 %   02/14/23 2138 -- 99 20 (!) 143/103 94 %   02/14/23 2108 -- (!) 104 18 (!) 135/93 94 %   02/14/23 2046 -- (!) 104 19 (!) 134/98 95 %   02/14/23 2031 -- (!) 104 19 (!) 134/99 94 %   02/14/23 2016 -- (!) 106 18 (!) 141/97 97 %   02/14/23 2001 -- (!) 106 19 (!) 140/94 98 %   02/14/23 1946 -- (!) 101 18 (!) 140/93 93 %   02/14/23 1931 99.2 °F (37.3 °C) (!) 108 20 (!) 142/95 95 %   02/14/23 1916 -- (!) 106 20 (!) 148/95 98 %   02/14/23 1901 (!) 101.9 °F (38.8 °C) (!) 108 18 (!) 152/100 99 %   02/14/23 1846 (!) 100.8 °F (38.2 °C) (!) 107 18 (!) 155/100 99 %   02/14/23 1835 99.8 °F (37.7 °C) (!) 105 18 (!) 161/112 99 %   02/14/23 1827 -- (!) 105 19 (!) 157/110 91 %   02/14/23 1817 -- (!) 105 17 (!) 168/113 99 %   02/14/23 1801 -- 97 19 (!) 129/91 94 %   02/14/23 1746 99.1 °F (37.3 °C) 88 19 (!) 141/83 99 %   02/14/23 1738 99 °F (37.2 °C) 90 18 (!) 131/90 99 %   02/14/23 1721 98 °F (36.7 °C) 89 16 (!) 137/92 100 %   02/14/23 1715 98 °F (36.7 °C) 81 19 (!) 137/92 100 %   02/14/23 1544 -- -- -- -- 100 %   02/14/23 1543 -- 100 -- (!) 150/100 --       Physical Exam  Vitals reviewed. Constitutional:       Appearance: She is ill-appearing. HENT:      Head: Normocephalic. Eyes:      Extraocular Movements: Extraocular movements intact.       Pupils: Pupils are equal, round, and reactive to light. Cardiovascular:      Rate and Rhythm: Normal rate and regular rhythm. Heart sounds: No murmur heard. Pulmonary:      Breath sounds: No wheezing. Comments: Decreased breath sounds bilateral lung bases  Abdominal:      General: Abdomen is flat. Palpations: Abdomen is soft. Neurological:      General: No focal deficit present. Mental Status: She is oriented to person, place, and time. Recent Results (from the past 12 hour(s))   CBC WITH AUTOMATED DIFF    Collection Time: 02/14/23  3:37 PM   Result Value Ref Range    WBC 6.6 3.6 - 11.0 K/uL    RBC 3.35 (L) 3.80 - 5.20 M/uL    HGB 9.2 (L) 11.5 - 16.0 g/dL    HCT 28.1 (L) 35.0 - 47.0 %    MCV 83.9 80.0 - 99.0 FL    MCH 27.5 26.0 - 34.0 PG    MCHC 32.7 30.0 - 36.5 g/dL    RDW 23.9 (H) 11.5 - 14.5 %    PLATELET 21 (LL) 485 - 400 K/uL    MPV ABNORMAL 8.9 - 12.9 FL    NRBC 13.5 (H) 0  WBC    ABSOLUTE NRBC 0.89 (H) 0.00 - 0.01 K/uL    NEUTROPHILS 70 32 - 75 %    BAND NEUTROPHILS 5 0 - 6 %    LYMPHOCYTES 19 12 - 49 %    MONOCYTES 6 5 - 13 %    EOSINOPHILS 0 0 - 7 %    BASOPHILS 0 0 - 1 %    IMMATURE GRANULOCYTES 0 %    ABS. NEUTROPHILS 4.9 1.8 - 8.0 K/UL    ABS. LYMPHOCYTES 1.3 0.8 - 3.5 K/UL    ABS. MONOCYTES 0.4 0.0 - 1.0 K/UL    ABS. EOSINOPHILS 0.0 0.0 - 0.4 K/UL    ABS. BASOPHILS 0.0 0.0 - 0.1 K/UL    ABS. IMM.  GRANS. 0.0 K/UL    DF MANUAL      PLATELET COMMENTS DECREASED PLATELETS      RBC COMMENTS ANISOCYTOSIS  3+       TYPE & SCREEN    Collection Time: 02/14/23  3:37 PM   Result Value Ref Range    Crossmatch Expiration 02/17/2023,2359     ABO/Rh(D) A POSITIVE     Antibody screen NEG     Unit number [id number]     Blood component type  LR     Unit division 00     Status of unit ALLOCATED     Crossmatch result Compatible     Unit number [id number]     Blood component type RC LR,2     Unit division 00     Status of unit ISSUED     Crossmatch result Compatible    METABOLIC PANEL, COMPREHENSIVE    Collection Time: 02/14/23  3:37 PM   Result Value Ref Range    Sodium 134 (L) 136 - 145 mmol/L    Potassium 4.9 3.5 - 5.1 mmol/L    Chloride 103 97 - 108 mmol/L    CO2 26 21 - 32 mmol/L    Anion gap 5 5 - 15 mmol/L    Glucose 82 65 - 100 mg/dL    BUN 25 (H) 6 - 20 MG/DL    Creatinine 1.29 (H) 0.55 - 1.02 MG/DL    BUN/Creatinine ratio 19 12 - 20      eGFR 58 (L) >60 ml/min/1.73m2    Calcium 7.8 (L) 8.5 - 10.1 MG/DL    Bilirubin, total 3.9 (H) 0.2 - 1.0 MG/DL    ALT (SGPT) 373 (H) 12 - 78 U/L    AST (SGOT) 448 (H) 15 - 37 U/L    Alk. phosphatase 185 (H) 45 - 117 U/L    Protein, total 5.4 (L) 6.4 - 8.2 g/dL    Albumin 2.0 (L) 3.5 - 5.0 g/dL    Globulin 3.4 2.0 - 4.0 g/dL    A-G Ratio 0.6 (L) 1.1 - 2.2     PROTEIN/CREATININE RATIO, URINE    Collection Time: 02/14/23  3:37 PM   Result Value Ref Range    Protein, urine random 2,402 (H) 0.0 - 11.9 mg/dL    Creatinine, urine random 305.00 mg/dL    Protein/Creat. urine Ratio 7.9     RBC, ALLOCATE    Collection Time: 02/14/23  4:00 PM   Result Value Ref Range    HISTORY CHECKED? Historical check performed    PLATELETS, ALLOCATE    Collection Time: 02/14/23  4:30 PM   Result Value Ref Range    Unit number [id number]     Blood component type PLP,LR PSTC2     Unit division 00     Status of unit ISSUED    CBC WITH AUTOMATED DIFF    Collection Time: 02/14/23  4:31 PM   Result Value Ref Range    WBC 6.5 3.6 - 11.0 K/uL    RBC 2.77 (L) 3.80 - 5.20 M/uL    HGB 7.7 (L) 11.5 - 16.0 g/dL    HCT 23.4 (L) 35.0 - 47.0 %    MCV 84.5 80.0 - 99.0 FL    MCH 27.8 26.0 - 34.0 PG    MCHC 32.9 30.0 - 36.5 g/dL    RDW 23.8 (H) 11.5 - 14.5 %    PLATELET 18 (LL) 056 - 400 K/uL    MPV ABNORMAL 8.9 - 12.9 FL    NRBC 22.7 (H) 0  WBC    ABSOLUTE NRBC 1.47 (H) 0.00 - 0.01 K/uL    NEUTROPHILS 62 32 - 75 %    BAND NEUTROPHILS 5 %    LYMPHOCYTES 24 12 - 49 %    MONOCYTES 8 5 - 13 %    EOSINOPHILS 1 0 - 7 %    BASOPHILS 0 0 - 1 %    IMMATURE GRANULOCYTES 0 0.0 - 0.5 %    ABS. NEUTROPHILS 4.3 1.8 - 8.0 K/UL    ABS. LYMPHOCYTES 1.6 0.8 - 3.5 K/UL    ABS. MONOCYTES 0.5 0.0 - 1.0 K/UL    ABS. EOSINOPHILS 0.1 0.0 - 0.4 K/UL    ABS. BASOPHILS 0.0 0.0 - 0.1 K/UL    ABS. IMM. GRANS. 0.0 0.00 - 0.04 K/UL    DF MANUAL      PLATELET COMMENTS DECREASED PLATELETS      RBC COMMENTS ANISOCYTOSIS  3+       PLASMA, ALLOCATE    Collection Time: 02/14/23  5:00 PM   Result Value Ref Range    Unit number [id number]     Blood component type FP 24h,Thaw1     Unit division 00     Status of unit ALLOCATED    CBC W/O DIFF    Collection Time: 02/14/23  6:19 PM   Result Value Ref Range    WBC 8.1 3.6 - 11.0 K/uL    RBC 3.99 3.80 - 5.20 M/uL    HGB 10.9 (L) 11.5 - 16.0 g/dL    HCT 33.8 (L) 35.0 - 47.0 %    MCV 84.7 80.0 - 99.0 FL    MCH 27.3 26.0 - 34.0 PG    MCHC 32.2 30.0 - 36.5 g/dL    RDW 23.5 (H) 11.5 - 14.5 %    PLATELET 26 (LL) 008 - 400 K/uL    MPV ABNORMAL 8.9 - 12.9 FL    NRBC 12.9 (H) 0  WBC    ABSOLUTE NRBC 1.04 (H) 0.00 - 2.64 K/uL   METABOLIC PANEL, COMPREHENSIVE    Collection Time: 02/14/23  6:19 PM   Result Value Ref Range    Sodium 132 (L) 136 - 145 mmol/L    Potassium 6.4 (H) 3.5 - 5.1 mmol/L    Chloride 101 97 - 108 mmol/L    CO2 26 21 - 32 mmol/L    Anion gap 5 5 - 15 mmol/L    Glucose 71 65 - 100 mg/dL    BUN 25 (H) 6 - 20 MG/DL    Creatinine 1.60 (H) 0.55 - 1.02 MG/DL    BUN/Creatinine ratio 16 12 - 20      eGFR 44 (L) >60 ml/min/1.73m2    Calcium 8.4 (L) 8.5 - 10.1 MG/DL    Bilirubin, total 4.0 (H) 0.2 - 1.0 MG/DL    ALT (SGPT) 417 (H) 12 - 78 U/L    AST (SGOT) 508 (H) 15 - 37 U/L    Alk.  phosphatase 215 (H) 45 - 117 U/L    Protein, total 7.1 6.4 - 8.2 g/dL    Albumin 2.3 (L) 3.5 - 5.0 g/dL    Globulin 4.8 (H) 2.0 - 4.0 g/dL    A-G Ratio 0.5 (L) 1.1 - 2.2     MAGNESIUM    Collection Time: 02/14/23  6:19 PM   Result Value Ref Range    Magnesium 1.6 1.6 - 2.4 mg/dL   FIBRINOGEN    Collection Time: 02/14/23  6:19 PM   Result Value Ref Range    Fibrinogen 607 (H) 200 - 475 mg/dL   PROTHROMBIN TIME + INR    Collection Time: 02/14/23  6:19 PM   Result Value Ref Range    INR 1.1 0.9 - 1.1      Prothrombin time 11.1 9.0 - 11.1 sec   PTT    Collection Time: 02/14/23  6:19 PM   Result Value Ref Range    aPTT 29.9 22.1 - 31.0 sec    aPTT, therapeutic range     58.0 - 77.0 SECS   SAMPLES BEING HELD    Collection Time: 02/14/23  6:19 PM   Result Value Ref Range    SAMPLES BEING HELD 1BLUE     COMMENT        Add-on orders for these samples will be processed based on acceptable specimen integrity and analyte stability, which may vary by analyte.        Active Hospital Problems    Diagnosis Date Noted    HELLP syndrome, complicating childbirth 26/24/2411       Will get labs, and CXR  and BNP  BP meds now labetalol 400 mg po q8h and nifedipine xl 30 mg po q12h  Will check CXR and BNP prior to fluid bolus or furosemide      David Barber MD    2/14/2023

## 2023-02-16 VITALS
WEIGHT: 228 LBS | BODY MASS INDEX: 44.76 KG/M2 | DIASTOLIC BLOOD PRESSURE: 80 MMHG | RESPIRATION RATE: 18 BRPM | HEART RATE: 101 BPM | TEMPERATURE: 98.1 F | HEIGHT: 60 IN | SYSTOLIC BLOOD PRESSURE: 129 MMHG | OXYGEN SATURATION: 96 %

## 2023-02-16 LAB
ABO + RH BLD: NORMAL
ALBUMIN SERPL-MCNC: 1.7 G/DL (ref 3.5–5)
ALBUMIN/GLOB SERPL: 0.6 (ref 1.1–2.2)
ALP SERPL-CCNC: 144 U/L (ref 45–117)
ALT SERPL-CCNC: 165 U/L (ref 12–78)
ANION GAP SERPL CALC-SCNC: 3 MMOL/L (ref 5–15)
AST SERPL-CCNC: 76 U/L (ref 15–37)
BILIRUB SERPL-MCNC: 1.2 MG/DL (ref 0.2–1)
BLD PROD TYP BPU: NORMAL
BLOOD GROUP ANTIBODIES SERPL: NORMAL
BPU ID: NORMAL
BUN SERPL-MCNC: 18 MG/DL (ref 6–20)
BUN/CREAT SERPL: 17 (ref 12–20)
CALCIUM SERPL-MCNC: 7.3 MG/DL (ref 8.5–10.1)
CHLORIDE SERPL-SCNC: 103 MMOL/L (ref 97–108)
CO2 SERPL-SCNC: 28 MMOL/L (ref 21–32)
CREAT SERPL-MCNC: 1.06 MG/DL (ref 0.55–1.02)
CROSSMATCH RESULT,%XM: NORMAL
CROSSMATCH RESULT,%XM: NORMAL
ERYTHROCYTE [DISTWIDTH] IN BLOOD BY AUTOMATED COUNT: 25.4 % (ref 11.5–14.5)
GLOBULIN SER CALC-MCNC: 3 G/DL (ref 2–4)
GLUCOSE SERPL-MCNC: 87 MG/DL (ref 65–100)
HCT VFR BLD AUTO: 22.7 % (ref 35–47)
HGB BLD-MCNC: 7.2 G/DL (ref 11.5–16)
MCH RBC QN AUTO: 27.9 PG (ref 26–34)
MCHC RBC AUTO-ENTMCNC: 31.7 G/DL (ref 30–36.5)
MCV RBC AUTO: 88 FL (ref 80–99)
NRBC # BLD: 0.28 K/UL (ref 0–0.01)
NRBC BLD-RTO: 2.5 PER 100 WBC
PLATELET # BLD AUTO: 98 K/UL (ref 150–400)
PMV BLD AUTO: ABNORMAL FL (ref 8.9–12.9)
POTASSIUM SERPL-SCNC: 4.3 MMOL/L (ref 3.5–5.1)
PROT SERPL-MCNC: 4.7 G/DL (ref 6.4–8.2)
RBC # BLD AUTO: 2.58 M/UL (ref 3.8–5.2)
SODIUM SERPL-SCNC: 134 MMOL/L (ref 136–145)
SPECIMEN EXP DATE BLD: NORMAL
STATUS OF UNIT,%ST: NORMAL
UNIT DIVISION, %UDIV: 0
WBC # BLD AUTO: 11.3 K/UL (ref 3.6–11)

## 2023-02-16 PROCEDURE — 74011250637 HC RX REV CODE- 250/637: Performed by: STUDENT IN AN ORGANIZED HEALTH CARE EDUCATION/TRAINING PROGRAM

## 2023-02-16 PROCEDURE — 74011000250 HC RX REV CODE- 250: Performed by: OBSTETRICS & GYNECOLOGY

## 2023-02-16 PROCEDURE — 74011250637 HC RX REV CODE- 250/637: Performed by: OBSTETRICS & GYNECOLOGY

## 2023-02-16 PROCEDURE — 74011250636 HC RX REV CODE- 250/636: Performed by: OBSTETRICS & GYNECOLOGY

## 2023-02-16 PROCEDURE — 93005 ELECTROCARDIOGRAM TRACING: CPT

## 2023-02-16 PROCEDURE — 85027 COMPLETE CBC AUTOMATED: CPT

## 2023-02-16 PROCEDURE — 99232 SBSQ HOSP IP/OBS MODERATE 35: CPT | Performed by: SPECIALIST

## 2023-02-16 PROCEDURE — 36415 COLL VENOUS BLD VENIPUNCTURE: CPT

## 2023-02-16 PROCEDURE — 80053 COMPREHEN METABOLIC PANEL: CPT

## 2023-02-16 RX ORDER — IBUPROFEN 600 MG/1
TABLET ORAL
Qty: 21 TABLET | Refills: 0 | Status: SHIPPED | OUTPATIENT
Start: 2023-02-16 | End: 2023-02-23

## 2023-02-16 RX ORDER — HYDROCODONE BITARTRATE AND ACETAMINOPHEN 5; 325 MG/1; MG/1
1 TABLET ORAL
Qty: 10 TABLET | Refills: 0 | Status: SHIPPED | OUTPATIENT
Start: 2023-02-16 | End: 2023-02-19

## 2023-02-16 RX ORDER — IBUPROFEN 800 MG/1
800 TABLET ORAL
Status: DISCONTINUED | OUTPATIENT
Start: 2023-02-16 | End: 2023-02-17 | Stop reason: HOSPADM

## 2023-02-16 RX ADMIN — DOCUSATE SODIUM 100 MG: 100 CAPSULE, LIQUID FILLED ORAL at 09:15

## 2023-02-16 RX ADMIN — LABETALOL HYDROCHLORIDE 400 MG: 200 TABLET, FILM COATED ORAL at 01:36

## 2023-02-16 RX ADMIN — OXYCODONE 5 MG: 5 TABLET ORAL at 06:39

## 2023-02-16 RX ADMIN — SODIUM CHLORIDE, POTASSIUM CHLORIDE, SODIUM LACTATE AND CALCIUM CHLORIDE 100 ML/HR: 600; 310; 30; 20 INJECTION, SOLUTION INTRAVENOUS at 03:54

## 2023-02-16 RX ADMIN — IBUPROFEN 800 MG: 800 TABLET, FILM COATED ORAL at 09:32

## 2023-02-16 RX ADMIN — NIFEDIPINE 30 MG: 30 TABLET, FILM COATED, EXTENDED RELEASE ORAL at 09:15

## 2023-02-16 RX ADMIN — LEVOTHYROXINE SODIUM 50 MCG: 0.03 TABLET ORAL at 06:39

## 2023-02-16 RX ADMIN — SODIUM CHLORIDE, PRESERVATIVE FREE 20 MG: 5 INJECTION INTRAVENOUS at 09:15

## 2023-02-16 RX ADMIN — OXYCODONE 10 MG: 5 TABLET ORAL at 01:36

## 2023-02-16 RX ADMIN — FERROUS SULFATE TAB 325 MG (65 MG ELEMENTAL FE) 325 MG: 325 (65 FE) TAB at 09:15

## 2023-02-16 NOTE — PROGRESS NOTES
0733 Bedside SBAR rec'd from BARBARA Rogers RN. Patient and  asleep. [address]. Margaux Dominguez to notify of AM BP (see flowsheet) and ask about AM BP meds. Ordered to hold labetalol and give nifedipine. 1146 Patient ambulated with assistance in room (approx. 20 ft in total) and is sitting in recliner. Tolerated well. Patient's partner in room, and patient's personal belongings are within reach. Linens changed. Will continue to monitor. 2368 Dr. Margaux Dominguez called with lab results; no orders rec'd.   [id number] Patient ambulated to visit daughter in room [phone] Patient and partner asked to speak to this RN about possibility of early discharge to be able to visit  son at [facility]. After speaking to Dr. Cinthia Damon, this RN discussed with patient and family that ideally patient would stay overnight for continued monitoring. Patient and partner feel that they need to go see their child, and agreed to follow-up with Dr. Margaux Dominguez tomorrow, Friday, in office. 200 Dr. Cinthia Damon called and advised of patient and partner's wishes. He advised that he will be on the unit momentarily. 3444 Dr. Cinthia Damon at bedside to speak with patient and family; MD discussed discharge instructions, obtained pharmacy information, and reviewed patient's home medications; patient instructed to follow up with Dr. Prashanth Pineda office tomorrow. 155 Bedside SBAR given to BARBARA Perez RN.

## 2023-02-16 NOTE — CONSULTS
[facility]                       Cardiology Care Note     [x]Initial Encounter     []Follow-up    Patient Name: Lissette Sandoval - YNN: - VSZ:[id number]  Primary Cardiologist:   Consulting Cardiologist: [facility] Card[facility] Physicians: Brooke De La Cruz MD     Reason for encounter:  \"LV hypertrophy\"    HPI:      A 75-year-old with history of morbid obesity, PCOS, HTN, gestational DM, recent COVID, admitted with HELLP. She had a  on 23 at 29 w5d - one baby is at [address] [place] [address] and one was stillborn. Cardiology asked to see for hypertrophy on Echo. Echo 2/15/23 - mod concentric LVH. LVEF 55-60%, RA / LA size normal   - I independently viewed and interpreted the test images myself. She says she feels fine today. Sounds like she has untreated PAU. Had some volume increase with pregnancy. Maybe a little SOB, but does not complain of it. No CP. No history of heart problems. Has not been follow BP at home, but has been seeing doctors for fertility treatment and has not had issues with HTN until this pregnancy. Assessment and Plan       1) LVH  - Echo 2/15/23 - mod concentric LVH.  LVEF 55-60%, RA / LA size normal   - LVH likely related to her Morbid obesity, untreated PAU, and HTN  - Doubt she has hypertrophic cardiomyopathy (EKG and echo not typical for HCM), but we can look into this more outpatient.    - Focus should be on good control of HTN, weight loss, treating PAU, etc  - will follow further as outpatient     2) HTN  - she says HTN was diagnosed during pregnancy    - BP OK in the hospital - cont labetalol and nifedipine     3) Volume overload   - CXR 23 - Moderate cardiomegaly with minimal interstitial edema   - proBNP 616--> 282  - part of volume overload could be related to pregnancy alone   - She is feeling fine without sig SOB (She received a dose of IV lasix on 2/15/23)  - Can use further lasix as needed    - would cut back IV hydration if possible     4) HELLP syndrome   - per OB - numbers are improving        ____________________________________________________________    Cardiac testing       EKG 1/10/18 - NSR, normal  EKG 2/16/23 - NSR, PRWP      ECHO ADULT COMPLETE 02/15/2023 2/15/2023  Interpretation Summary    Left Ventricle: Normal left ventricular systolic function with a visually estimated EF of 60 - 65%. Left ventricle size is normal. Moderately increased wall thickness. Findings consistent with concentric hypertrophy. Normal wall motion. Normal diastolic function. CXR 2/14/23 - Moderate cardiomegaly with minimal interstitial edema           Past Medical History:  Past Medical History:   Diagnosis Date    ADHD (attention deficit hyperactivity disorder) 2008    Dr. Stefanie Mendiola    Ankle fracture, left     4x    Asthma attack     Depression, major 08/2013    Dr. Subhash Powers hypertension     Food allergy 2010    beef (severe), tomoato (severe), others. Dr. Jean Claude Anderson    GERD (gastroesophageal reflux disease)     Gestational diabetes     HELLP syndrome, complicating childbirth 5/74/1281    Infertility, female     IUD (intrauterine device) in place 07/2014    Dr. Berhane Lim    Nephrolithiasis     multiple     Panic anxiety syndrome 08/2013    Dr. Stefanie Mendiola    Polycystic disease, ovaries        Past Surgical History:   Procedure Laterality Date    HX DILATION AND CURETTAGE  10/2020    HX TONSIL AND ADENOIDECTOMY      LITHOTRIPSY  3 total       Social Hx:  reports that she has never smoked. She has never used smokeless tobacco. She reports that she does not drink alcohol and does not use drugs. Family Hx: family history includes Hypertension in her mother.          Review of Systems:    [x]All other systems reviewed and all negative except as written in HPI    [] Patient unable to provide secondary to condition          OBJECTIVE:  Wt Readings from Last 3 Encounters:   02/15/23 228 lb (103.4 kg)   02/03/23 228 lb (103.4 kg)   01/23/23 223 lb (101.2 kg)       Intake/Output Summary (Last 24 hours) at 2/16/2023 0828  Last data filed at 2/16/2023 0354  Gross per 24 hour   Intake 2488. 33 ml   Output 1910 ml   Net 578.33 ml       Physical Exam:    Vitals:   Vitals:    02/16/23 0120 02/16/23 0320 02/16/23 0520 02/16/23 0700   BP: (!) 145/79 99/62 112/72    Pulse: 96  86 87   Resp: 20      Temp: 98.7 °F (37.1 °C)      SpO2: 93% 95% 94%    Weight:       Height:           Gen: Well-developed, well-nourished, in no acute distress  Neck: Supple, No JVD  Resp: No accessory muscle use, Clear breath sounds, No rales or rhonchi  Card: Regular Rate,Rythm, Normal S1, S2, No murmurs, rubs or gallop.   Abd:   Soft, non-tender, non-distended, BS+   MSK: No cyanosis  Skin: No rashes    Neuro: Moving all four extremities, follows commands appropriately  Psych: Good insight, oriented to person, place, alert, Nml Affect  LE: No sig edema    Data Review:     Labs:  Recent Results (from the past 24 hour(s))   MAGNESIUM    Collection Time: 02/15/23 10:57 AM   Result Value Ref Range    Magnesium 4.6 (H) 1.6 - 2.4 mg/dL   POC G3 - PUL    Collection Time: 02/15/23 11:16 AM   Result Value Ref Range    FIO2 (POC) 2 %    pH (POC) 7.38 7.35 - 7.45      pCO2 (POC) 42.2 35.0 - 45.0 MMHG    pO2 (POC) 108 (H) 80 - 100 MMHG    HCO3 (POC) 25.0 22 - 26 MMOL/L    sO2 (POC) 98.1 (H) 92 - 97 %    Base deficit (POC) 0.1 mmol/L    Site RIGHT RADIAL      Device: NASAL CANNULA      Allens test (POC) Positive      Specimen type (POC) ARTERIAL     CBC W/O DIFF    Collection Time: 02/15/23 12:45 PM   Result Value Ref Range    WBC 9.8 3.6 - 11.0 K/uL    RBC 2.76 (L) 3.80 - 5.20 M/uL    HGB 7.6 (L) 11.5 - 16.0 g/dL    HCT 23.1 (L) 35.0 - 47.0 %    MCV 83.7 80.0 - 99.0 FL    MCH 27.5 26.0 - 34.0 PG    MCHC 32.9 30.0 - 36.5 g/dL    RDW 23.8 (H) 11.5 - 14.5 %    PLATELET 56 (L) 290 - 400 K/uL    NRBC 4.3 (H) 0  WBC    ABSOLUTE NRBC 0.42 (H) 0.00 - 4.20 K/uL   METABOLIC PANEL, COMPREHENSIVE    Collection Time: 02/15/23 12:45 PM   Result Value Ref Range    Sodium 133 (L) 136 - 145 mmol/L    Potassium 4.7 3.5 - 5.1 mmol/L    Chloride 101 97 - 108 mmol/L    CO2 25 21 - 32 mmol/L    Anion gap 7 5 - 15 mmol/L    Glucose 73 65 - 100 mg/dL    BUN 27 (H) 6 - 20 MG/DL    Creatinine 1.15 (H) 0.55 - 1.02 MG/DL    BUN/Creatinine ratio 23 (H) 12 - 20      eGFR >60 >60 ml/min/1.73m2    Calcium 7.3 (L) 8.5 - 10.1 MG/DL    Bilirubin, total 2.2 (H) 0.2 - 1.0 MG/DL    ALT (SGPT) 222 (H) 12 - 78 U/L    AST (SGOT) 198 (H) 15 - 37 U/L    Alk.  phosphatase 131 (H) 45 - 117 U/L    Protein, total 4.3 (L) 6.4 - 8.2 g/dL    Albumin 1.6 (L) 3.5 - 5.0 g/dL    Globulin 2.7 2.0 - 4.0 g/dL    A-G Ratio 0.6 (L) 1.1 - 2.2     MAGNESIUM    Collection Time: 02/15/23 12:45 PM   Result Value Ref Range    Magnesium 4.4 (H) 1.6 - 2.4 mg/dL   NT-PRO BNP    Collection Time: 02/15/23 12:45 PM   Result Value Ref Range    NT pro- (H) <125 PG/ML   ECHO ADULT COMPLETE    Collection Time: 02/15/23  1:40 PM   Result Value Ref Range    IVSd 1.4 (A) 0.6 - 0.9 cm    LVIDd 4.0 3.9 - 5.3 cm    LVIDs 2.6 cm    LVOT Diameter 2.0 cm    LVPWd 1.5 (A) 0.6 - 0.9 cm    EF BP 59 55 - 100 %    LV Ejection Fraction A2C 55 %    LV Ejection Fraction A4C 60 %    LV EDV A2C 73 mL    LV EDV A4C 85 mL    LV EDV BP 81 56 - 104 mL    LV ESV A2C 33 mL    LV ESV A4C 34 mL    LV ESV BP 33 19 - 49 mL    LVOT Peak Gradient 5 mmHg    LVOT Mean Gradient 2 mmHg    LVOT SV 67.2 ml    LVOT Peak Velocity 1.1 m/s    LVOT VTI 21.4 cm    RVIDd 3.9 cm    RVOT Peak Gradient 2 mmHg    RVOT Peak Velocity 0.8 m/s    LA Diameter 3.5 cm    LA Volume A/L 47 mL    LA Volume 2C 34 22 - 52 mL    LA Volume 4C 47 22 - 52 mL    LA Volume BP 44 22 - 52 mL    AV Area by Peak Velocity 1.9 cm2    AV Area by VTI 2.0 cm2    AV Peak Gradient 12 mmHg    AV Mean Gradient 6 mmHg    AV Peak Velocity 1.7 m/s    AV Mean Velocity 1.2 m/s    AV VTI 31.6 cm    MV A Velocity 0.84 m/s    MV E Wave Deceleration Time 210.4 ms    MV E Velocity 1.11 m/s    LV E' Lateral Velocity 10 cm/s    LV E' Septal Velocity 9 cm/s    Pulmonary Artery EDP 5 mmHg    MA Max Velocity 1.2 m/s    PV Peak Gradient 9 mmHg    PV Max Velocity 1.5 m/s    TAPSE 2.3 1.7 cm    TR Peak Gradient 17 mmHg    TR Max Velocity 2.06 m/s    Ascending Aorta 2.5 cm    Fractional Shortening 2D 35 28 - 44 %    LV ESV Index BP 17 mL/m2    LV EDV Index BP 41 mL/m2    LV ESV Index A4C 17 mL/m2    LV EDV Index A4C 43 mL/m2    LV ESV Index A2C 17 mL/m2    LV EDV Index A2C 37 mL/m2    LVIDd Index 2.03 cm/m2    LVIDs Index 1.32 cm/m2    LV RWT Ratio 0.75     LV Mass 2D 220.7 (A) 67 - 162 g    LV Mass 2D Index 112.0 (A) 43 - 95 g/m2    MV E/A 1.32     E/E' Ratio (Averaged) 11.72     E/E' Lateral 11.10     E/E' Septal 12.33     LA Volume Index BP 22 16 - 34 ml/m2    LA Volume Index A/L 24 16 - 34 mL/m2    LVOT Stroke Volume Index 34.1 mL/m2    LVOT Area 3.1 cm2    LA Volume Index 2C 17 16 - 34 mL/m2    LA Volume Index 4C 24 16 - 34 mL/m2    LA Size Index 1.78 cm/m2    Ascending Aorta Index 1.27 cm/m2    AV Velocity Ratio 0.65     LVOT:AV VTI Index 0.68     CICI/BSA VTI 1.0 cm2/m2    CICI/BSA Peak Velocity 1.0 cm2/m2   CBC W/O DIFF    Collection Time: 02/15/23  9:28 PM   Result Value Ref Range    WBC 11.9 (H) 3.6 - 11.0 K/uL    RBC 2.86 (L) 3.80 - 5.20 M/uL    HGB 8.0 (L) 11.5 - 16.0 g/dL    HCT 24.8 (L) 35.0 - 47.0 %    MCV 86.7 80.0 - 99.0 FL    MCH 28.0 26.0 - 34.0 PG    MCHC 32.3 30.0 - 36.5 g/dL    RDW 25.2 (H) 11.5 - 14.5 %    PLATELET 81 (L) 634 - 400 K/uL    MPV ABNORMAL 8.9 - 12.9 FL    NRBC 3.4 (H) 0  WBC    ABSOLUTE NRBC 0.41 (H) 0.00 - 7.85 K/uL   METABOLIC PANEL, COMPREHENSIVE    Collection Time: 02/15/23  9:30 PM   Result Value Ref Range    Sodium 134 (L) 136 - 145 mmol/L    Potassium 4.8 3.5 - 5.1 mmol/L    Chloride 100 97 - 108 mmol/L    CO2 27 21 - 32 mmol/L    Anion gap 7 5 - 15 mmol/L    Glucose 97 65 - 100 mg/dL    BUN 26 (H) 6 - 20 MG/DL    Creatinine 1.16 (H) 0.55 - 1.02 MG/DL    BUN/Creatinine ratio 22 (H) 12 - 20      eGFR >60 >60 ml/min/1.73m2    Calcium 7.3 (L) 8.5 - 10.1 MG/DL    Bilirubin, total 2.3 (H) 0.2 - 1.0 MG/DL    ALT (SGPT) 212 (H) 12 - 78 U/L    AST (SGOT) 163 (H) 15 - 37 U/L    Alk. phosphatase 153 (H) 45 - 117 U/L    Protein, total 4.8 (L) 6.4 - 8.2 g/dL    Albumin 1.7 (L) 3.5 - 5.0 g/dL    Globulin 3.1 2.0 - 4.0 g/dL    A-G Ratio 0.5 (L) 1.1 - 2.2               Current medications and allergies:     Allergy:  Allergies   Allergen Reactions    Bactrim [Sulfamethoprim Ds] Rash    Beef Containing Products Itching     Itchy throat or GI issues     Compazine [Prochlorperazine Maleate] Anxiety    Griseofulvin Rash    Macrobid [Nitrofurantoin Monohyd/M-Cryst] Nausea and Vomiting    Tomato Itching     Itchy throat or GI issues          Current Facility-Administered Medications:     ferrous sulfate tablet 325 mg, 1 Tablet, Oral, DAILY WITH BREAKFAST, Jennifer Prince MD, 325 mg at 02/15/23 1356    calcium carbonate (TUMS) chewable tablet 400 mg [elemental], 400 mg, Oral, PRN, Jennifer Tatum MD, 400 mg at 02/15/23 1733    oxyCODONE IR (ROXICODONE) tablet 5 mg, 5 mg, Oral, Q4H PRN, 5 mg at 02/16/23 0639 **OR** oxyCODONE IR (ROXICODONE) tablet 10 mg, 10 mg, Oral, Q4H PRN, Augusto Capps MD, 10 mg at 02/16/23 0136    albuterol (PROVENTIL VENTOLIN) nebulizer solution 2.5 mg, 2.5 mg, Nebulization, Q4H PRN, Aaron Saha MD    levothyroxine (SYNTHROID) tablet 50 mcg, 50 mcg, Oral, ACB, Aaron Saha MD, 50 mcg at 02/16/23 2519    sertraline (ZOLOFT) tablet 75 mg, 75 mg, Oral, QHS, Aaron Saha MD, 75 mg at 02/15/23 2112    lactated Ringers infusion, 100 mL/hr, IntraVENous, CONTINUOUS, Aaron Saha MD, Last Rate: 100 mL/hr at 02/16/23 0354, 100 mL/hr at 02/16/23 0354    sodium chloride (NS) flush 5-40 mL, 5-40 mL, IntraVENous, Q8H, Aaron Saha MD    acetaminophen (TYLENOL) tablet 650 mg, 650 mg, Oral, Q4H PRN, Renay Wei MD    [facility]) injection 0.4 mg, 0.4 mg, IntraVENous, PRN, Renay Wei MD    ondansetron [facility]) injection 4 mg, 4 mg, IntraVENous, Q4H PRN, Renay Wei MD    simethicone (MYLICON) tablet 80 mg, 80 mg, Oral, PRN, Renay Wei MD    docusate sodium (COLACE) capsule 100 mg, 100 mg, Oral, BID, Renay Wei MD, 100 mg at 02/15/23 2113    diphenhydrAMINE (BENADRYL) capsule 25 mg, 25 mg, Oral, Q4H PRN, Renay Wei MD    measles, mumps & rubella Vacc (PF) (M-M-R II) injection 0.5 mL, 0.5 mL, SubCUTAneous, PRIOR TO DISCHARGE, Renay Wei MD    zolpidem (AMBIEN) tablet 5 mg, 5 mg, Oral, QHS PRN, Renay Wei MD    mineral oil-petrolat,wht-water (LUBRIDERM) lotion, , Topical, Q2H PRN, Renay Wei MD    Nipple Ointment Sarah Offer Baldwin's)BSR, , Topical, TID, Renay Wei MD, Given at 02/15/23 2113    labetaloL (NORMODYNE) tablet 400 mg, 400 mg, Oral, Q8H, Renay Wei MD, 400 mg at 02/16/23 0136    famotidine (PF) (PEPCID) 20 mg in 0.9% sodium chloride 10 mL injection, 20 mg, IntraVENous, Q12H, Renay Wei MD, 20 mg at 02/15/23 2113    NIFEdipine ER (PROCARDIA XL) tablet 30 mg, 30 mg, Oral, Q12H, Renay Wei MD, 30 mg at 02/15/23 2112        Feliciano Perdomo MD    [facility]  Call center: (D) [phone]  (S) [phone]      CC:[address],

## 2023-02-16 NOTE — PROGRESS NOTES
[facility] Note     Mike Fuentes  [id number]  1995  32 y.o.     S:  Ms. Mike Fuentes is a 32 y.o.  POD#2 s/p  for IUFD twin A and BPP 2/8 of twin B. She is complicated by HELLP syndrome     Breathing is improved, on CPAP but still with some witnessed apneic events overnight per . Her pain is well controlled, she has not been out of bed yet. She is tolerating a general diet. Emotionally she says she is doing well, but her affect seems in appropriate to me. I discussed how grief can sneak up on you and encouraged her to seek counseling, she is really not interested at this time. Has good support from her . She did finally hold baby Aundominick Villalobos yesterday. Voiding via capellan with good UOP.       She has no HA/visual changes/ruq pain/nv    O:   Visit Vitals  /72   Pulse 84   Temp 98.1 °F (36.7 °C)   Resp 18   Ht 5' (1.524 m)   Wt 103.4 kg (228 lb)   SpO2 94%   Breastfeeding Unknown   BMI 44.53 kg/m²        Gen - No acute distress  HEENT: inc facial hair   Respirations - Breathing non-labored    Abdomen - Fundus firm below the umbilicus but difficult to palpate 2/2/ habitus, and incision has bandaid on, abd soft and nontender    - no edema, capellan catheter in place   Ext - Warm, well perfused, nontender, BL SCDs in place      Recent Results (from the past 12 hour(s))   CBC W/O DIFF    Collection Time: 02/15/23  9:28 PM   Result Value Ref Range    WBC 11.9 (H) 3.6 - 11.0 K/uL    RBC 2.86 (L) 3.80 - 5.20 M/uL    HGB 8.0 (L) 11.5 - 16.0 g/dL    HCT 24.8 (L) 35.0 - 47.0 %    MCV 86.7 80.0 - 99.0 FL    MCH 28.0 26.0 - 34.0 PG    MCHC 32.3 30.0 - 36.5 g/dL    RDW 25.2 (H) 11.5 - 14.5 %    PLATELET 81 (L) 463 - 400 K/uL    MPV ABNORMAL 8.9 - 12.9 FL    NRBC 3.4 (H) 0  WBC    ABSOLUTE NRBC 0.41 (H) 0.00 - 8.36 K/uL   METABOLIC PANEL, COMPREHENSIVE    Collection Time: 02/15/23  9:30 PM   Result Value Ref Range    Sodium 134 (L) 136 - 145 mmol/L    Potassium 4.8 3.5 - 5.1 mmol/L    Chloride 100 97 - 108 mmol/L    CO2 27 21 - 32 mmol/L    Anion gap 7 5 - 15 mmol/L    Glucose 97 65 - 100 mg/dL    BUN 26 (H) 6 - 20 MG/DL    Creatinine 1.16 (H) 0.55 - 1.02 MG/DL    BUN/Creatinine ratio 22 (H) 12 - 20      eGFR >60 >60 ml/min/1.73m2    Calcium 7.3 (L) 8.5 - 10.1 MG/DL    Bilirubin, total 2.3 (H) 0.2 - 1.0 MG/DL    ALT (SGPT) 212 (H) 12 - 78 U/L    AST (SGOT) 163 (H) 15 - 37 U/L    Alk. phosphatase 153 (H) 45 - 117 U/L    Protein, total 4.8 (L) 6.4 - 8.2 g/dL    Albumin 1.7 (L) 3.5 - 5.0 g/dL    Globulin 3.1 2.0 - 4.0 g/dL    A-G Ratio 0.5 (L) 1.1 - 2.2            A/P:  Ms. Kelley Silver is a 32 y.o.  POD#2 s/p  for IUFD twin A and BPP 2/8 of twin B.  Course complicated by HELLP syndrome and labored respirations     CVS/Neuro  - AAO x 3, NAD     HEENT  - No issues     RESP  - Not requiring supplemental O2  - Pulmonology consulted -appreciated care/recs   - CPAP     CVS  - No acute issues  - BNP elevated -ECHO concentric LV hypertrophy -Cardiology to see today    - No chest pain  - HTN controlled on labetalol 400 q8, procardia xl 30 q12     Heme/ID  - HELLP, low PLT - PLT rising, s/p ffp x 1, s/p PLT x 1, s/p PRBC x 1   - H/H stable   - Start iron    - no e/o infection  - SCDs on      Endocrine  - GDMA1, sugars wnl  - Hypothyroidism, continue synthroid 50 q AM        - creatinine improving  - discontinue capellan today     GI  - general diet    - LFTs improving     Baby boy James Stain in NICU at [facility], DO

## 2023-02-16 NOTE — PROGRESS NOTES
Name: [facility]: [address]   : 1995 Admit Date: 2023   Phone: [phone]  Room:    PCP: Isabelle Armando  MRN: [id number]   Date: 2023  Code: Full Code          Chart and notes reviewed. Data reviewed. I review the patient's current medications in the medical record at each encounter. I have evaluated and examined the patient. Mrs. Matt Glez is a 32year old female with hx of PCOS admitted with HELLP syndrome,   with di/di twins at 30w6d (IVF pregnancy), one liveborn (transferred to [address] [place] [address]) and one stillborn. Patient and  report  snoring and witnessed apnea throughout her pregnancy. Patient also notes snoring as a kid and had her tonsils and adenoids removed as a result. Her nurse today reports increased lethargy/somnolence. She has a history of asthma and uses prn albuterol at baseline. S/p dose of IV Lasix 20mg early this morning. CXR personally visualized and report reviewed. Moderate cardiomegaly with minimal interstitial edema. WBC 11  Hgb 9.0  Plt 52  Creat 1.4  Mag 5.0  LFTs elevated, but trending down since admission    Interval history  Afebrile  BP stable  Sats 94% on RA  Slept with CPAP at 6cm H2O overnight; did not tolerate 10cm H2O as well when attempted earlier in the day  WBC 11.9  Hgb 8.0  Creat 1.16 - better  Mag 4.4  LFTs remain elevated, but trending down overall since admission  ABG 7.38/42. 2/108/25    ECHO: EF 60-65%; moderately increased wall thickness; normal diastolic function    ROS: Feeling better this morning. Rested well last night.  at the bedside reports continued snoring and witnessed apneas on 6cm H2O. He states her apneas and snoring were better controlled at 10cm H2O.     Past Medical History:   Diagnosis Date    ADHD (attention deficit hyperactivity disorder)     Dr. Dre Velazquez    Ankle fracture, left     4x    Asthma attack     Depression, major 2013    Dr. Chuck Huerta hypertension     Food allergy 2010    beef (severe), tomoato (severe), others.  Dr. Gloria Garduno    GERD (gastroesophageal reflux disease)     Gestational diabetes     HELLP syndrome, complicating childbirth 0/98/8658    Infertility, female     IUD (intrauterine device) in place 07/2014    Dr. French Tipton    Nephrolithiasis     multiple     Panic anxiety syndrome 08/2013    Dr. María Lee    Polycystic disease, ovaries        Past Surgical History:   Procedure Laterality Date    HX DILATION AND CURETTAGE  10/2020    HX TONSIL AND ADENOIDECTOMY      LITHOTRIPSY  3 total       Family History   Problem Relation Age of Onset    Hypertension Mother        Social History     Tobacco Use    Smoking status: Never    Smokeless tobacco: Never   Substance Use Topics    Alcohol use: No       Allergies   Allergen Reactions    Bactrim [Sulfamethoprim Ds] Rash    Beef Containing Products Itching     Itchy throat or GI issues     Compazine [Prochlorperazine Maleate] Anxiety    Griseofulvin Rash    Macrobid [Nitrofurantoin Monohyd/M-Cryst] Nausea and Vomiting    Tomato Itching     Itchy throat or GI issues        Current Facility-Administered Medications   Medication Dose Route Frequency    ibuprofen (MOTRIN) tablet 800 mg  800 mg Oral Q8H PRN    ferrous sulfate tablet 325 mg  1 Tablet Oral DAILY WITH BREAKFAST    calcium carbonate (TUMS) chewable tablet 400 mg [elemental]  400 mg Oral PRN    oxyCODONE IR (ROXICODONE) tablet 5 mg  5 mg Oral Q4H PRN    Or    oxyCODONE IR (ROXICODONE) tablet 10 mg  10 mg Oral Q4H PRN    albuterol (PROVENTIL VENTOLIN) nebulizer solution 2.5 mg  2.5 mg Nebulization Q4H PRN    levothyroxine (SYNTHROID) tablet 50 mcg  50 mcg Oral ACB    sertraline (ZOLOFT) tablet 75 mg  75 mg Oral QHS    lactated Ringers infusion  100 mL/hr IntraVENous CONTINUOUS    sodium chloride (NS) flush 5-40 mL  5-40 mL IntraVENous Q8H    acetaminophen (TYLENOL) tablet 650 mg  650 mg Oral Q4H PRN    naloxone (NARCAN) injection 0.4 mg  0.4 mg IntraVENous PRN    ondansetron (ZOFRAN) injection 4 mg  4 mg IntraVENous Q4H PRN    simethicone (MYLICON) tablet 80 mg  80 mg Oral PRN    docusate sodium (COLACE) capsule 100 mg  100 mg Oral BID    diphenhydrAMINE (BENADRYL) capsule 25 mg  25 mg Oral Q4H PRN    measles, mumps & rubella Vacc (PF) (M-M-R II) injection 0.5 mL  0.5 mL SubCUTAneous PRIOR TO DISCHARGE    zolpidem (AMBIEN) tablet 5 mg  5 mg Oral QHS PRN    mineral oil-petrolat,wht-water (LUBRIDERM) lotion   Topical Q2H PRN    Nipple Ointment (Wilian Baldwin's)BSR   Topical TID    labetaloL (NORMODYNE) tablet 400 mg  400 mg Oral Q8H    famotidine (PF) (PEPCID) 20 mg in 0.9% sodium chloride 10 mL injection  20 mg IntraVENous Q12H    NIFEdipine ER (PROCARDIA XL) tablet 30 mg  30 mg Oral Q12H         REVIEW OF SYSTEMS   12 point ROS negative except as stated in the HPI. Physical Exam:   Visit Vitals  /72   Pulse 84   Temp 98.1 °F (36.7 °C)   Resp 18   Ht 5' (1.524 m)   Wt 103.4 kg (228 lb)   SpO2 94%   Breastfeeding Unknown   BMI 44.53 kg/m²       General:  Alert and oriented cooperative, no distress, appears stated age. Head:  Normocephalic, without obvious abnormality, atraumatic. Hirsute. Eyes:  Conjunctivae/corneas clear. Nose: Nares normal. Septum midline. Mucosa normal.    Throat: Lips, mucosa, and tongue normal.  Class 4 airway. Neck: Thick, supple, symmetrical, trachea midline, no adenopathy. Lungs:   Clear to auscultation bilaterally. Chest wall:  No tenderness or deformity. Heart:  Regular rate and rhythm, S1, S2 normal, no murmur, click, rub or gallop. Abdomen:   Soft, non-tender. Bowel sounds normal. No masses,  No organomegaly. Extremities: Extremities normal, atraumatic, no cyanosis or edema. Pulses: 2+ and symmetric all extremities. Skin: Skin color, texture, turgor normal. No rashes or lesions   Lymph nodes: Cervical, supraclavicular nodes normal.   Neurologic: Much more alert and conversant.   Grossly nonfocal. Follows commands, moves all extremities. Lab Results   Component Value Date/Time    Sodium 134 (L) 02/15/2023 09:30 PM    Potassium 4.8 02/15/2023 09:30 PM    Chloride 100 02/15/2023 09:30 PM    CO2 27 02/15/2023 09:30 PM    BUN 26 (H) 02/15/2023 09:30 PM    Creatinine 1.16 (H) 02/15/2023 09:30 PM    Glucose 97 02/15/2023 09:30 PM    Calcium 7.3 (L) 02/15/2023 09:30 PM    Magnesium 4.4 (H) 02/15/2023 12:45 PM    Phosphorus 3.8 06/14/2021 01:02 AM       Lab Results   Component Value Date/Time    WBC 11.9 (H) 02/15/2023 09:28 PM    HGB 8.0 (L) 02/15/2023 09:28 PM    PLATELET 81 (L) 65/95/7729 09:28 PM    MCV 86.7 02/15/2023 09:28 PM       Lab Results   Component Value Date/Time    INR 1.1 02/14/2023 06:19 PM    aPTT 29.9 02/14/2023 06:19 PM    Alk.  phosphatase 153 (H) 02/15/2023 09:30 PM    Protein, total 4.8 (L) 02/15/2023 09:30 PM    Albumin 1.7 (L) 02/15/2023 09:30 PM    Globulin 3.1 02/15/2023 09:30 PM       Lab Results   Component Value Date/Time    Iron 25 (L) 03/20/2015 12:02 PM    TIBC 395 03/20/2015 12:02 PM    Iron % saturation 6 (LL) 03/20/2015 12:02 PM    Ferritin 11 (L) 10/06/2015 11:35 AM       Lab Results   Component Value Date/Time    TSH 1.930 03/20/2015 12:02 PM        Lab Results   Component Value Date/Time    PHI 7.38 02/15/2023 11:16 AM    PCO2I 42.2 02/15/2023 11:16 AM    PO2I 108 (H) 02/15/2023 11:16 AM    HCO3I 25.0 02/15/2023 11:16 AM    FIO2I 2 02/15/2023 11:16 AM       Lab Results   Component Value Date/Time    Troponin-I, Qt. <0.04 01/10/2018 01:13 AM        Lab Results   Component Value Date/Time    Culture result: MIXED UROGENITAL MONIQUE ISOLATED 06/14/2021 10:11 PM    Culture result: MIXED UROGENITAL MONIQUE ISOLATED 06/10/2021 11:25 PM    Culture result: MIXED UROGENITAL MONIQUE ISOLATED 04/18/2021 12:31 PM       No results found for: TOXA1, RPR, HBCM, HBSAG, HAAB, HCAB1, HAAT, G6PD, CRYAC, HIVGT, HIVR, HIV1, HIV12, HIVPC, HIVRPI    No results found for: VANCT, CPK    Lab Results Component Value Date/Time    Color YELLOW/STRAW 2021 10:11 PM    Appearance CLEAR 2021 10:11 PM    Specific gravity 1.025 2018 11:57 AM    pH (UA) 6.0 2021 10:11 PM    Protein 30 (A) 2021 10:11 PM    Glucose Negative 2021 10:11 PM    Ketone Negative 2021 10:11 PM    Bilirubin Negative 2021 10:11 PM    Blood LARGE (A) 2021 10:11 PM    Urobilinogen 1.0 2021 10:11 PM    Nitrites Negative 2021 10:11 PM    Leukocyte Esterase SMALL (A) 2021 10:11 PM    WBC 10-20 2021 10:11 PM    RBC >100 (H) 2021 10:11 PM    Bacteria Negative 2021 10:11 PM       IMPRESSION  Snoring  Witnessed apnea  Somnolence - improved  Cardiomegaly and mild edema on CXR; ECHO EF 60-65% with moderately increased wall thickness  History of asthma  HELLP syndrome,   with di/di twins at 30w6d (IVF pregnancy), one liveborn (transferred to [facility] NICU) and one stillborn  PCOS  Obesity    PLAN  Supplemental O2 to keep sats > 90%  Continue CPAP nightly and with naps. EPAP increased to 8.0cm H2O. Unfortunately auto PAP not available.   Cardiology consulted per primary team  Prn albuterol nebs  Need outpatient sleep evaluation with PSG      Bridgette Suarez

## 2023-02-17 LAB
ATRIAL RATE: 92 BPM
CALCULATED P AXIS, ECG09: 55 DEGREES
CALCULATED R AXIS, ECG10: 23 DEGREES
CALCULATED T AXIS, ECG11: 29 DEGREES
DIAGNOSIS, 93000: NORMAL
P-R INTERVAL, ECG05: 138 MS
Q-T INTERVAL, ECG07: 352 MS
QRS DURATION, ECG06: 60 MS
QTC CALCULATION (BEZET), ECG08: 435 MS
VENTRICULAR RATE, ECG03: 92 BPM

## 2023-02-17 RX ORDER — OXYCODONE AND ACETAMINOPHEN 5; 325 MG/1; MG/1
1 TABLET ORAL
Qty: 12 TABLET | Refills: 0 | Status: SHIPPED | OUTPATIENT
Start: 2023-02-17 | End: 2023-02-20

## 2023-02-17 NOTE — PROGRESS NOTES
1930-Bedside and Verbal shift change report given to BARBARA Perez RN (oncoming nurse) by ELLYN Sanchez RN (offgoing nurse). Report included the following information SBAR, Kardex, Intake/Output, MAR, and Recent Results. 2114-Discharge instructions given, including s/s of elevated pressure, patient voiced understanding. 2116-Patient left via wheelchair.

## 2023-03-15 ENCOUNTER — OFFICE VISIT (OUTPATIENT)
Dept: FAMILY MEDICINE CLINIC | Age: 28
End: 2023-03-15
Payer: COMMERCIAL

## 2023-03-15 VITALS
HEART RATE: 87 BPM | DIASTOLIC BLOOD PRESSURE: 84 MMHG | SYSTOLIC BLOOD PRESSURE: 129 MMHG | WEIGHT: 191.8 LBS | BODY MASS INDEX: 37.46 KG/M2 | OXYGEN SATURATION: 98 % | RESPIRATION RATE: 16 BRPM

## 2023-03-15 DIAGNOSIS — Z00.00 WELLNESS EXAMINATION: ICD-10-CM

## 2023-03-15 DIAGNOSIS — M54.40 ACUTE LEFT-SIDED LOW BACK PAIN WITH SCIATICA, SCIATICA LATERALITY UNSPECIFIED: ICD-10-CM

## 2023-03-15 DIAGNOSIS — Z87.59 HISTORY OF HEMOLYSIS, ELEVATED LIVER ENZYMES, AND LOW PLATELET (HELLP) SYNDROME: ICD-10-CM

## 2023-03-15 DIAGNOSIS — Z00.00 WELLNESS EXAMINATION: Primary | ICD-10-CM

## 2023-03-15 LAB
BILIRUB UR QL STRIP: NEGATIVE
GLUCOSE UR-MCNC: NEGATIVE MG/DL
KETONES P FAST UR STRIP-MCNC: NEGATIVE MG/DL
PH UR STRIP: 6 [PH] (ref 4.6–8)
PROT UR QL STRIP: NORMAL
SP GR UR STRIP: 1.02 (ref 1–1.03)
UA UROBILINOGEN AMB POC: NORMAL (ref 0.2–1)
URINALYSIS CLARITY POC: NORMAL
URINALYSIS COLOR POC: YELLOW
URINE BLOOD POC: NORMAL
URINE LEUKOCYTES POC: NEGATIVE
URINE NITRITES POC: NEGATIVE

## 2023-03-15 PROCEDURE — 99385 PREV VISIT NEW AGE 18-39: CPT | Performed by: FAMILY MEDICINE

## 2023-03-15 PROCEDURE — 81003 URINALYSIS AUTO W/O SCOPE: CPT | Performed by: FAMILY MEDICINE

## 2023-03-15 NOTE — PROGRESS NOTES
HPI:  Abigail Harper is a 32 y.o. female presenting for establish care/wellness. Reflux: prilosec, works well. Depression and anxiety and panic: increased dose of Sertraline recently to 100mg daily. IUFD of a twin pregnancy after IVF, other twin born prematurely and is in NICU at [facility], overall doing well, on cpap. Likely d/c home in May. Not aware of chronic HTN. Was never told her BP was high in the past.  Was never on BP meds prior to pregnancy. HELLP in pregnancy. Kidney stone history: back hurting a little, stones multiple times in the past, every couple years, no blood in urine, no dysuria  Never has had hypothyroid but was put on low dose synthroid during IVF treatments because TSH was on the higher side of normal.  No longer taking Synthroid. Sees Dr. Adriane Hensley tomorrow for PP follow up. Undecided about birth control. Lochia has stopped. Diet: allergic to beef and tomatoes  Exercise: walks some     Immunizations - reviewed:   Immunization History   Administered Date(s) Administered    Influenza Vaccine 11/17/2015     Flu: got in pregnacy   Tdap: thinks in the last 10 years   Pneumovax (>66yo or earlier if risks): N/A  Zostervax (>51yo): N/A    Health Maintenance reviewed -  Pap smear up to date, normal   Mammogram n/a   Colonoscopy and EGD around 2015, gastritis seen   DEXA scan (>66yo) n/a   HIV testing checked in regnancy   Hepatitis C testing checked in pregnancy   Lung cancer screening N/A    Allergies- reviewed: Allergies   Allergen Reactions    Bactrim [Sulfamethoprim Ds] Rash    Beef Containing Products Itching     Itchy throat or GI issues     Compazine [Prochlorperazine Maleate] Anxiety    Griseofulvin Rash    Macrobid [Nitrofurantoin Monohyd/M-Cryst] Nausea and Vomiting    Tomato Itching     Itchy throat or GI issues          Medications- reviewed:   Current Outpatient Medications   Medication Sig    sertraline (ZOLOFT) 50 mg tablet Take 75 mg by mouth nightly. omeprazole (PRILOSEC) 20 mg capsule Take 20 mg by mouth daily. albuterol (PROVENTIL HFA, VENTOLIN HFA, PROAIR HFA) 90 mcg/actuation inhaler Take 2 Puffs by inhalation every four (4) hours as needed for Wheezing. (Patient not taking: Reported on 3/15/2023)     No current facility-administered medications for this visit. Past Medical History- reviewed:  Past Medical History:   Diagnosis Date    ADHD (attention deficit hyperactivity disorder)     Dr. Iris Mckeon    Ankle fracture, left     4x    Asthma attack     Depression, major 2013    Dr. Rylie Jeffery hypertension     Food allergy 2010    beef (severe), tomoato (severe), others.  Dr. Naida Kwok    GERD (gastroesophageal reflux disease)     Gestational diabetes     HELLP syndrome, complicating childbirth     Infertility, female     IUD (intrauterine device) in place 2014    Dr. Mimi Leger    Nephrolithiasis     multiple     Panic anxiety syndrome 2013    Dr. Iris Mckeon    Polycystic disease, ovaries          Past Surgical History- reviewed:   Past Surgical History:   Procedure Laterality Date    HX  SECTION      HX DILATION AND CURETTAGE  10/2020    HX TONSIL AND ADENOIDECTOMY      LITHOTRIPSY  3 total         Family History - reviewed:  Family History   Problem Relation Age of Onset    Hypertension Mother          Social History - reviewed:  Social History     Socioeconomic History    Marital status:      Spouse name: Not on file    Number of children: Not on file    Years of education: Not on file    Highest education level: Not on file   Occupational History    Not on file   Tobacco Use    Smoking status: Former     Types: Cigarettes    Smokeless tobacco: Never   Substance and Sexual Activity    Alcohol use: Yes    Drug use: No    Sexual activity: Yes   Other Topics Concern    Not on file   Social History Narrative    Not on file     Social Determinants of Health     Financial Resource Strain: Not on file   Food Insecurity: Not on file   Transportation Needs: Not on file   Physical Activity: Not on file   Stress: Not on file   Social Connections: Not on file   Intimate Partner Violence: Not on file   Housing Stability: Not on file       Review of Systems   CONSTITUTIONAL: Denies: fever, chills  HEENT: hx of fluid behind her eye that she has a f/up with ophthalmology for, no vision problems     Physical Exam  Visit Vitals  /84 (BP 1 Location: Left arm, BP Patient Position: Sitting, BP Cuff Size: Large adult)   Pulse 87   Resp 16   Wt 191 lb 12.8 oz (87 kg)   SpO2 98%   Breastfeeding Unknown   BMI 37.46 kg/m²       General appearance - alert, well appearing, and in no distress  Ears - bilateral TM's and external ear canals normal  Nose - normal and patent, no erythema, discharge or polyps  Mouth - mucous membranes moist, pharynx normal without lesions  Neck - supple, no significant adenopathy, thyroid exam: thyroid is normal in size without nodules or tenderness  Chest - clear to auscultation, no wheezes, rales or rhonchi, symmetric air entry  Heart - normal rate, regular rhythm, normal S1, S2, no murmurs, rubs, clicks or gallops  Abdomen - soft, nontender, nondistended, no masses or organomegaly,  scar well healing without erythema or drainage   Neurological - alert, oriented, normal speech, no focal findings or movement disorder noted  Musculoskeletal - no joint tenderness, deformity or swelling, back with paraspinal very mild tenderness to deep palp on left, normal alignment, epidural site is clean without erythema   Extremities - no pedal edema noted  Skin - normal coloration and turgor, no rashes, no suspicious skin lesions noted  Pelvic - deferred  Breast - deferred      Assessment/Plan:    ICD-10-CM ICD-9-CM    1.  Wellness examination  U94.44 X27.0 METABOLIC PANEL, COMPREHENSIVE      LIPID PANEL      CBC W/O DIFF      TSH 3RD GENERATION      AMB POC URINALYSIS DIP STICK AUTO W/O MICRO      CULTURE, URINE      TSH 3RD GENERATION      CBC W/O DIFF      LIPID PANEL      METABOLIC PANEL, COMPREHENSIVE      2. Acute left-sided low back pain with sciatica, sciatica laterality unspecified  M54.40 724.2 AMB POC URINALYSIS DIP STICK AUTO W/O MICRO     724.3 CULTURE, URINE      3. History of hemolysis, elevated liver enzymes, and low platelet (HELLP) syndrome  Z87.59 V13.29           Wellness exam today, labs as above   Has f/up with obgyn  Check UA with culture for back pain/hx of stones. Discussed based on exam and hx more likely MSK. I have discussed the diagnosis with the patient and the intended plan as seen in the above orders. The patient has received an after-visit summary and questions were answered concerning future plans. I have discussed medication side effects and warnings with the patient as well. Informed pt to return to the office if new symptoms arise.        Asmita Mtz, DO

## 2023-03-16 LAB
ALBUMIN SERPL-MCNC: 3.9 G/DL (ref 3.5–5)
ALBUMIN/GLOB SERPL: 1.1 (ref 1.1–2.2)
ALP SERPL-CCNC: 96 U/L (ref 45–117)
ALT SERPL-CCNC: 22 U/L (ref 12–78)
ANION GAP SERPL CALC-SCNC: 1 MMOL/L (ref 5–15)
AST SERPL-CCNC: 15 U/L (ref 15–37)
BILIRUB SERPL-MCNC: 0.4 MG/DL (ref 0.2–1)
BUN SERPL-MCNC: 8 MG/DL (ref 6–20)
BUN/CREAT SERPL: 8 (ref 12–20)
CALCIUM SERPL-MCNC: 9.3 MG/DL (ref 8.5–10.1)
CHLORIDE SERPL-SCNC: 105 MMOL/L (ref 97–108)
CHOLEST SERPL-MCNC: 222 MG/DL
CO2 SERPL-SCNC: 32 MMOL/L (ref 21–32)
CREAT SERPL-MCNC: 1.02 MG/DL (ref 0.55–1.02)
ERYTHROCYTE [DISTWIDTH] IN BLOOD BY AUTOMATED COUNT: 17 % (ref 11.5–14.5)
GLOBULIN SER CALC-MCNC: 3.4 G/DL (ref 2–4)
GLUCOSE SERPL-MCNC: 78 MG/DL (ref 65–100)
HCT VFR BLD AUTO: 41.3 % (ref 35–47)
HDLC SERPL-MCNC: 67 MG/DL
HDLC SERPL: 3.3 (ref 0–5)
HGB BLD-MCNC: 12.6 G/DL (ref 11.5–16)
LDLC SERPL CALC-MCNC: 145 MG/DL (ref 0–100)
MCH RBC QN AUTO: 28.9 PG (ref 26–34)
MCHC RBC AUTO-ENTMCNC: 30.5 G/DL (ref 30–36.5)
MCV RBC AUTO: 94.7 FL (ref 80–99)
NRBC # BLD: 0 K/UL (ref 0–0.01)
NRBC BLD-RTO: 0 PER 100 WBC
PLATELET # BLD AUTO: 359 K/UL (ref 150–400)
PMV BLD AUTO: 10.1 FL (ref 8.9–12.9)
POTASSIUM SERPL-SCNC: 4.3 MMOL/L (ref 3.5–5.1)
PROT SERPL-MCNC: 7.3 G/DL (ref 6.4–8.2)
RBC # BLD AUTO: 4.36 M/UL (ref 3.8–5.2)
SODIUM SERPL-SCNC: 138 MMOL/L (ref 136–145)
TRIGL SERPL-MCNC: 50 MG/DL (ref ?–150)
TSH SERPL DL<=0.05 MIU/L-ACNC: 2.67 UIU/ML (ref 0.36–3.74)
VLDLC SERPL CALC-MCNC: 10 MG/DL
WBC # BLD AUTO: 4.4 K/UL (ref 3.6–11)

## 2023-03-17 LAB
BACTERIA SPEC CULT: NORMAL
CC UR VC: NORMAL
SERVICE CMNT-IMP: NORMAL

## 2023-05-24 ENCOUNTER — PATIENT MESSAGE (OUTPATIENT)
Age: 28
End: 2023-05-24

## 2023-05-24 NOTE — TELEPHONE ENCOUNTER
From: Zina Varela  To: Dr. Delaney Rendon: 5/24/2023 8:36 AM EDT  Subject: Omeprazole refill    Hi,  I realized since changing providers that my prescriptions were still listed under the old doctor I saw through [facility], would you be able to send me in a new prescription for my Omprezole? When I had tried to refill it through [facility] I think it went to the old doctors office and was never approved.     Thank you,  [facility]

## 2023-05-25 RX ORDER — OMEPRAZOLE 20 MG/1
20 CAPSULE, DELAYED RELEASE ORAL DAILY
Qty: 90 CAPSULE | Refills: 3 | Status: SHIPPED | OUTPATIENT
Start: 2023-05-25

## 2023-06-20 NOTE — TELEPHONE ENCOUNTER
Patient came back and request to speak with nurse. She stated she did not want to have withdrawals from medication(Zoloft) due to being on medication for so long. I informed her that I did not think Dr. Chelsy Alejandro sent a refill for that medication on Friday that it may have been sent over by her previous doctor. Patient stated she will contact pharmacy to see who sent over the medication so she can reach out to that doctor. I also offered patient an appointment for today but she declined due to her having to go to NICU.

## 2023-06-20 NOTE — TELEPHONE ENCOUNTER
In the process of moving patient believes her medication got thrown away. She is on day 2 without medication, Sertraline(Zoloft). Send to [facility] on [address] Patient needs a refill as soon as possible.

## 2023-06-23 NOTE — TELEPHONE ENCOUNTER
Per verbal order from Dr. Ortega  to send in refill sertraline 50 mg 30 tabs 0 refills to [facility] pharmacy.

## 2023-07-24 ENCOUNTER — TELEPHONE (OUTPATIENT)
Age: 28
End: 2023-07-24

## 2023-07-24 NOTE — TELEPHONE ENCOUNTER
----- Message from [facility] sent at 7/18/2023  2:29 PM EDT -----  Subject: Message to Provider    QUESTIONS  Information for Provider? Patient called in to let her provider know that   she would like to switch providers. Patient would like to see Claribel Johnson in the office. Please contact patient to further assist.   ---------------------------------------------------------------------------  --------------  Orquidea Marker INFO  [phone]; OK to leave message on voicemail  ---------------------------------------------------------------------------  --------------  SCRIPT ANSWERS  Relationship to Patient?  Self

## 2024-02-21 ENCOUNTER — E-VISIT (OUTPATIENT)
Age: 29
End: 2024-02-21

## 2024-02-21 DIAGNOSIS — F41.9 ANXIETY: Primary | ICD-10-CM

## 2024-02-21 RX ORDER — SERTRALINE HYDROCHLORIDE 100 MG/1
100 TABLET, FILM COATED ORAL DAILY
Qty: 90 TABLET | Refills: 0 | Status: SHIPPED | OUTPATIENT
Start: 2024-02-21

## 2024-02-21 ASSESSMENT — PATIENT HEALTH QUESTIONNAIRE - PHQ9
5. POOR APPETITE OR OVEREATING: 0
3. TROUBLE FALLING OR STAYING ASLEEP: NOT AT ALL
9. THOUGHTS THAT YOU WOULD BE BETTER OFF DEAD, OR OF HURTING YOURSELF: NOT AT ALL
5. POOR APPETITE OR OVEREATING: NOT AT ALL
SUM OF ALL RESPONSES TO PHQ9 QUESTIONS 1 & 2: 0
1. LITTLE INTEREST OR PLEASURE IN DOING THINGS: NOT AT ALL
2. FEELING DOWN, DEPRESSED OR HOPELESS: 0
8. MOVING OR SPEAKING SO SLOWLY THAT OTHER PEOPLE COULD HAVE NOTICED. OR THE OPPOSITE - BEING SO FIDGETY OR RESTLESS THAT YOU HAVE BEEN MOVING AROUND A LOT MORE THAN USUAL: NOT AT ALL
SUM OF ALL RESPONSES TO PHQ QUESTIONS 1-9: 1
SUM OF ALL RESPONSES TO PHQ QUESTIONS 1-9: 1
1. LITTLE INTEREST OR PLEASURE IN DOING THINGS: 0
SUM OF ALL RESPONSES TO PHQ QUESTIONS 1-9: 1
3. TROUBLE FALLING OR STAYING ASLEEP: 0
4. FEELING TIRED OR HAVING LITTLE ENERGY: SEVERAL DAYS
10. IF YOU CHECKED OFF ANY PROBLEMS, HOW DIFFICULT HAVE THESE PROBLEMS MADE IT FOR YOU TO DO YOUR WORK, TAKE CARE OF THINGS AT HOME, OR GET ALONG WITH OTHER PEOPLE: NOT DIFFICULT AT ALL
6. FEELING BAD ABOUT YOURSELF - OR THAT YOU ARE A FAILURE OR HAVE LET YOURSELF OR YOUR FAMILY DOWN: NOT AT ALL
8. MOVING OR SPEAKING SO SLOWLY THAT OTHER PEOPLE COULD HAVE NOTICED. OR THE OPPOSITE, BEING SO FIGETY OR RESTLESS THAT YOU HAVE BEEN MOVING AROUND A LOT MORE THAN USUAL: 0
9. THOUGHTS THAT YOU WOULD BE BETTER OFF DEAD, OR OF HURTING YOURSELF: 0
10. IF YOU CHECKED OFF ANY PROBLEMS, HOW DIFFICULT HAVE THESE PROBLEMS MADE IT FOR YOU TO DO YOUR WORK, TAKE CARE OF THINGS AT HOME, OR GET ALONG WITH OTHER PEOPLE: 0
6. FEELING BAD ABOUT YOURSELF - OR THAT YOU ARE A FAILURE OR HAVE LET YOURSELF OR YOUR FAMILY DOWN: 0
SUM OF ALL RESPONSES TO PHQ QUESTIONS 1-9: 1
4. FEELING TIRED OR HAVING LITTLE ENERGY: 1
7. TROUBLE CONCENTRATING ON THINGS, SUCH AS READING THE NEWSPAPER OR WATCHING TELEVISION: NOT AT ALL
7. TROUBLE CONCENTRATING ON THINGS, SUCH AS READING THE NEWSPAPER OR WATCHING TELEVISION: 0
2. FEELING DOWN, DEPRESSED OR HOPELESS: NOT AT ALL
SUM OF ALL RESPONSES TO PHQ QUESTIONS 1-9: 1

## 2024-02-21 ASSESSMENT — ANXIETY QUESTIONNAIRES
6. BECOMING EASILY ANNOYED OR IRRITABLE: 0
3. WORRYING TOO MUCH ABOUT DIFFERENT THINGS: NOT AT ALL
1. FEELING NERVOUS, ANXIOUS, OR ON EDGE: SEVERAL DAYS
GAD7 TOTAL SCORE: 1
2. NOT BEING ABLE TO STOP OR CONTROL WORRYING: 0
5. BEING SO RESTLESS THAT IT IS HARD TO SIT STILL: NOT AT ALL
3. WORRYING TOO MUCH ABOUT DIFFERENT THINGS: 0
6. BECOMING EASILY ANNOYED OR IRRITABLE: NOT AT ALL
GAD7 TOTAL SCORE: 1
5. BEING SO RESTLESS THAT IT IS HARD TO SIT STILL: 0
2. NOT BEING ABLE TO STOP OR CONTROL WORRYING: NOT AT ALL
1. FEELING NERVOUS, ANXIOUS, OR ON EDGE: 1
IF YOU CHECKED OFF ANY PROBLEMS ON THIS QUESTIONNAIRE, HOW DIFFICULT HAVE THESE PROBLEMS MADE IT FOR YOU TO DO YOUR WORK, TAKE CARE OF THINGS AT HOME, OR GET ALONG WITH OTHER PEOPLE: NOT DIFFICULT AT ALL
4. TROUBLE RELAXING: 0
4. TROUBLE RELAXING: NOT AT ALL
IF YOU CHECKED OFF ANY PROBLEMS ON THIS QUESTIONNAIRE, HOW DIFFICULT HAVE THESE PROBLEMS MADE IT FOR YOU TO DO YOUR WORK, TAKE CARE OF THINGS AT HOME, OR GET ALONG WITH OTHER PEOPLE: NOT DIFFICULT AT ALL
7. FEELING AFRAID AS IF SOMETHING AWFUL MIGHT HAPPEN: 0
7. FEELING AFRAID AS IF SOMETHING AWFUL MIGHT HAPPEN: NOT AT ALL

## 2024-02-21 ASSESSMENT — PATIENT HEALTH QUESTIONNAIRE - GENERAL
DO YOU HAVE ANY NEW RASHES OR UNEXPLAINED ITCHING OR SWELLING: N
HAVE YOU BEEN EXPERIENCING NEW OR WORSENING VISUAL CHANGES: N
HAVE YOU BEEN EXPERIENCING NEW OR WORSENING HEADACHES: N
SINCE YOUR LAST VISIT, HAVE YOU EXPERIENCED EPISODES OF FAINTING OR NEAR FAINTING: N
DO YOU HAVE A FASTER HEART RATE THAN USUAL, DOES YOUR HEART RATE FEEL IRREGULAR OR DO YOU FEEL LIKE YOUR HEART IS POUNDING AT REST: N
HAVE YOU BEEN EXPERIENCING LIGHT HEADEDNESS, WOOZINESS, OR DIZZINESS, ESPECIALLY UPON STANDING FROM SITTING OR LYING POSITIONS: N
HAVE YOU BEEN EXPERIENCING VIVID DREAMS OR NIGHTMARES THAT INTERFERE WITH YOUR SLEEP: N
HAVE YOU BEEN EXPERIENCING INVOLUNTARY WEIGHT GAIN OR LOSS: N
HAVE YOU BEEN EXPERIENCING UNEXPLAINED HIGH FEVERS OR EXCESSIVE SWEATING: N
HAVE YOU BEEN EXPERIENCING ABDOMINAL DISCOMFORT, NAUSEA OR CHANGES IN YOUR BOWEL PATTERN: N
HAVE YOU BEEN EXPERIENCING VERY LOW OR VERY HIGH MOODS: N
HAVE YOU BEEN EXPERIENCING HALLUCINATIONS OR CONFUSION: N
HAVE YOU BEEN EXPERIENCING AN UNUSUALLY DRY MOUTH: N
HAVE YOU BEEN EXPERIENCING NEW OR WORSENING PROBLEMS WITH YOUR SEXUAL FUNCTION: N
HAVE YOU BEEN EXPERIENCING RACING THOUGHTS OR IMPULSIVE BEHAVIOR: N
HAVE YOU BEEN EXPERIENCING NEW OR WORSENING MUSCLE TWITCHING, SHAKINESS, OR OTHER UNUSUAL CHANGES IN YOUR MUSCLE MOVEMENT: N
HAVE YOU BEEN EXPERIENCING NEW OR WORSENING DIFFICULTY WITH URINATION OR CHANGE IN URINARY PATTERN: N

## 2024-05-29 ENCOUNTER — OFFICE VISIT (OUTPATIENT)
Age: 29
End: 2024-05-29
Payer: COMMERCIAL

## 2024-05-29 VITALS
HEART RATE: 100 BPM | RESPIRATION RATE: 18 BRPM | HEIGHT: 60 IN | DIASTOLIC BLOOD PRESSURE: 85 MMHG | WEIGHT: 216.6 LBS | TEMPERATURE: 98.3 F | OXYGEN SATURATION: 98 % | SYSTOLIC BLOOD PRESSURE: 123 MMHG | BODY MASS INDEX: 42.53 KG/M2

## 2024-05-29 DIAGNOSIS — Z01.84 IMMUNITY STATUS TESTING: ICD-10-CM

## 2024-05-29 DIAGNOSIS — Z00.00 WELL ADULT EXAM: Primary | ICD-10-CM

## 2024-05-29 DIAGNOSIS — E28.2 PCOS (POLYCYSTIC OVARIAN SYNDROME): ICD-10-CM

## 2024-05-29 DIAGNOSIS — K21.9 GASTROESOPHAGEAL REFLUX DISEASE WITHOUT ESOPHAGITIS: ICD-10-CM

## 2024-05-29 DIAGNOSIS — F41.9 ANXIETY: ICD-10-CM

## 2024-05-29 DIAGNOSIS — E03.9 ACQUIRED HYPOTHYROIDISM: ICD-10-CM

## 2024-05-29 DIAGNOSIS — E66.01 CLASS 3 SEVERE OBESITY DUE TO EXCESS CALORIES WITH SERIOUS COMORBIDITY AND BODY MASS INDEX (BMI) OF 40.0 TO 44.9 IN ADULT (HCC): ICD-10-CM

## 2024-05-29 PROBLEM — J45.20 MILD INTERMITTENT ASTHMA WITHOUT COMPLICATION: Status: ACTIVE | Noted: 2019-05-23

## 2024-05-29 PROBLEM — Z91.018 FOOD ALLERGY: Status: ACTIVE | Noted: 2018-03-28

## 2024-05-29 PROBLEM — F90.9 ADHD (ATTENTION DEFICIT HYPERACTIVITY DISORDER): Status: ACTIVE | Noted: 2018-03-28

## 2024-05-29 PROBLEM — N20.0 NEPHROLITHIASIS: Status: ACTIVE | Noted: 2018-03-28

## 2024-05-29 PROBLEM — J30.2 ACUTE SEASONAL ALLERGIC RHINITIS: Status: ACTIVE | Noted: 2022-01-11

## 2024-05-29 PROCEDURE — 99395 PREV VISIT EST AGE 18-39: CPT | Performed by: FAMILY MEDICINE

## 2024-05-29 PROCEDURE — 3074F SYST BP LT 130 MM HG: CPT | Performed by: FAMILY MEDICINE

## 2024-05-29 PROCEDURE — 3079F DIAST BP 80-89 MM HG: CPT | Performed by: FAMILY MEDICINE

## 2024-05-29 RX ORDER — OMEPRAZOLE 20 MG/1
20 CAPSULE, DELAYED RELEASE ORAL DAILY
Qty: 90 CAPSULE | Refills: 3 | Status: SHIPPED | OUTPATIENT
Start: 2024-05-29

## 2024-05-29 RX ORDER — SERTRALINE HYDROCHLORIDE 100 MG/1
100 TABLET, FILM COATED ORAL DAILY
Qty: 90 TABLET | Refills: 3 | Status: SHIPPED | OUTPATIENT
Start: 2024-05-29

## 2024-05-29 SDOH — ECONOMIC STABILITY: HOUSING INSECURITY
IN THE LAST 12 MONTHS, WAS THERE A TIME WHEN YOU DID NOT HAVE A STEADY PLACE TO SLEEP OR SLEPT IN A SHELTER (INCLUDING NOW)?: NO

## 2024-05-29 SDOH — ECONOMIC STABILITY: FOOD INSECURITY: WITHIN THE PAST 12 MONTHS, YOU WORRIED THAT YOUR FOOD WOULD RUN OUT BEFORE YOU GOT MONEY TO BUY MORE.: NEVER TRUE

## 2024-05-29 SDOH — ECONOMIC STABILITY: FOOD INSECURITY: WITHIN THE PAST 12 MONTHS, THE FOOD YOU BOUGHT JUST DIDN'T LAST AND YOU DIDN'T HAVE MONEY TO GET MORE.: NEVER TRUE

## 2024-05-29 SDOH — ECONOMIC STABILITY: INCOME INSECURITY: HOW HARD IS IT FOR YOU TO PAY FOR THE VERY BASICS LIKE FOOD, HOUSING, MEDICAL CARE, AND HEATING?: NOT VERY HARD

## 2024-05-29 ASSESSMENT — PATIENT HEALTH QUESTIONNAIRE - PHQ9
1. LITTLE INTEREST OR PLEASURE IN DOING THINGS: NOT AT ALL
SUM OF ALL RESPONSES TO PHQ QUESTIONS 1-9: 0
SUM OF ALL RESPONSES TO PHQ QUESTIONS 1-9: 0
2. FEELING DOWN, DEPRESSED OR HOPELESS: NOT AT ALL
SUM OF ALL RESPONSES TO PHQ QUESTIONS 1-9: 0
SUM OF ALL RESPONSES TO PHQ QUESTIONS 1-9: 0
SUM OF ALL RESPONSES TO PHQ9 QUESTIONS 1 & 2: 0

## 2024-05-29 NOTE — PROGRESS NOTES
HPI:  Roberta Tavarez is a 28 y.o. female presenting for well woman exam.     Exercise: not currently doing a lot of exercises. Patient goes to a lot of places for her baby's appointments.    Diet: 1 meal a day, usually foods that are fast and convenient. 24 hour recall include eggs, ramesh and breakfast potatoes for dinner, usually croissant for lunch.     GYN:  , son is 15 months old but  birth in second trimester. IUFD of a twin pregnancy after IVF. Patient has PCOS. LMP 2 weeks ago. Irregular periods with heavy flow. Last gyn visit in 2024 for heavy bleeding.    Sexual: No hx of STDs.  Currently sexually active with .  Not on birth control and not interested in pursuing methods of birth control.     Psych: mood is \"good\". Episodes of PTSD with flashbacks about once every couple of weeks. No prolonged periods of depression and anxiety. No panic attacks. No SI, HI.    Health Maintenance - reviewed:  Pap (age 21-65): per chart review patient had pap smear in  and was normal.    HIV screening: done in pregnancy, denied today      Allergies- reviewed:   Allergies   Allergen Reactions    Beef-Derived Products Other (See Comments)      GI intolerance    Tomato Itching     Itchy throat or GI issues     Bactrim [Sulfamethoxazole-Trimethoprim] Rash    Cat Hair Extract Rash    Griseofulvin Rash     Childhood Reaction     Nitrofurantoin Nausea And Vomiting and Rash     Macrobid.    Prochlorperazine Maleate Anxiety     Compazine.    Sulfa Antibiotics Rash         Medications- reviewed:   Current Outpatient Medications   Medication Sig    omeprazole (PRILOSEC) 20 MG delayed release capsule Take 1 capsule by mouth daily    sertraline (ZOLOFT) 100 MG tablet Take 1 tablet by mouth daily    albuterol sulfate HFA (PROVENTIL;VENTOLIN;PROAIR) 108 (90 Base) MCG/ACT inhaler Inhale 2 puffs into the lungs every 4 hours as needed     No current facility-administered medications for this visit.         Past

## 2024-05-29 NOTE — PATIENT INSTRUCTIONS
Website to find a therapist:   [email or web address]  [email or web address]        Please call to schedule an appointment with the nutritionist.     Adore Tsang, MPH, RDN  Nutritionist    [facility] - [address]  [address]  [place], [place] [address] [phone]     [facility] - [address], [address]  [place], [place] [address] [phone]     [facility]- [address], [address] (Cardiac Rehab Office)  [place], [place]   [address] [phone]

## 2024-05-29 NOTE — PROGRESS NOTES
Room 6    Identified pt with two pt identifiers(name and ). Reviewed record in preparation for visit and have obtained necessary documentation.      Chief Complaint   Patient presents with    Annual Exam        Health Maintenance Due   Topic    Hepatitis B vaccine (1 of 3 - 3-dose series)    COVID-19 Vaccine (1)    Varicella vaccine (1 of 2 - 2-dose childhood series)    Hepatitis C screen     DTaP/Tdap/Td vaccine (1 - Tdap)    Cervical cancer screen        Vitals:    24 0946   BP: 123/85   Site: Right Upper Arm   Position: Sitting   Cuff Size: Large Adult   Pulse: 100   Resp: 18   Temp: 98.3 °F (36.8 °C)   TempSrc: Oral   SpO2: 98%   Weight: 98.2 kg (216 lb 9.6 oz)   Height: 1.524 m (5')         \"Have you been to the ER, urgent care clinic since your last visit?  Hospitalized since your last visit?\"    NO    “Have you seen or consulted any other health care providers outside of [facility] since your last visit?”    NO     “Have you had a pap smear?”    Dr. Jory Daly [facility]     No cervical cancer screening on file       Click Here for Release of Records Request     This patient is accompanied in the office by her self.  I have received verbal consent from Roberta Tavarez to discuss any/all medical information while they are present in the room.

## 2024-05-30 LAB
ALBUMIN SERPL-MCNC: 4 G/DL (ref 3.5–5)
ALBUMIN/GLOB SERPL: 1.1 (ref 1.1–2.2)
ALP SERPL-CCNC: 83 U/L (ref 45–117)
ALT SERPL-CCNC: 20 U/L (ref 12–78)
ANION GAP SERPL CALC-SCNC: 5 MMOL/L (ref 5–15)
AST SERPL-CCNC: 16 U/L (ref 15–37)
BILIRUB SERPL-MCNC: 0.4 MG/DL (ref 0.2–1)
BUN SERPL-MCNC: 7 MG/DL (ref 6–20)
BUN/CREAT SERPL: 9 (ref 12–20)
CALCIUM SERPL-MCNC: 9.5 MG/DL (ref 8.5–10.1)
CHLORIDE SERPL-SCNC: 103 MMOL/L (ref 97–108)
CHOLEST SERPL-MCNC: 254 MG/DL
CO2 SERPL-SCNC: 29 MMOL/L (ref 21–32)
CREAT SERPL-MCNC: 0.82 MG/DL (ref 0.55–1.02)
ERYTHROCYTE [DISTWIDTH] IN BLOOD BY AUTOMATED COUNT: 14.6 % (ref 11.5–14.5)
EST. AVERAGE GLUCOSE BLD GHB EST-MCNC: 123 MG/DL
GLOBULIN SER CALC-MCNC: 3.8 G/DL (ref 2–4)
GLUCOSE SERPL-MCNC: 121 MG/DL (ref 65–100)
HBA1C MFR BLD: 5.9 % (ref 4–5.6)
HCT VFR BLD AUTO: 37.3 % (ref 35–47)
HDLC SERPL-MCNC: 54 MG/DL
HDLC SERPL: 4.7 (ref 0–5)
HGB BLD-MCNC: 11.4 G/DL (ref 11.5–16)
LDLC SERPL CALC-MCNC: 184.2 MG/DL (ref 0–100)
MCH RBC QN AUTO: 24.7 PG (ref 26–34)
MCHC RBC AUTO-ENTMCNC: 30.6 G/DL (ref 30–36.5)
MCV RBC AUTO: 80.9 FL (ref 80–99)
NRBC # BLD: 0 K/UL (ref 0–0.01)
NRBC BLD-RTO: 0 PER 100 WBC
PLATELET # BLD AUTO: 429 K/UL (ref 150–400)
PMV BLD AUTO: 9.7 FL (ref 8.9–12.9)
POTASSIUM SERPL-SCNC: 4.2 MMOL/L (ref 3.5–5.1)
PROT SERPL-MCNC: 7.8 G/DL (ref 6.4–8.2)
RBC # BLD AUTO: 4.61 M/UL (ref 3.8–5.2)
SODIUM SERPL-SCNC: 137 MMOL/L (ref 136–145)
TRIGL SERPL-MCNC: 79 MG/DL
TSH SERPL DL<=0.05 MIU/L-ACNC: 3.64 UIU/ML (ref 0.36–3.74)
VLDLC SERPL CALC-MCNC: 15.8 MG/DL
VZV IGG SER IA-ACNC: 262 INDEX
WBC # BLD AUTO: 4 K/UL (ref 3.6–11)

## 2024-10-08 DIAGNOSIS — K21.9 GASTROESOPHAGEAL REFLUX DISEASE WITHOUT ESOPHAGITIS: ICD-10-CM
